# Patient Record
Sex: FEMALE | Race: WHITE | NOT HISPANIC OR LATINO | Employment: OTHER | URBAN - METROPOLITAN AREA
[De-identification: names, ages, dates, MRNs, and addresses within clinical notes are randomized per-mention and may not be internally consistent; named-entity substitution may affect disease eponyms.]

---

## 2017-01-23 RX ORDER — BUTALBITAL, ACETAMINOPHEN AND CAFFEINE 50; 325; 40 MG/1; MG/1; MG/1
TABLET ORAL
Qty: 100 TABLET | Refills: 3 | Status: SHIPPED | OUTPATIENT
Start: 2017-01-23 | End: 2017-06-22 | Stop reason: SDUPTHER

## 2017-02-01 ENCOUNTER — TELEPHONE (OUTPATIENT)
Dept: RHEUMATOLOGY | Facility: CLINIC | Age: 57
End: 2017-02-01

## 2017-02-02 NOTE — TELEPHONE ENCOUNTER
Patient states she has burning while urinating. Dr. Rodriguez will check urinalysis when patient comes February 6,2017

## 2017-02-05 RX ORDER — CIPROFLOXACIN 500 MG/1
500 TABLET ORAL 2 TIMES DAILY
Qty: 10 TABLET | Refills: 0 | Status: SHIPPED | OUTPATIENT
Start: 2017-02-05 | End: 2017-07-05 | Stop reason: ALTCHOICE

## 2017-02-06 ENCOUNTER — PATIENT MESSAGE (OUTPATIENT)
Dept: RHEUMATOLOGY | Facility: CLINIC | Age: 57
End: 2017-02-06

## 2017-02-06 ENCOUNTER — OFFICE VISIT (OUTPATIENT)
Dept: RHEUMATOLOGY | Facility: CLINIC | Age: 57
End: 2017-02-06
Payer: MEDICARE

## 2017-02-06 VITALS
WEIGHT: 96.31 LBS | HEART RATE: 83 BPM | DIASTOLIC BLOOD PRESSURE: 79 MMHG | SYSTOLIC BLOOD PRESSURE: 110 MMHG | HEIGHT: 59 IN | BODY MASS INDEX: 19.42 KG/M2

## 2017-02-06 DIAGNOSIS — F32.A DEPRESSION, UNSPECIFIED DEPRESSION TYPE: ICD-10-CM

## 2017-02-06 DIAGNOSIS — L40.50 PSORIATIC ARTHRITIS: ICD-10-CM

## 2017-02-06 DIAGNOSIS — R63.4 WEIGHT LOSS: ICD-10-CM

## 2017-02-06 DIAGNOSIS — L40.8 PSORIASIS WITH PUSTULES: Primary | ICD-10-CM

## 2017-02-06 DIAGNOSIS — R79.9 ABNORMAL FINDING OF BLOOD CHEMISTRY: ICD-10-CM

## 2017-02-06 DIAGNOSIS — E06.9 THYROIDITIS: ICD-10-CM

## 2017-02-06 PROCEDURE — 99215 OFFICE O/P EST HI 40 MIN: CPT | Mod: 25,S$PBB,, | Performed by: INTERNAL MEDICINE

## 2017-02-06 PROCEDURE — 99999 PR PBB SHADOW E&M-EST. PATIENT-LVL III: CPT | Mod: PBBFAC,,, | Performed by: INTERNAL MEDICINE

## 2017-02-06 PROCEDURE — 96372 THER/PROPH/DIAG INJ SC/IM: CPT | Mod: PBBFAC,PO

## 2017-02-06 PROCEDURE — 99213 OFFICE O/P EST LOW 20 MIN: CPT | Mod: PBBFAC,PO | Performed by: INTERNAL MEDICINE

## 2017-02-06 RX ORDER — MEGESTROL ACETATE 40 MG/1
40 TABLET ORAL 2 TIMES DAILY
Qty: 60 TABLET | Refills: 11 | Status: SHIPPED | OUTPATIENT
Start: 2017-02-06 | End: 2018-01-29 | Stop reason: SDUPTHER

## 2017-02-06 RX ORDER — PREDNISONE 5 MG/1
5 TABLET ORAL DAILY
Qty: 30 TABLET | Refills: 5 | Status: SHIPPED | OUTPATIENT
Start: 2017-02-06 | End: 2017-03-09 | Stop reason: SDUPTHER

## 2017-02-06 RX ORDER — QUETIAPINE FUMARATE 100 MG/1
100 TABLET, FILM COATED ORAL DAILY
COMMUNITY
End: 2017-07-05

## 2017-02-06 RX ORDER — ESCITALOPRAM OXALATE 20 MG/1
20 TABLET ORAL DAILY
Qty: 30 TABLET | Refills: 6 | Status: SHIPPED | OUTPATIENT
Start: 2017-02-06 | End: 2017-07-05

## 2017-02-06 RX ORDER — KETOROLAC TROMETHAMINE 30 MG/ML
60 INJECTION, SOLUTION INTRAMUSCULAR; INTRAVENOUS
Status: COMPLETED | OUTPATIENT
Start: 2017-02-06 | End: 2017-02-06

## 2017-02-06 RX ORDER — METHYLPREDNISOLONE ACETATE 80 MG/ML
160 INJECTION, SUSPENSION INTRA-ARTICULAR; INTRALESIONAL; INTRAMUSCULAR; SOFT TISSUE
Status: COMPLETED | OUTPATIENT
Start: 2017-02-06 | End: 2017-02-06

## 2017-02-06 RX ORDER — BUTALBITAL, ACETAMINOPHEN AND CAFFEINE 50; 325; 40 MG/1; MG/1; MG/1
TABLET ORAL
Qty: 100 TABLET | Refills: 3 | Status: SHIPPED | OUTPATIENT
Start: 2017-02-06 | End: 2017-10-02 | Stop reason: SDUPTHER

## 2017-02-06 RX ORDER — THYROID 30 MG/1
TABLET ORAL
Qty: 30 TABLET | Refills: 4 | Status: SHIPPED | OUTPATIENT
Start: 2017-02-06 | End: 2017-07-05

## 2017-02-06 RX ORDER — CYANOCOBALAMIN 1000 UG/ML
1000 INJECTION, SOLUTION INTRAMUSCULAR; SUBCUTANEOUS
Status: COMPLETED | OUTPATIENT
Start: 2017-02-06 | End: 2017-02-06

## 2017-02-06 RX ORDER — ESCITALOPRAM OXALATE 10 MG/1
10 TABLET ORAL DAILY
COMMUNITY
End: 2017-02-06 | Stop reason: SDUPTHER

## 2017-02-06 RX ORDER — BACLOFEN 20 MG/1
20 TABLET ORAL 3 TIMES DAILY
Qty: 90 TABLET | Refills: 11 | Status: SHIPPED | OUTPATIENT
Start: 2017-02-06 | End: 2018-01-29

## 2017-02-06 RX ORDER — CLONAZEPAM 1 MG/1
1 TABLET ORAL 2 TIMES DAILY PRN
COMMUNITY
End: 2017-07-05

## 2017-02-06 RX ADMIN — KETOROLAC TROMETHAMINE 60 MG: 60 INJECTION, SOLUTION INTRAMUSCULAR at 09:02

## 2017-02-06 RX ADMIN — CYANOCOBALAMIN 1000 MCG: 1000 INJECTION, SOLUTION INTRAMUSCULAR at 09:02

## 2017-02-06 RX ADMIN — METHYLPREDNISOLONE ACETATE 160 MG: 80 INJECTION, SUSPENSION INTRA-ARTICULAR; INTRALESIONAL; INTRAMUSCULAR; SOFT TISSUE at 09:02

## 2017-02-06 ASSESSMENT — ROUTINE ASSESSMENT OF PATIENT INDEX DATA (RAPID3)
TOTAL RAPID3 SCORE: 6.39
PATIENT GLOBAL ASSESSMENT SCORE: 9
PSYCHOLOGICAL DISTRESS SCORE: 9.9
PAIN SCORE: 7.5
MDHAQ FUNCTION SCORE: .8

## 2017-02-06 NOTE — PROGRESS NOTES
Administered 1 cc ( 1000 mcg/ml ) of b 12 to the right upper outer gluteal. Informed of s/s to report verbalized understanding. No adverse reactions noted.    Lot # [id number]  Expiration jul 18    Administered 2 cc ( 80 mg/ml ) of depomedrol to the right upper outer gluteal. Informed of s/s to report verbalized understanding. No adverse reactions noted.    Lot # [id number]  Expiration 10/2017    Administered 2 cc ( 30 mg/ml ) of toradol to the left upper outer gluteal. Informed of s/s to report verbalized understanding. No adverse reactions noted.    Lot # [id number]  Expiration 1 mar 2018

## 2017-02-06 NOTE — MR AVS SNAPSHOT
[facility]  [address] [facility] [address] [place] [address]  Phone: [phone]  Fax: [phone]                  Barbara Mims   2017 8:30 AM   Office Visit    Description:  Female : 1960   Provider:  Micheal Rodriguez MD   Department:  [facility]           Reason for Visit     Disease Management           Diagnoses this Visit        Comments    Psoriasis with pustules    -  Primary     Psoriatic arthritis         Thyroiditis         Weight loss         Depression, unspecified depression type         Abnormal finding of blood chemistry                To Do List           Future Appointments        Provider Department Dept Phone    2017 10:45 AM LAB, [place] [facility] [phone]    2017 10:00 AM Micheal Rodriguez MD [facility] [phone]      Goals (5 Years of Data)     None      Follow-Up and Disposition     Return in about 4 months (around 2017).       These Medications        Disp Refills Start End    predniSONE (DELTASONE) 5 MG tablet 30 tablet 5 2017 3/8/2017    Take 1 tablet (5 mg total) by mouth once daily. - Oral    Pharmacy: [facility] - [address] Ph #: [phone]       baclofen (LIORESAL) 20 MG tablet 90 tablet 11 2017    Take 1 tablet (20 mg total) by mouth 3 (three) times daily. - Oral    Pharmacy: [place] [id number] [facility] Ph #: [phone]       escitalopram oxalate (LEXAPRO) 20 MG tablet 30 tablet 6 2017     Take 1 tablet (20 mg total) by mouth once daily. - Oral    Pharmacy: [place] [id number] [facility] Ph #: [phone]       megestrol (MEGACE) 40 MG Tab 60 tablet 11 2017    Take 1 tablet (40 mg total) by mouth 2 (two) times daily. appetite - Oral    [facility] [place]  [facility] [id number] [facility] Ph #: [phone]       thyroid (ARMOUR THYROID) Tab 30 tablet 4 2/6/2017     Take 1 tablet (30 mg total) by mouth once daily. In am with water hold all other foods and meds for 30 min    [facility] [id number] [facility] Ph #: [phone]       butalbital-acetaminophen-caffeine -40 mg (FIORICET, ESGIC) -40 mg per tablet 100 tablet 3 2/6/2017     Take 1 tablet by mouth every 6 (six) hours as needed for Pain or Headaches.    Pharmacy: [facility] [id number] [facility] Ph #: [phone]       Notes to Pharmacy: This prescription was filled today. Any refills authorized will be placed on file.      [facility]     [facility] Care [facility] - 24/7 Assistance  Registered nurses in the [facility] provide clinical advisement, health education, appointment booking, and other advisory services.  Call for this free service at [phone].             Medications           Message regarding Medications     Verify the changes and/or additions to your medication regime listed below are the same as discussed with your clinician today.  If any of these changes or additions are incorrect, please notify your healthcare provider.        START taking these NEW medications        Refills    predniSONE (DELTASONE) 5 MG tablet 5    Sig: Take 1 tablet (5 mg total) by mouth once daily.    Class: Normal    Route: Oral    baclofen (LIORESAL) 20 MG tablet 11    Sig: Take 1 tablet (20 mg total) by mouth 3 (three) times daily.    Class: Normal    Route: Oral    escitalopram oxalate (LEXAPRO) 20 MG tablet 6    Sig: Take 1 tablet (20 mg total) by mouth once daily.    Class: Normal    Route: Oral    megestrol (MEGACE) 40 MG Tab 11    Sig: Take 1 tablet (40 mg total) by mouth 2 (two) times daily. appetite    Class: Normal    Route: Oral      These medications were administered today         Dose Freq    methylPREDNISolone acetate injection 160 mg 160 mg Clinic/HOD 1 time    Sig: Inject 2 mLs (160 mg total) into the muscle one time.    Class: Normal    Route: Intramuscular    ketorolac injection 60 mg 60 mg Clinic/HOD 1 time    Sig: Inject 2 mLs (60 mg total) into the muscle one time.    Class: Normal    Route: Intramuscular    cyanocobalamin injection 1,000 mcg 1,000 mcg Clinic/HOD 1 time    Sig: Inject 1 mL (1,000 mcg total) into the muscle one time.    Class: Normal    Route: Intramuscular      CHANGE how you are taking these medications     Start Taking Instead of    thyroid (ARMOUR THYROID) Tab ARMOUR THYROID 30 mg Tab    Dosage:  Take 1 tablet (30 mg total) by mouth once daily. In am with water hold all other foods and meds for 30 min Dosage:  Take 1 tablet (30 mg total) by mouth once daily. In am with water hold all other foods and meds for 30 min    Reason for Change:  Patient no longer taking       STOP taking these medications     paroxetine (PAXIL) 20 MG tablet Take 1 tablet (20 mg total) by mouth every morning.    amitriptyline (ELAVIL) 50 MG tablet Take 3 tablets (150 mg total) by mouth nightly.    oxycodone-acetaminophen (PERCOCET)  mg per tablet Take 1 tablet by mouth every 4 to 6 hours as needed.    gabapentin (NEURONTIN) 100 MG capsule     duloxetine (CYMBALTA) 60 MG capsule Take 1 capsule (60 mg total) by mouth once daily. At night    amitriptyline (ELAVIL) 50 MG tablet Take 3 tablets (150 mg total) by mouth nightly.    zolpidem (AMBIEN) 10 mg Tab TAKE 1 TABLET BY MOUTH NIGHTLY AS NEEDED    tizanidine 4 mg Cap Take 6 mg by mouth.    CRESTOR 20 mg tablet Take 40 mg by mouth once daily.     carvedilol (COREG) 12.5 MG tablet Take 12.5 mg by mouth 2 (two) times daily with meals.            Verify that the below list of medications is an accurate representation of the medications you are currently taking.  If none reported, the list may be blank. If incorrect, please contact your  "healthcare provider. Carry this list with you in case of emergency.           Current Medications     clonazePAM (KLONOPIN) 1 MG tablet Take 1 mg by mouth 2 (two) times daily as needed for Anxiety.    escitalopram oxalate (LEXAPRO) 20 MG tablet Take 1 tablet (20 mg total) by mouth once daily.    quetiapine (SEROQUEL) 100 MG Tab Take 100 mg by mouth once daily.    baclofen (LIORESAL) 20 MG tablet Take 1 tablet (20 mg total) by mouth 3 (three) times daily.    butalbital-acetaminophen-caffeine -40 mg (FIORICET, ESGIC) -40 mg per tablet TAKE 1 TABLET BY MOUTH EVERY 6 HOURS AS NEEDED FOR PAIN and HEADACHE    butalbital-acetaminophen-caffeine -40 mg (FIORICET, ESGIC) -40 mg per tablet Take 1 tablet by mouth every 6 (six) hours as needed for Pain or Headaches.    ciprofloxacin HCl (CIPRO) 500 MG tablet Take 1 tablet (500 mg total) by mouth 2 (two) times daily.    clobetasol 0.05% (TEMOVATE) 0.05 % Oint Apply topically 2 (two) times daily.    diazepam (VALIUM) 5 MG tablet     ferrous sulfate 325 (65 FE) MG EC tablet Take 325 mg by mouth once daily.    hydrocodone-acetaminophen 10-325mg (NORCO)  mg Tab Take 1 tablet by mouth 4 (four) times daily.     hydrOXYzine pamoate (VISTARIL) 25 MG Cap Take 1 capsule (25 mg total) by mouth every 8 (eight) hours as needed.    megestrol (MEGACE) 40 MG Tab Take 1 tablet (40 mg total) by mouth 2 (two) times daily. appetite    omeprazole (PRILOSEC) 40 MG capsule TAKE 1 CAPSULE BY MOUTH once daily    predniSONE (DELTASONE) 5 MG tablet Take 1 tablet (5 mg total) by mouth once daily.    thyroid (ARMOUR THYROID) Tab Take 1 tablet (30 mg total) by mouth once daily. In am with water hold all other foods and meds for 30 min           Clinical Reference Information           Your Vitals Were     BP Pulse Height Weight BMI    110/79 83 4' 11" (1.499 m) 43.7 kg (96 lb 5.5 oz) 19.46 kg/m2      Blood Pressure          Most Recent Value    BP  110/79      Allergies as of " 2/6/2017     Ancef [Cefazolin]    Demerol [Meperidine]      Immunizations Administered on Date of Encounter - 2/6/2017     None      Orders Placed During Today's Visit     Future Labs/Procedures Expected by Expires    C-reactive protein  2/6/2017 4/7/2018    CBC auto differential  2/6/2017 4/7/2018    Comprehensive metabolic panel  2/6/2017 4/7/2018    Hemoglobin A1c  2/6/2017 4/7/2018    Sedimentation rate, manual  2/6/2017 4/7/2018    T3, free  2/6/2017 4/7/2018    T4, free  2/6/2017 4/7/2018    TSH  2/6/2017 4/7/2018    Vitamin D  2/6/2017 4/7/2018      Administrations This Visit     cyanocobalamin injection 1,000 mcg     Admin Date Action Dose Route Administered By             02/06/2017 Given 1000 mcg Intramuscular Henna Sahu LPN                    ketorolac injection 60 mg     Admin Date Action Dose Route Administered By             02/06/2017 Given 60 mg Intramuscular Henna Sahu LPN                    methylPREDNISolone acetate injection 160 mg     Admin Date Action Dose Route Administered By             02/06/2017 Given 160 mg Intramuscular Henna Sahu LPN                      Instructions      Ask about the ambien   tell him i upped the lexapro added megace and add prednisone  Changed zanaflex to baclofen       Smoking Cessation     If you would like to quit smoking:   You may be eligible for free services if you are a [place] resident and started smoking cigarettes before September 1, 1988.  Call the Smoking Cessation [employer] ([facility]) toll free at [phone] or [phone].   Call 1-800-QUIT-NOW if you do not meet the above criteria.            Language Assistance Services     ATTENTION: Language assistance services are available, free of charge. Please call [phone].      ATENCIÓN: Si habla español, tiene a kaur disposición servicios gratuitos de asistencia lingüística. Llame al [phone].     CHÚ Ý: N?u b?n nói Ti?ng Vi?t, có các d?ch v? h? tr? ngôn ng? mi?n phí  dành cho b?n. G?i s? [phone].         [facility] complies with applicable Federal civil rights laws and does not discriminate on the basis of race, color, national origin, age, disability, or sex.

## 2017-02-06 NOTE — PATIENT INSTRUCTIONS
Ask about the ambien   tell him i upped the lexapro added megace and add prednisone  Changed zanaflex to baclofen

## 2017-02-06 NOTE — PROGRESS NOTES
Subjective:       Patient ID: Barbara Mims is a 56 y.o. female.    Chief Complaint: Disease Management   HPI Comments: :Psoriatic arthritis   Here for follow up the pt is presently nothing for treatment and is doing poorly with  increase rash or increase joint pain. She lost her son nov 2016The pt has not had any symptoms of fever, chills, shortness of breath, and change in activity. Noted is am gelling lasting longer than 30 min , there is still some gelling with inactivity. There is  some swelling in the knees but not warm to the touch and enthesis pain has been noted over the last 11 months. Overall disease state is worsening without improvement .   Very depressed and lost 20 lbs            She complains of joint swelling. Associated symptoms include myalgias.               She complains of joint swelling. Associated symptoms include myalgias.               She complains of joint swelling. Associated symptoms include myalgias.       Fatigue   Associated symptoms include joint swelling, myalgias, neck pain, a rash and weakness. Pertinent negatives include no abdominal pain, anorexia, arthralgias, change in bowel habit, chest pain, chills, congestion, coughing, diaphoresis, nausea, numbness, sore throat, swollen glands, urinary symptoms, vertigo, visual change or vomiting.     Review of Systems   Constitutional: Positive for activity change. Negative for appetite change, chills, diaphoresis and unexpected weight change.   HENT: Negative for congestion, dental problem, ear discharge, ear pain, facial swelling, mouth sores, nosebleeds, postnasal drip, rhinorrhea, sinus pressure, sneezing, sore throat, tinnitus and voice change.    Eyes: Negative for photophobia, pain, discharge, redness and itching.   Respiratory: Negative for apnea, cough, chest tightness, shortness of breath and wheezing.    Cardiovascular: Positive for leg swelling. Negative for chest pain and palpitations.   Gastrointestinal: Negative for  "abdominal distention, abdominal pain, anorexia, change in bowel habit, constipation, diarrhea, nausea and vomiting.   Endocrine: Negative for cold intolerance, heat intolerance, polydipsia and polyuria.   Genitourinary: Negative for decreased urine volume, difficulty urinating, flank pain, frequency, hematuria and urgency.   Musculoskeletal: Positive for back pain, gait problem, joint swelling, myalgias, neck pain and neck stiffness. Negative for arthralgias.   Skin: Positive for rash. Negative for pallor and wound.        Nailbed psoriatic   Allergic/Immunologic: Negative for immunocompromised state.   Neurological: Positive for weakness. Negative for dizziness, vertigo, tremors and numbness.   Hematological: Negative for adenopathy. Does not bruise/bleed easily.   Psychiatric/Behavioral: Negative for sleep disturbance. The patient is not nervous/anxious.          Objective:     Visit Vitals    /79    Pulse 83    Ht 4' 11" (1.499 m)    Wt 43.7 kg (96 lb 5.5 oz)    BMI 19.46 kg/m2        Physical Exam   Nursing note and vitals reviewed.  Constitutional: She is oriented to person, place, and time and well-developed, well-nourished, and in no distress.   HENT:   Head: Normocephalic and atraumatic.   Mouth/Throat: Oropharynx is clear and moist.   Eyes: EOM are normal. Pupils are equal, round, and reactive to light.   Neck: Neck supple. No thyromegaly present.   Cardiovascular: Normal rate, regular rhythm and normal heart sounds.  Exam reveals no gallop and no friction rub.    No murmur heard.  Pulmonary/Chest: Breath sounds normal. She has no wheezes. She has no rales. She exhibits no tenderness.   Abdominal: There is no tenderness. There is no rebound and no guarding.       Right Side Rheumatological Exam     Examination finds the elbow normal.    The patient is tender to palpation of the shoulder, wrist, knee, 1st PIP, 1st MCP, 2nd PIP, 2nd MCP, 3rd PIP, 3rd MCP, 4th PIP, 4th MCP, 5th PIP and 5th " MCP    She has swelling of the 1st PIP, 1st MCP, 2nd PIP, 2nd MCP, 3rd PIP, 3rd MCP, 4th PIP, 4th MCP, 5th PIP and 5th MCP    Shoulder Exam   Tenderness Location: no tenderness    Range of Motion   Active Abduction: abnormal   Adduction: abnormal  Sensation: normal    Knee Exam   Patellofemoral Crepitus: positive  Effusion: negative  Sensation: normal    Hip Exam   Tenderness Location: posterior  Sensation: normal    Elbow/Wrist Exam   Tenderness Location: no tenderness  Sensation: normal    Left Side Rheumatological Exam     Examination finds the elbow normal.    The patient is tender to palpation of the shoulder, wrist, knee, 1st PIP, 1st MCP, 2nd PIP, 2nd MCP, 3rd PIP, 3rd MCP, 4th PIP, 4th MCP, 5th PIP and 5th MCP.    She has swelling of the 1st PIP, 1st MCP, 2nd PIP, 2nd MCP, 3rd PIP, 3rd MCP, 4th PIP, 4th MCP, 5th PIP and 5th MCP    Shoulder Exam   Tenderness Location: no tenderness    Range of Motion   Active Abduction: abnormal   Sensation: normal    Knee Exam     Patellofemoral Crepitus: positive  Effusion: negative  Sensation: normal    Hip Exam   Tenderness Location: posterior  Sensation: normal    Elbow/Wrist Exam   Sensation: normal      Back/Neck Exam   Neck Range of Motion   Flexion: Severely limited  Extension: Severely limited    Comments:   Incision clear    Lymphadenopathy:     She has no cervical adenopathy.   Neurological: She is alert and oriented to person, place, and time. Gait normal.   Skin: No rash noted. No erythema. No pallor.     Psychiatric: Mood and affect normal.   Musculoskeletal: She exhibits tenderness and deformity. She exhibits no edema.           Results for orders placed or performed in visit on 02/29/16   TSH   Result Value Ref Range    TSH 4.529 (H) 0.400 - 4.000 uIU/mL   T4, free   Result Value Ref Range    Free T4 0.69 (L) 0.71 - 1.51 ng/dL   T3, free   Result Value Ref Range    T3, Free 2.4 2.3 - 4.2 pg/mL   Comprehensive metabolic panel   Result Value Ref Range    Sodium  141 136 - 145 mmol/L    Potassium 4.3 3.5 - 5.1 mmol/L    Chloride 104 95 - 110 mmol/L    CO2 27 23 - 29 mmol/L    Glucose 98 70 - 110 mg/dL    BUN, Bld 13 6 - 20 mg/dL    Creatinine 0.8 0.5 - 1.4 mg/dL    Calcium 9.8 8.7 - 10.5 mg/dL    Total Protein 7.6 6.0 - 8.4 g/dL    Albumin 4.2 3.5 - 5.2 g/dL    Total Bilirubin 0.2 0.1 - 1.0 mg/dL    Alkaline Phosphatase 108 55 - 135 U/L    AST 21 10 - 40 U/L    ALT 22 10 - 44 U/L    Anion Gap 10 8 - 16 mmol/L    eGFR if African American >60.0 >60 mL/min/1.73 m^2    eGFR if non African American >60.0 >60 mL/min/1.73 m^2   CBC auto differential   Result Value Ref Range    WBC 13.70 (H) 3.90 - 12.70 K/uL    RBC 4.29 4.00 - 5.40 M/uL    Hemoglobin 14.0 12.0 - 16.0 g/dL    Hematocrit 41.7 37.0 - 48.5 %    MCV 97 82 - 98 fL    MCH 32.6 (H) 27.0 - 31.0 pg    MCHC 33.6 32.0 - 36.0 %    RDW 12.8 11.5 - 14.5 %    Platelets 264 150 - 350 K/uL    MPV 10.5 9.2 - 12.9 fL    Gran # 8.4 (H) 1.8 - 7.7 K/uL    Lymph # 3.8 1.0 - 4.8 K/uL    Mono # 0.9 0.3 - 1.0 K/uL    Eos # 0.5 0.0 - 0.5 K/uL    Baso # 0.11 0.00 - 0.20 K/uL    Gran% 61.2 38.0 - 73.0 %    Lymph% 27.7 18.0 - 48.0 %    Mono% 6.7 4.0 - 15.0 %    Eosinophil% 3.4 0.0 - 8.0 %    Basophil% 0.8 0.0 - 1.9 %    Differential Method Automated    Anti-thyroglobulin antibody   Result Value Ref Range    Thyroglobulin Ab Screen <4.0 0.0 - 3.9 IU/mL   Thyroid peroxidase antibody   Result Value Ref Range    Thyroperoxidase Antibodies 37.0 (H) <6.0 IU/mL   Sjogrens syndrome-A extractable nuclear antibody   Result Value Ref Range    Anti-SSA Antibody 0.89 0.00 - 19.99 EU    Anti-SSA Interpretation Negative Negative   Sjogrens syndrome-B extractable nuclear antibody   Result Value Ref Range    Anti-SSB Antibody 0.26 0.00 - 19.99 EU    Anti-SSB Interpretation Negative Negative       Assessment:       Encounter Diagnoses   Name Primary?    Psoriasis with pustules Yes    Psoriatic arthritis     Thyroiditis     Weight loss     Depression,  unspecified depression type     Abnormal finding of blood chemistry           Plan:     Psoriasis with pustules  -     predniSONE (DELTASONE) 5 MG tablet; Take 1 tablet (5 mg total) by mouth once daily.  Dispense: 30 tablet; Refill: 5  -     baclofen (LIORESAL) 20 MG tablet; Take 1 tablet (20 mg total) by mouth 3 (three) times daily.  Dispense: 90 tablet; Refill: 11  -     escitalopram oxalate (LEXAPRO) 20 MG tablet; Take 1 tablet (20 mg total) by mouth once daily.  Dispense: 30 tablet; Refill: 6  -     megestrol (MEGACE) 40 MG Tab; Take 1 tablet (40 mg total) by mouth 2 (two) times daily. appetite  Dispense: 60 tablet; Refill: 11  -     methylPREDNISolone acetate injection 160 mg; Inject 2 mLs (160 mg total) into the muscle one time.  -     ketorolac injection 60 mg; Inject 2 mLs (60 mg total) into the muscle one time.  -     cyanocobalamin injection 1,000 mcg; Inject 1 mL (1,000 mcg total) into the muscle one time.  -     CBC auto differential; Future; Expected date: 2/6/17  -     Comprehensive metabolic panel; Future; Expected date: 2/6/17  -     C-reactive protein; Future; Expected date: 2/6/17  -     Sedimentation rate, manual; Future; Expected date: 2/6/17  -     TSH; Future; Expected date: 2/6/17  -     T4, free; Future; Expected date: 2/6/17  -     T3, free; Future; Expected date: 2/6/17  -     Vitamin D; Future; Expected date: 2/6/17  -     thyroid (ARMOUR THYROID) Tab; Take 1 tablet (30 mg total) by mouth once daily. In am with water hold all other foods and meds for 30 min  Dispense: 30 tablet; Refill: 4  -     Hemoglobin A1c; Future; Expected date: 2/6/17    Psoriatic arthritis  -     predniSONE (DELTASONE) 5 MG tablet; Take 1 tablet (5 mg total) by mouth once daily.  Dispense: 30 tablet; Refill: 5  -     baclofen (LIORESAL) 20 MG tablet; Take 1 tablet (20 mg total) by mouth 3 (three) times daily.  Dispense: 90 tablet; Refill: 11  -     escitalopram oxalate (LEXAPRO) 20 MG tablet; Take 1 tablet (20 mg  total) by mouth once daily.  Dispense: 30 tablet; Refill: 6  -     megestrol (MEGACE) 40 MG Tab; Take 1 tablet (40 mg total) by mouth 2 (two) times daily. appetite  Dispense: 60 tablet; Refill: 11  -     methylPREDNISolone acetate injection 160 mg; Inject 2 mLs (160 mg total) into the muscle one time.  -     ketorolac injection 60 mg; Inject 2 mLs (60 mg total) into the muscle one time.  -     cyanocobalamin injection 1,000 mcg; Inject 1 mL (1,000 mcg total) into the muscle one time.  -     CBC auto differential; Future; Expected date: 2/6/17  -     Comprehensive metabolic panel; Future; Expected date: 2/6/17  -     C-reactive protein; Future; Expected date: 2/6/17  -     Sedimentation rate, manual; Future; Expected date: 2/6/17  -     TSH; Future; Expected date: 2/6/17  -     T4, free; Future; Expected date: 2/6/17  -     T3, free; Future; Expected date: 2/6/17  -     Vitamin D; Future; Expected date: 2/6/17  -     thyroid (ARMOUR THYROID) Tab; Take 1 tablet (30 mg total) by mouth once daily. In am with water hold all other foods and meds for 30 min  Dispense: 30 tablet; Refill: 4  -     Hemoglobin A1c; Future; Expected date: 2/6/17    Thyroiditis  -     predniSONE (DELTASONE) 5 MG tablet; Take 1 tablet (5 mg total) by mouth once daily.  Dispense: 30 tablet; Refill: 5  -     baclofen (LIORESAL) 20 MG tablet; Take 1 tablet (20 mg total) by mouth 3 (three) times daily.  Dispense: 90 tablet; Refill: 11  -     escitalopram oxalate (LEXAPRO) 20 MG tablet; Take 1 tablet (20 mg total) by mouth once daily.  Dispense: 30 tablet; Refill: 6  -     megestrol (MEGACE) 40 MG Tab; Take 1 tablet (40 mg total) by mouth 2 (two) times daily. appetite  Dispense: 60 tablet; Refill: 11  -     methylPREDNISolone acetate injection 160 mg; Inject 2 mLs (160 mg total) into the muscle one time.  -     ketorolac injection 60 mg; Inject 2 mLs (60 mg total) into the muscle one time.  -     cyanocobalamin injection 1,000 mcg; Inject 1 mL (1,000  mcg total) into the muscle one time.  -     CBC auto differential; Future; Expected date: 2/6/17  -     Comprehensive metabolic panel; Future; Expected date: 2/6/17  -     C-reactive protein; Future; Expected date: 2/6/17  -     Sedimentation rate, manual; Future; Expected date: 2/6/17  -     TSH; Future; Expected date: 2/6/17  -     T4, free; Future; Expected date: 2/6/17  -     T3, free; Future; Expected date: 2/6/17  -     Vitamin D; Future; Expected date: 2/6/17  -     thyroid (ARMOUR THYROID) Tab; Take 1 tablet (30 mg total) by mouth once daily. In am with water hold all other foods and meds for 30 min  Dispense: 30 tablet; Refill: 4  -     Hemoglobin A1c; Future; Expected date: 2/6/17    Weight loss  -     predniSONE (DELTASONE) 5 MG tablet; Take 1 tablet (5 mg total) by mouth once daily.  Dispense: 30 tablet; Refill: 5  -     baclofen (LIORESAL) 20 MG tablet; Take 1 tablet (20 mg total) by mouth 3 (three) times daily.  Dispense: 90 tablet; Refill: 11  -     escitalopram oxalate (LEXAPRO) 20 MG tablet; Take 1 tablet (20 mg total) by mouth once daily.  Dispense: 30 tablet; Refill: 6  -     megestrol (MEGACE) 40 MG Tab; Take 1 tablet (40 mg total) by mouth 2 (two) times daily. appetite  Dispense: 60 tablet; Refill: 11  -     methylPREDNISolone acetate injection 160 mg; Inject 2 mLs (160 mg total) into the muscle one time.  -     ketorolac injection 60 mg; Inject 2 mLs (60 mg total) into the muscle one time.  -     cyanocobalamin injection 1,000 mcg; Inject 1 mL (1,000 mcg total) into the muscle one time.  -     CBC auto differential; Future; Expected date: 2/6/17  -     Comprehensive metabolic panel; Future; Expected date: 2/6/17  -     C-reactive protein; Future; Expected date: 2/6/17  -     Sedimentation rate, manual; Future; Expected date: 2/6/17  -     TSH; Future; Expected date: 2/6/17  -     T4, free; Future; Expected date: 2/6/17  -     T3, free; Future; Expected date: 2/6/17  -     Vitamin D; Future;  Expected date: 2/6/17  -     thyroid (ARMOUR THYROID) Tab; Take 1 tablet (30 mg total) by mouth once daily. In am with water hold all other foods and meds for 30 min  Dispense: 30 tablet; Refill: 4  -     Hemoglobin A1c; Future; Expected date: 2/6/17    Depression, unspecified depression type  -     predniSONE (DELTASONE) 5 MG tablet; Take 1 tablet (5 mg total) by mouth once daily.  Dispense: 30 tablet; Refill: 5  -     baclofen (LIORESAL) 20 MG tablet; Take 1 tablet (20 mg total) by mouth 3 (three) times daily.  Dispense: 90 tablet; Refill: 11  -     escitalopram oxalate (LEXAPRO) 20 MG tablet; Take 1 tablet (20 mg total) by mouth once daily.  Dispense: 30 tablet; Refill: 6  -     megestrol (MEGACE) 40 MG Tab; Take 1 tablet (40 mg total) by mouth 2 (two) times daily. appetite  Dispense: 60 tablet; Refill: 11  -     methylPREDNISolone acetate injection 160 mg; Inject 2 mLs (160 mg total) into the muscle one time.  -     ketorolac injection 60 mg; Inject 2 mLs (60 mg total) into the muscle one time.  -     cyanocobalamin injection 1,000 mcg; Inject 1 mL (1,000 mcg total) into the muscle one time.  -     CBC auto differential; Future; Expected date: 2/6/17  -     Comprehensive metabolic panel; Future; Expected date: 2/6/17  -     C-reactive protein; Future; Expected date: 2/6/17  -     Sedimentation rate, manual; Future; Expected date: 2/6/17  -     TSH; Future; Expected date: 2/6/17  -     T4, free; Future; Expected date: 2/6/17  -     T3, free; Future; Expected date: 2/6/17  -     Vitamin D; Future; Expected date: 2/6/17  -     thyroid (ARMOUR THYROID) Tab; Take 1 tablet (30 mg total) by mouth once daily. In am with water hold all other foods and meds for 30 min  Dispense: 30 tablet; Refill: 4  -     Hemoglobin A1c; Future; Expected date: 2/6/17    Abnormal finding of blood chemistry   -     Hemoglobin A1c; Future; Expected date: 2/6/17    Other orders  -     butalbital-acetaminophen-caffeine -40 mg (FIORICET,  ESGIC) -40 mg per tablet; Take 1 tablet by mouth every 6 (six) hours as needed for Pain or Headaches.  Dispense: 100 tablet; Refill: 3  Ask about the ambien   tell him i upped the lexapro added megace and add prednisone  Changed zanaflex to baclofen

## 2017-02-16 ENCOUNTER — LAB VISIT (OUTPATIENT)
Dept: LAB | Facility: HOSPITAL | Age: 57
End: 2017-02-16
Attending: INTERNAL MEDICINE
Payer: MEDICARE

## 2017-02-16 DIAGNOSIS — L40.8 PSORIASIS WITH PUSTULES: ICD-10-CM

## 2017-02-16 DIAGNOSIS — E06.9 THYROIDITIS: ICD-10-CM

## 2017-02-16 DIAGNOSIS — F32.A DEPRESSION, UNSPECIFIED DEPRESSION TYPE: ICD-10-CM

## 2017-02-16 DIAGNOSIS — L40.50 PSORIATIC ARTHRITIS: ICD-10-CM

## 2017-02-16 DIAGNOSIS — R63.4 WEIGHT LOSS: ICD-10-CM

## 2017-02-16 DIAGNOSIS — R79.9 ABNORMAL FINDING OF BLOOD CHEMISTRY: ICD-10-CM

## 2017-02-16 LAB
25(OH)D3+25(OH)D2 SERPL-MCNC: 11 NG/ML
ALBUMIN SERPL BCP-MCNC: 4.3 G/DL
ALP SERPL-CCNC: 87 U/L
ALT SERPL W/O P-5'-P-CCNC: 10 U/L
ANION GAP SERPL CALC-SCNC: 7 MMOL/L
AST SERPL-CCNC: 13 U/L
BASOPHILS # BLD AUTO: 0.07 K/UL
BASOPHILS NFR BLD: 0.9 %
BILIRUB SERPL-MCNC: 0.3 MG/DL
BUN SERPL-MCNC: 23 MG/DL
CALCIUM SERPL-MCNC: 9.7 MG/DL
CHLORIDE SERPL-SCNC: 110 MMOL/L
CO2 SERPL-SCNC: 21 MMOL/L
CREAT SERPL-MCNC: 0.8 MG/DL
CRP SERPL-MCNC: 0.5 MG/L
DIFFERENTIAL METHOD: ABNORMAL
EOSINOPHIL # BLD AUTO: 0.1 K/UL
EOSINOPHIL NFR BLD: 0.9 %
ERYTHROCYTE [DISTWIDTH] IN BLOOD BY AUTOMATED COUNT: 12.7 %
ERYTHROCYTE [SEDIMENTATION RATE] IN BLOOD BY WESTERGREN METHOD: 9 MM/HR
EST. GFR  (AFRICAN AMERICAN): >60 ML/MIN/1.73 M^2
EST. GFR  (NON AFRICAN AMERICAN): >60 ML/MIN/1.73 M^2
GLUCOSE SERPL-MCNC: 97 MG/DL
HCT VFR BLD AUTO: 41.8 %
HGB BLD-MCNC: 14.4 G/DL
LYMPHOCYTES # BLD AUTO: 2.1 K/UL
LYMPHOCYTES NFR BLD: 27.1 %
MCH RBC QN AUTO: 32.9 PG
MCHC RBC AUTO-ENTMCNC: 34.4 %
MCV RBC AUTO: 95 FL
MONOCYTES # BLD AUTO: 0.3 K/UL
MONOCYTES NFR BLD: 3.8 %
NEUTROPHILS # BLD AUTO: 5.3 K/UL
NEUTROPHILS NFR BLD: 67 %
PLATELET # BLD AUTO: 302 K/UL
PMV BLD AUTO: 9.9 FL
POTASSIUM SERPL-SCNC: 5 MMOL/L
PROT SERPL-MCNC: 7.6 G/DL
RBC # BLD AUTO: 4.38 M/UL
SODIUM SERPL-SCNC: 138 MMOL/L
T3FREE SERPL-MCNC: 2.4 PG/ML
T4 FREE SERPL-MCNC: 0.81 NG/DL
TSH SERPL DL<=0.005 MIU/L-ACNC: 1.67 UIU/ML
WBC # BLD AUTO: 7.83 K/UL

## 2017-02-16 PROCEDURE — 83036 HEMOGLOBIN GLYCOSYLATED A1C: CPT

## 2017-02-16 PROCEDURE — 84481 FREE ASSAY (FT-3): CPT

## 2017-02-16 PROCEDURE — 80053 COMPREHEN METABOLIC PANEL: CPT

## 2017-02-16 PROCEDURE — 85025 COMPLETE CBC W/AUTO DIFF WBC: CPT

## 2017-02-16 PROCEDURE — 84443 ASSAY THYROID STIM HORMONE: CPT

## 2017-02-16 PROCEDURE — 84439 ASSAY OF FREE THYROXINE: CPT

## 2017-02-16 PROCEDURE — 86140 C-REACTIVE PROTEIN: CPT

## 2017-02-16 PROCEDURE — 85651 RBC SED RATE NONAUTOMATED: CPT | Mod: PO

## 2017-02-16 PROCEDURE — 82306 VITAMIN D 25 HYDROXY: CPT

## 2017-02-16 PROCEDURE — 36415 COLL VENOUS BLD VENIPUNCTURE: CPT | Mod: PO

## 2017-02-17 LAB
ESTIMATED AVG GLUCOSE: 117 MG/DL
HBA1C MFR BLD HPLC: 5.7 %

## 2017-02-17 RX ORDER — ERGOCALCIFEROL 1.25 MG/1
50000 CAPSULE ORAL
Qty: 4 CAPSULE | Refills: 6 | Status: SHIPPED | OUTPATIENT
Start: 2017-02-17 | End: 2017-07-05

## 2017-02-20 NOTE — TELEPHONE ENCOUNTER
----- Message from Kaya Hess sent at 2/20/2017  9:55 AM CST -----  Patient is calling for lab test results.Please call patient back at [phone] to advise.  Thanks!

## 2017-02-21 RX ORDER — ERGOCALCIFEROL 1.25 MG/1
50000 CAPSULE ORAL
Qty: 4 CAPSULE | Refills: 6 | Status: SHIPPED | OUTPATIENT
Start: 2017-02-21 | End: 2017-07-05 | Stop reason: SDUPTHER

## 2017-03-09 ENCOUNTER — TELEPHONE (OUTPATIENT)
Dept: RHEUMATOLOGY | Facility: CLINIC | Age: 57
End: 2017-03-09

## 2017-03-09 DIAGNOSIS — L40.50 PSORIATIC ARTHRITIS: ICD-10-CM

## 2017-03-09 DIAGNOSIS — L40.8 PSORIASIS WITH PUSTULES: ICD-10-CM

## 2017-03-09 DIAGNOSIS — E06.9 THYROIDITIS: ICD-10-CM

## 2017-03-09 DIAGNOSIS — F32.A DEPRESSION, UNSPECIFIED DEPRESSION TYPE: ICD-10-CM

## 2017-03-09 DIAGNOSIS — R63.4 WEIGHT LOSS: ICD-10-CM

## 2017-03-09 RX ORDER — PREDNISONE 5 MG/1
5 TABLET ORAL DAILY
Qty: 30 TABLET | Refills: 5 | Status: SHIPPED | OUTPATIENT
Start: 2017-03-09 | End: 2017-03-15 | Stop reason: SDUPTHER

## 2017-03-09 NOTE — TELEPHONE ENCOUNTER
----- Message from Tamela Pacheco sent at 3/7/2017  2:49 PM CST -----  Contact: self  Patient called stating that the Prednisone 5 mg is not helping   She was previous high dosage and would like to take that again   Please call    Thanks

## 2017-03-09 NOTE — TELEPHONE ENCOUNTER
----- Message from Snow Mcfadden sent at 3/9/2017 10:59 AM CST -----  Contact: pt  Returning call, Mirna  Call placed to pod, no answer  Call back on # [phone]  thanks

## 2017-03-15 DIAGNOSIS — R63.4 WEIGHT LOSS: ICD-10-CM

## 2017-03-15 DIAGNOSIS — E06.9 THYROIDITIS: ICD-10-CM

## 2017-03-15 DIAGNOSIS — L40.50 PSORIATIC ARTHRITIS: ICD-10-CM

## 2017-03-15 DIAGNOSIS — F32.A DEPRESSION, UNSPECIFIED DEPRESSION TYPE: ICD-10-CM

## 2017-03-15 DIAGNOSIS — L40.8 PSORIASIS WITH PUSTULES: ICD-10-CM

## 2017-03-16 RX ORDER — PREDNISONE 5 MG/1
5 TABLET ORAL DAILY
Qty: 30 TABLET | Refills: 5 | Status: SHIPPED | OUTPATIENT
Start: 2017-03-16 | End: 2017-04-15

## 2017-04-19 DIAGNOSIS — E03.9 HYPOTHYROIDISM, UNSPECIFIED TYPE: ICD-10-CM

## 2017-04-19 DIAGNOSIS — G47.00 INSOMNIA, UNSPECIFIED TYPE: ICD-10-CM

## 2017-04-19 DIAGNOSIS — L40.50 PSORIATIC ARTHRITIS: ICD-10-CM

## 2017-04-19 DIAGNOSIS — L40.8 PSORIASIS WITH PUSTULES: ICD-10-CM

## 2017-04-20 RX ORDER — ZOLPIDEM TARTRATE 10 MG/1
TABLET ORAL
Qty: 30 TABLET | Refills: 3 | Status: SHIPPED | OUTPATIENT
Start: 2017-04-20 | End: 2017-08-23 | Stop reason: SDUPTHER

## 2017-05-02 ENCOUNTER — TELEPHONE (OUTPATIENT)
Dept: RHEUMATOLOGY | Facility: CLINIC | Age: 57
End: 2017-05-02

## 2017-06-22 NOTE — TELEPHONE ENCOUNTER
----- Message from Emily Rodriguez sent at 6/22/2017  1:13 PM CDT -----  Contact: Patient  Patient called advising that her pharmacy, [facility] in [place], sent a prescription request for refill of butalbital-acetaminophen-caffeine -40 mg (FIORICET, ESGIC) -40 mg per tablet.  However, they have not heard a response.  Can you please call patient back at [phone] to advise of the status?  Thank you!

## 2017-06-23 RX ORDER — BUTALBITAL, ACETAMINOPHEN AND CAFFEINE 50; 325; 40 MG/1; MG/1; MG/1
TABLET ORAL
Qty: 100 TABLET | Refills: 3 | Status: SHIPPED | OUTPATIENT
Start: 2017-06-23 | End: 2017-07-05 | Stop reason: SDUPTHER

## 2017-07-05 ENCOUNTER — OFFICE VISIT (OUTPATIENT)
Dept: RHEUMATOLOGY | Facility: CLINIC | Age: 57
End: 2017-07-05
Payer: MEDICARE

## 2017-07-05 VITALS
SYSTOLIC BLOOD PRESSURE: 129 MMHG | TEMPERATURE: 98 F | HEART RATE: 75 BPM | BODY MASS INDEX: 20.75 KG/M2 | DIASTOLIC BLOOD PRESSURE: 86 MMHG | WEIGHT: 102.75 LBS

## 2017-07-05 DIAGNOSIS — I73.00 RAYNAUD'S DISEASE WITHOUT GANGRENE: ICD-10-CM

## 2017-07-05 DIAGNOSIS — L40.50 PSORIATIC ARTHRITIS: Primary | ICD-10-CM

## 2017-07-05 DIAGNOSIS — F41.9 ANXIETY DISORDER, UNSPECIFIED TYPE: ICD-10-CM

## 2017-07-05 DIAGNOSIS — J32.9 SINUSITIS, UNSPECIFIED CHRONICITY, UNSPECIFIED LOCATION: ICD-10-CM

## 2017-07-05 DIAGNOSIS — M54.2 CHRONIC NECK PAIN: ICD-10-CM

## 2017-07-05 DIAGNOSIS — G89.29 CHRONIC NECK PAIN: ICD-10-CM

## 2017-07-05 PROCEDURE — 99999 PR PBB SHADOW E&M-EST. PATIENT-LVL III: CPT | Mod: PBBFAC,,, | Performed by: INTERNAL MEDICINE

## 2017-07-05 PROCEDURE — 99215 OFFICE O/P EST HI 40 MIN: CPT | Mod: 25,S$PBB,, | Performed by: INTERNAL MEDICINE

## 2017-07-05 PROCEDURE — 99213 OFFICE O/P EST LOW 20 MIN: CPT | Mod: PBBFAC,PO | Performed by: INTERNAL MEDICINE

## 2017-07-05 RX ORDER — BUDESONIDE AND FORMOTEROL FUMARATE DIHYDRATE 80; 4.5 UG/1; UG/1
2 AEROSOL RESPIRATORY (INHALATION) 2 TIMES DAILY
Refills: 3 | COMMUNITY
Start: 2017-07-01 | End: 2018-01-29

## 2017-07-05 RX ORDER — AMITRIPTYLINE HYDROCHLORIDE 10 MG/1
10 TABLET, FILM COATED ORAL NIGHTLY PRN
Qty: 30 TABLET | Refills: 6 | Status: SHIPPED | OUTPATIENT
Start: 2017-07-05 | End: 2017-07-05 | Stop reason: SDUPTHER

## 2017-07-05 RX ORDER — KETOROLAC TROMETHAMINE 30 MG/ML
60 INJECTION, SOLUTION INTRAMUSCULAR; INTRAVENOUS
Status: COMPLETED | OUTPATIENT
Start: 2017-07-05 | End: 2017-07-05

## 2017-07-05 RX ORDER — PAROXETINE HYDROCHLORIDE HEMIHYDRATE 12.5 MG/1
12.5 TABLET, FILM COATED, EXTENDED RELEASE ORAL EVERY MORNING
Qty: 30 TABLET | Refills: 11 | Status: SHIPPED | OUTPATIENT
Start: 2017-07-05 | End: 2018-01-29

## 2017-07-05 RX ORDER — AZITHROMYCIN 250 MG/1
TABLET, FILM COATED ORAL
Qty: 6 TABLET | Refills: 1 | Status: SHIPPED | OUTPATIENT
Start: 2017-07-05 | End: 2018-01-29 | Stop reason: ALTCHOICE

## 2017-07-05 RX ORDER — ERGOCALCIFEROL 1.25 MG/1
50000 CAPSULE ORAL
Qty: 4 CAPSULE | Refills: 6 | Status: SHIPPED | OUTPATIENT
Start: 2017-07-05 | End: 2018-01-29 | Stop reason: SDUPTHER

## 2017-07-05 RX ORDER — CLONAZEPAM 0.5 MG/1
0.5 TABLET ORAL 2 TIMES DAILY PRN
Qty: 60 TABLET | Refills: 0 | Status: SHIPPED | OUTPATIENT
Start: 2017-07-05 | End: 2018-01-29

## 2017-07-05 RX ORDER — ALBUTEROL SULFATE 90 UG/1
2 AEROSOL, METERED RESPIRATORY (INHALATION) EVERY 4 HOURS PRN
Refills: 1 | COMMUNITY
Start: 2017-07-01 | End: 2018-01-29

## 2017-07-05 RX ORDER — CHLORZOXAZONE 750 MG/1
750 TABLET ORAL 3 TIMES DAILY PRN
Qty: 90 TABLET | Refills: 3 | Status: SHIPPED | OUTPATIENT
Start: 2017-07-05 | End: 2017-08-04

## 2017-07-05 RX ORDER — METHYLPREDNISOLONE ACETATE 80 MG/ML
160 INJECTION, SUSPENSION INTRA-ARTICULAR; INTRALESIONAL; INTRAMUSCULAR; SOFT TISSUE
Status: COMPLETED | OUTPATIENT
Start: 2017-07-05 | End: 2017-07-05

## 2017-07-05 RX ORDER — ROSUVASTATIN CALCIUM 20 MG/1
20 TABLET, COATED ORAL
COMMUNITY
Start: 2017-03-20 | End: 2018-01-29

## 2017-07-05 RX ORDER — AMITRIPTYLINE HYDROCHLORIDE 10 MG/1
10 TABLET, FILM COATED ORAL NIGHTLY PRN
Qty: 30 TABLET | Refills: 6 | Status: SHIPPED | OUTPATIENT
Start: 2017-07-05 | End: 2018-01-29

## 2017-07-05 RX ADMIN — METHYLPREDNISOLONE ACETATE 160 MG: 80 INJECTION, SUSPENSION INTRA-ARTICULAR; INTRALESIONAL; INTRAMUSCULAR; SOFT TISSUE at 11:07

## 2017-07-05 RX ADMIN — KETOROLAC TROMETHAMINE 60 MG: 60 INJECTION, SOLUTION INTRAMUSCULAR at 11:07

## 2017-07-05 ASSESSMENT — ROUTINE ASSESSMENT OF PATIENT INDEX DATA (RAPID3)
PATIENT GLOBAL ASSESSMENT SCORE: 5
MDHAQ FUNCTION SCORE: 1.5
TOTAL RAPID3 SCORE: 6.66
AM STIFFNESS SCORE: 1, YES
PSYCHOLOGICAL DISTRESS SCORE: 4.4
WHEN YOU AWAKENED IN THE MORNING OVER THE LAST WEEK, PLEASE INDICATE THE AMOUNT OF TIME IT TAKES UNTIL YOU ARE AS LIMBER AS YOU WILL BE FOR THE DAY: ONE HOUR AFTER WAKING
PAIN SCORE: 10
FATIGUE SCORE: 5

## 2017-07-05 NOTE — PROGRESS NOTES
Subjective:       Patient ID: Barbara Mims is a 57 y.o. female.    Chief Complaint: Follow-up   :Psoriatic arthritis   Here for follow up the pt she has had joint pain and all over now has sinusitis and uti but also feels like she is having a fibromyalgia. The pt has not had any symptoms of fever, chills, shortness of breath, and change in activity. Noted is am gelling lasting longer than 30 min , there is still some gelling with inactivity. There is  some swelling in the knees but not warm to the touch and enthesis pain has been noted over the last 11 months. Overall disease state is worsening without improvement .   Very depressed and lost 20 lbs              She complains of joint swelling. Associated symptoms include fatigue and myalgias.               She complains of joint swelling. Associated symptoms include fatigue and myalgias.               She complains of joint swelling. Associated symptoms include fatigue and myalgias.       Fatigue   Associated symptoms include fatigue, joint swelling, myalgias, neck pain, a rash and weakness. Pertinent negatives include no abdominal pain, anorexia, arthralgias, change in bowel habit, chest pain, chills, congestion, coughing, diaphoresis, nausea, numbness, sore throat, swollen glands, urinary symptoms, vertigo, visual change or vomiting.     Review of Systems   Constitutional: Positive for activity change and fatigue. Negative for appetite change, chills, diaphoresis and unexpected weight change.   HENT: Negative for congestion, dental problem, ear discharge, ear pain, facial swelling, mouth sores, nosebleeds, postnasal drip, rhinorrhea, sinus pressure, sneezing, sore throat, tinnitus and voice change.    Eyes: Negative for photophobia, pain, discharge, redness and itching.   Respiratory: Negative for apnea, cough, chest tightness, shortness of breath and wheezing.    Cardiovascular: Positive for leg swelling. Negative for chest pain and palpitations.    Gastrointestinal: Negative for abdominal distention, abdominal pain, anorexia, change in bowel habit, constipation, diarrhea, nausea and vomiting.   Endocrine: Negative for cold intolerance, heat intolerance, polydipsia and polyuria.   Genitourinary: Negative for decreased urine volume, difficulty urinating, flank pain, frequency, hematuria and urgency.   Musculoskeletal: Positive for back pain, gait problem, joint swelling, myalgias, neck pain and neck stiffness. Negative for arthralgias.   Skin: Positive for rash. Negative for pallor and wound.        Nailbed psoriatic   Allergic/Immunologic: Negative for immunocompromised state.   Neurological: Positive for weakness. Negative for dizziness, vertigo, tremors and numbness.   Hematological: Negative for adenopathy. Does not bruise/bleed easily.   Psychiatric/Behavioral: Negative for sleep disturbance. The patient is not nervous/anxious.          Objective:     /86 (BP Location: Left arm, Patient Position: Sitting, BP Method: Automatic)   Pulse 75   Temp 98.3 °F (36.8 °C)   Wt 46.6 kg (102 lb 11.8 oz)   BMI 20.75 kg/m²      Physical Exam   Nursing note and vitals reviewed.  Constitutional: She is oriented to person, place, and time and well-developed, well-nourished, and in no distress.   HENT:   Head: Normocephalic and atraumatic.   Mouth/Throat: Oropharynx is clear and moist.   Eyes: EOM are normal. Pupils are equal, round, and reactive to light.   Neck: Neck supple. No thyromegaly present.   Cardiovascular: Normal rate, regular rhythm and normal heart sounds.  Exam reveals no gallop and no friction rub.    No murmur heard.  Pulmonary/Chest: Breath sounds normal. She has no wheezes. She has no rales. She exhibits no tenderness.   Abdominal: There is no tenderness. There is no rebound and no guarding.       Right Side Rheumatological Exam     Examination finds the elbow normal.    The patient is tender to palpation of the shoulder, wrist, knee, 1st PIP,  1st MCP, 2nd PIP, 2nd MCP, 3rd PIP, 3rd MCP, 4th PIP, 4th MCP, 5th PIP and 5th MCP    She has swelling of the 1st PIP, 1st MCP, 2nd PIP, 2nd MCP, 3rd PIP, 3rd MCP, 4th PIP, 4th MCP, 5th PIP and 5th MCP    Shoulder Exam   Tenderness Location: no tenderness    Range of Motion   Active Abduction: abnormal   Adduction: abnormal  Sensation: normal    Knee Exam   Patellofemoral Crepitus: positive  Effusion: negative  Sensation: normal    Hip Exam   Tenderness Location: posterior  Sensation: normal    Elbow/Wrist Exam   Tenderness Location: no tenderness  Sensation: normal    Left Side Rheumatological Exam     Examination finds the elbow normal.    The patient is tender to palpation of the shoulder, wrist, knee, 1st PIP, 1st MCP, 2nd PIP, 2nd MCP, 3rd PIP, 3rd MCP, 4th PIP, 4th MCP, 5th PIP and 5th MCP.    She has swelling of the 1st PIP, 1st MCP, 2nd PIP, 2nd MCP, 3rd PIP, 3rd MCP, 4th PIP, 4th MCP, 5th PIP and 5th MCP    Shoulder Exam   Tenderness Location: no tenderness    Range of Motion   Active Abduction: abnormal   Sensation: normal    Knee Exam     Patellofemoral Crepitus: positive  Effusion: negative  Sensation: normal    Hip Exam   Tenderness Location: posterior  Sensation: normal    Elbow/Wrist Exam   Sensation: normal      Back/Neck Exam   Neck Range of Motion   Flexion: Severely limited  Extension: Severely limited    Comments:   Incision clear    Lymphadenopathy:     She has no cervical adenopathy.   Neurological: She is alert and oriented to person, place, and time. Gait normal.   Skin: No rash noted. No erythema. No pallor.     Psychiatric: Mood and affect normal.   Musculoskeletal: She exhibits tenderness and deformity. She exhibits no edema.           Results for orders placed or performed in visit on 02/16/17   CBC auto differential   Result Value Ref Range    WBC 7.83 3.90 - 12.70 K/uL    RBC 4.38 4.00 - 5.40 M/uL    Hemoglobin 14.4 12.0 - 16.0 g/dL    Hematocrit 41.8 37.0 - 48.5 %    MCV 95 82 - 98 fL     MCH 32.9 (H) 27.0 - 31.0 pg    MCHC 34.4 32.0 - 36.0 %    RDW 12.7 11.5 - 14.5 %    Platelets 302 150 - 350 K/uL    MPV 9.9 9.2 - 12.9 fL    Gran # 5.3 1.8 - 7.7 K/uL    Lymph # 2.1 1.0 - 4.8 K/uL    Mono # 0.3 0.3 - 1.0 K/uL    Eos # 0.1 0.0 - 0.5 K/uL    Baso # 0.07 0.00 - 0.20 K/uL    Gran% 67.0 38.0 - 73.0 %    Lymph% 27.1 18.0 - 48.0 %    Mono% 3.8 (L) 4.0 - 15.0 %    Eosinophil% 0.9 0.0 - 8.0 %    Basophil% 0.9 0.0 - 1.9 %    Differential Method Automated    Comprehensive metabolic panel   Result Value Ref Range    Sodium 138 136 - 145 mmol/L    Potassium 5.0 3.5 - 5.1 mmol/L    Chloride 110 95 - 110 mmol/L    CO2 21 (L) 23 - 29 mmol/L    Glucose 97 70 - 110 mg/dL    BUN, Bld 23 (H) 6 - 20 mg/dL    Creatinine 0.8 0.5 - 1.4 mg/dL    Calcium 9.7 8.7 - 10.5 mg/dL    Total Protein 7.6 6.0 - 8.4 g/dL    Albumin 4.3 3.5 - 5.2 g/dL    Total Bilirubin 0.3 0.1 - 1.0 mg/dL    Alkaline Phosphatase 87 55 - 135 U/L    AST 13 10 - 40 U/L    ALT 10 10 - 44 U/L    Anion Gap 7 (L) 8 - 16 mmol/L    eGFR if African American >60.0 >60 mL/min/1.73 m^2    eGFR if non African American >60.0 >60 mL/min/1.73 m^2   C-reactive protein   Result Value Ref Range    CRP 0.5 0.0 - 8.2 mg/L   Sedimentation rate, manual   Result Value Ref Range    Sed Rate 9 0 - 20 mm/Hr   TSH   Result Value Ref Range    TSH 1.668 0.400 - 4.000 uIU/mL   T4, free   Result Value Ref Range    Free T4 0.81 0.71 - 1.51 ng/dL   T3, free   Result Value Ref Range    T3, Free 2.4 2.3 - 4.2 pg/mL   Vitamin D   Result Value Ref Range    Vit D, 25-Hydroxy 11 (L) 30 - 96 ng/mL   Hemoglobin A1c   Result Value Ref Range    Hemoglobin A1C 5.7 4.5 - 6.2 %    Estimated Avg Glucose 117 68 - 131 mg/dL       Assessment:       No diagnosis found.      Plan:     Psoriatic arthritis  -     Sedimentation rate, manual; Future; Expected date: 07/05/2017  -     C-reactive protein; Future; Expected date: 07/05/2017  -     chlorzoxazone 750 mg Tab; Take 750 mg by mouth 3 (three) times  daily as needed.  Dispense: 90 tablet; Refill: 3  -     TSH; Future; Expected date: 07/05/2017  -     T4, free; Future; Expected date: 07/05/2017  -     T3, free; Future; Expected date: 07/05/2017    Anxiety disorder, unspecified type  -     Sedimentation rate, manual; Future; Expected date: 07/05/2017  -     C-reactive protein; Future; Expected date: 07/05/2017  -     chlorzoxazone 750 mg Tab; Take 750 mg by mouth 3 (three) times daily as needed.  Dispense: 90 tablet; Refill: 3  -     TSH; Future; Expected date: 07/05/2017  -     T4, free; Future; Expected date: 07/05/2017  -     T3, free; Future; Expected date: 07/05/2017    Raynaud's disease without gangrene  -     Sedimentation rate, manual; Future; Expected date: 07/05/2017  -     C-reactive protein; Future; Expected date: 07/05/2017  -     chlorzoxazone 750 mg Tab; Take 750 mg by mouth 3 (three) times daily as needed.  Dispense: 90 tablet; Refill: 3  -     TSH; Future; Expected date: 07/05/2017  -     T4, free; Future; Expected date: 07/05/2017  -     T3, free; Future; Expected date: 07/05/2017    Sinusitis, unspecified chronicity, unspecified location  -     TSH; Future; Expected date: 07/05/2017  -     T4, free; Future; Expected date: 07/05/2017  -     T3, free; Future; Expected date: 07/05/2017    Chronic neck pain  -     X-Ray Cervical Spine AP And Lateral; Future; Expected date: 07/05/2017    Other orders  -     paroxetine (PAXIL-CR) 12.5 MG 24 hr tablet; Take 1 tablet (12.5 mg total) by mouth every morning. anxiety  Dispense: 30 tablet; Refill: 11  -     Discontinue: amitriptyline (ELAVIL) 10 MG tablet; Take 1 tablet (10 mg total) by mouth nightly as needed for Insomnia.  Dispense: 30 tablet; Refill: 6  -     ergocalciferol (ERGOCALCIFEROL) 50,000 unit Cap; Take 1 capsule (50,000 Units total) by mouth every 7 days.  Dispense: 4 capsule; Refill: 6  -     azithromycin (Z-MEHNAZ) 250 MG tablet; Take 2 tablets by mouth on day 1; Take 1 tablet by mouth on days  2-5  Dispense: 6 tablet; Refill: 1  -     methylPREDNISolone acetate injection 160 mg; Inject 2 mLs (160 mg total) into the muscle one time.  -     ketorolac injection 60 mg; Inject 2 mLs (60 mg total) into the muscle one time.  -     amitriptyline (ELAVIL) 10 MG tablet; Take 1 tablet (10 mg total) by mouth nightly as needed for Insomnia.  Dispense: 30 tablet; Refill: 6  -     clonazePAM (KLONOPIN) 0.5 MG tablet; Take 1 tablet (0.5 mg total) by mouth 2 (two) times daily as needed for Anxiety.  Dispense: 60 tablet; Refill: 0    Ask about the ambien   tell him i upped the lexapro added megace and add prednisone  Changed zanaflex to baclofen

## 2017-07-05 NOTE — PROGRESS NOTES
Administered 2 cc ( 30 mg/ml ) of toradol to the left upper outer gluteal. Informed of s/s to report verbalized understanding. No adverse reactions noted.    Lot # [id number]  Expiration 1 jan 2019    Administered 2 cc ( 80 mg/ml ) of depomedrol to the right upper outer gluteal. Informed of s/s to report verbalized understanding. No adverse reactions noted.    Lot # [id number]  Expiration 02/2018

## 2017-08-23 DIAGNOSIS — L40.8 PSORIASIS WITH PUSTULES: ICD-10-CM

## 2017-08-23 DIAGNOSIS — E03.9 HYPOTHYROIDISM, UNSPECIFIED TYPE: ICD-10-CM

## 2017-08-23 DIAGNOSIS — G47.00 INSOMNIA, UNSPECIFIED TYPE: ICD-10-CM

## 2017-08-23 DIAGNOSIS — L40.50 PSORIATIC ARTHRITIS: ICD-10-CM

## 2017-08-23 NOTE — TELEPHONE ENCOUNTER
----- Message from Jaxon STRINGER Robbie sent at 8/23/2017 10:05 AM CDT -----  Contact: same  Patient called in and wanted to check the status of her refill request for her Ambien 10 mg.      [facility] [place] [place] [facility] [address]  [address] [address]  Phone: [phone] Fax: [phone]    Patient call back number is [phone]

## 2017-08-23 NOTE — TELEPHONE ENCOUNTER
Spoke to pt, advised this is the first request received and her refill request is being sent to Dr. Rodriguez. Pt advised a request was sent last Thursday. Advised nurse did search and this is the first request received. Advise Dr. Rodriguez is in clinic and will send as soon as she is able.      checked, last filled 7/21/17.

## 2017-08-25 RX ORDER — ZOLPIDEM TARTRATE 10 MG/1
TABLET ORAL
Qty: 30 TABLET | Refills: 3 | Status: SHIPPED | OUTPATIENT
Start: 2017-08-25 | End: 2018-06-12

## 2017-10-02 NOTE — TELEPHONE ENCOUNTER
----- Message from Dipika Salcido sent at 10/2/2017  3:39 PM CDT -----  Contact: self  Pharmacy faxed refill request on Thursday and Friday for butalbital-acetaminophen-caffeine -40 mg (FIORICET, ESGIC) -40 mg per tablet Please call back at [phone] (home)     [facility] [place] [facility] [address]  [address] [address]  Phone: [phone] Fax: [phone]

## 2017-10-03 RX ORDER — BUTALBITAL, ACETAMINOPHEN AND CAFFEINE 50; 325; 40 MG/1; MG/1; MG/1
TABLET ORAL
Qty: 100 TABLET | Refills: 3 | Status: SHIPPED | OUTPATIENT
Start: 2017-10-03 | End: 2018-01-11 | Stop reason: SDUPTHER

## 2018-01-11 RX ORDER — ALPRAZOLAM 1 MG/1
1 TABLET ORAL 2 TIMES DAILY
Refills: 1 | COMMUNITY
Start: 2017-11-18 | End: 2018-01-29

## 2018-01-11 RX ORDER — TIZANIDINE 4 MG/1
TABLET ORAL
Refills: 1 | COMMUNITY
Start: 2017-11-28 | End: 2018-01-29

## 2018-01-11 NOTE — TELEPHONE ENCOUNTER
Spoke to pt, she reports having fever and flu. Advised we would need to reschedule appt. Pt didn't want to reschedule unless Dr. Rodriguez could send in her fioricet. Advised Dr. Rodriguez was in with patients and would send request to her. Advised pt did not need to come in with fever and flu and expose Dr. Rodriguez or anyone else. Rescheduled pt for 1/29/18.    checked, last filled 12/11/17.

## 2018-01-11 NOTE — TELEPHONE ENCOUNTER
----- Message from Whitney Majano sent at 1/11/2018  8:48 AM CST -----  Contact: pt  Pt calling states sick with temperature have an appointment for tomorrow 1/12 don't want to come in but needs to refill butalbital-acetaminophen-caffeine -40 mg (FIORICET, ESGIC) -40 mg per tablet and if she can please do it pt will reschedule her appointment for tomorrow so someone else can come in. Would like a notify as soon as possible if this can be done..[phone] (home)           .  [facility] [place]  [address]  [address] [facility] [address]  Phone: [phone] Fax: [phone]

## 2018-01-12 ENCOUNTER — TELEPHONE (OUTPATIENT)
Dept: RHEUMATOLOGY | Facility: CLINIC | Age: 58
End: 2018-01-12

## 2018-01-12 NOTE — TELEPHONE ENCOUNTER
Spoke to pt, advised request is pending with Dr. Rodriguez. She would like sent in today. Advised nurse would check with Dr. Rodriguez. No further questions.

## 2018-01-12 NOTE — TELEPHONE ENCOUNTER
----- Message from Jia Salinas sent at 1/12/2018  9:07 AM CST -----  Contact: Self  Patient states medication butalbital-acetaminophen-caffeine -40 mg (FIORICET, ESGIC) -40 mg per tablet is not at the pharmacy     Patient states nurse was gonna fax this yesterday, Patient needs a call back to let her know this is done       [facility] [place] - [place]  [address]  [address]  [place] [address]  Phone: [phone] Fax: [phone]

## 2018-01-14 RX ORDER — BUTALBITAL, ACETAMINOPHEN AND CAFFEINE 50; 325; 40 MG/1; MG/1; MG/1
TABLET ORAL
Qty: 100 TABLET | Refills: 3 | Status: SHIPPED | OUTPATIENT
Start: 2018-01-14 | End: 2018-04-17 | Stop reason: SDUPTHER

## 2018-01-29 ENCOUNTER — OFFICE VISIT (OUTPATIENT)
Dept: RHEUMATOLOGY | Facility: CLINIC | Age: 58
End: 2018-01-29
Payer: MEDICARE

## 2018-01-29 VITALS
WEIGHT: 94.56 LBS | BODY MASS INDEX: 19.1 KG/M2 | HEART RATE: 83 BPM | SYSTOLIC BLOOD PRESSURE: 115 MMHG | DIASTOLIC BLOOD PRESSURE: 78 MMHG

## 2018-01-29 DIAGNOSIS — R79.9 ABNORMAL FINDING OF BLOOD CHEMISTRY: ICD-10-CM

## 2018-01-29 DIAGNOSIS — F41.9 ANXIETY DISORDER, UNSPECIFIED TYPE: ICD-10-CM

## 2018-01-29 DIAGNOSIS — L40.50 PSORIATIC ARTHRITIS: Primary | ICD-10-CM

## 2018-01-29 DIAGNOSIS — E06.9 THYROIDITIS: ICD-10-CM

## 2018-01-29 DIAGNOSIS — M79.7 FIBROMYALGIA: ICD-10-CM

## 2018-01-29 DIAGNOSIS — L40.8 PSORIASIS WITH PUSTULES: ICD-10-CM

## 2018-01-29 DIAGNOSIS — R63.4 WEIGHT LOSS: ICD-10-CM

## 2018-01-29 DIAGNOSIS — F32.A DEPRESSION, UNSPECIFIED DEPRESSION TYPE: ICD-10-CM

## 2018-01-29 PROCEDURE — 99214 OFFICE O/P EST MOD 30 MIN: CPT | Mod: PBBFAC,PO | Performed by: INTERNAL MEDICINE

## 2018-01-29 PROCEDURE — 99214 OFFICE O/P EST MOD 30 MIN: CPT | Mod: S$PBB,,, | Performed by: INTERNAL MEDICINE

## 2018-01-29 PROCEDURE — 99999 PR PBB SHADOW E&M-EST. PATIENT-LVL IV: CPT | Mod: PBBFAC,,, | Performed by: INTERNAL MEDICINE

## 2018-01-29 RX ORDER — MEGESTROL ACETATE 40 MG/1
40 TABLET ORAL 2 TIMES DAILY
Qty: 60 TABLET | Refills: 11 | Status: SHIPPED | OUTPATIENT
Start: 2018-01-29 | End: 2018-12-21 | Stop reason: SDUPTHER

## 2018-01-29 RX ORDER — GABAPENTIN 300 MG/1
300 CAPSULE ORAL NIGHTLY
Refills: 1 | COMMUNITY
Start: 2018-01-13 | End: 2018-06-12 | Stop reason: SDUPTHER

## 2018-01-29 RX ORDER — HYDROCODONE BITARTRATE AND ACETAMINOPHEN 10; 325 MG/1; MG/1
1 TABLET ORAL EVERY 8 HOURS PRN
Qty: 90 TABLET | Refills: 0 | Status: SHIPPED | OUTPATIENT
Start: 2018-03-01 | End: 2018-04-17 | Stop reason: SDUPTHER

## 2018-01-29 RX ORDER — PREDNISONE 5 MG/1
10 TABLET ORAL DAILY
Qty: 60 TABLET | Refills: 4 | Status: SHIPPED | OUTPATIENT
Start: 2018-01-29 | End: 2018-02-28

## 2018-01-29 RX ORDER — QUETIAPINE FUMARATE 100 MG/1
100 TABLET, FILM COATED ORAL NIGHTLY
Qty: 30 TABLET | Refills: 11 | Status: SHIPPED | OUTPATIENT
Start: 2018-01-29 | End: 2018-06-12 | Stop reason: SDUPTHER

## 2018-01-29 RX ORDER — AZITHROMYCIN 250 MG/1
TABLET, FILM COATED ORAL
Qty: 6 TABLET | Refills: 0 | Status: SHIPPED | OUTPATIENT
Start: 2018-01-29 | End: 2018-06-12 | Stop reason: ALTCHOICE

## 2018-01-29 RX ORDER — OSELTAMIVIR PHOSPHATE 75 MG/1
75 CAPSULE ORAL 2 TIMES DAILY
Qty: 10 CAPSULE | Refills: 0 | Status: SHIPPED | OUTPATIENT
Start: 2018-01-29 | End: 2018-02-03

## 2018-01-29 RX ORDER — OMEPRAZOLE 40 MG/1
40 CAPSULE, DELAYED RELEASE ORAL DAILY
Qty: 30 CAPSULE | Refills: 5 | Status: SHIPPED | OUTPATIENT
Start: 2018-01-29 | End: 2019-03-11 | Stop reason: SDUPTHER

## 2018-01-29 RX ORDER — ERGOCALCIFEROL 1.25 MG/1
50000 CAPSULE ORAL
Qty: 4 CAPSULE | Refills: 6 | Status: SHIPPED | OUTPATIENT
Start: 2018-01-29 | End: 2018-12-21 | Stop reason: SDUPTHER

## 2018-01-29 RX ORDER — CYCLOBENZAPRINE HCL 5 MG
5 TABLET ORAL 2 TIMES DAILY PRN
Refills: 1 | COMMUNITY
Start: 2018-01-25 | End: 2018-06-12 | Stop reason: SDUPTHER

## 2018-01-29 ASSESSMENT — ROUTINE ASSESSMENT OF PATIENT INDEX DATA (RAPID3)
PATIENT GLOBAL ASSESSMENT SCORE: 3
PAIN SCORE: 9
AM STIFFNESS SCORE: 1, YES
FATIGUE SCORE: 5
PSYCHOLOGICAL DISTRESS SCORE: 3.3
MDHAQ FUNCTION SCORE: 1.5
TOTAL RAPID3 SCORE: 5.66
WHEN YOU AWAKENED IN THE MORNING OVER THE LAST WEEK, PLEASE INDICATE THE AMOUNT OF TIME IT TAKES UNTIL YOU ARE AS LIMBER AS YOU WILL BE FOR THE DAY: ONE HOUR AFTER WAKING

## 2018-01-29 NOTE — PATIENT INSTRUCTIONS
trinrellix 1 tab in am for anxiety/ depression  seroquel for sleep  zithromax pac for infection   tamiflu for flu  Prednisone 10 mg x 5 days then 5 mg in am only when flaring

## 2018-01-29 NOTE — PROGRESS NOTES
Subjective:       Patient ID: Barbara Mims is a 57 y.o. female.    Chief Complaint: Follow-up   :Psoriatic arthritis    Doing poorly in pain, she is having a fibromyalgia flare. The pt has not had any symptoms of fever, chills, shortness of breath, and change in activity. Noted is am gelling lasting longer than 60 min , there is still some gelling with inactivity. There is  some swelling in the knees but not warm to the touch and enthesis pain. Overall disease state is worsening without improvement .   Depressed staying in bed.  Lost 6 lbs and had rash after injections      Review of Systems   Constitutional: Positive for activity change. Negative for appetite change and unexpected weight change.   HENT: Negative for dental problem, ear discharge, ear pain, facial swelling, mouth sores, nosebleeds, postnasal drip, rhinorrhea, sinus pressure, sneezing, tinnitus and voice change.    Eyes: Negative for photophobia, pain, discharge, redness and itching.   Respiratory: Negative for apnea, chest tightness, shortness of breath and wheezing.    Cardiovascular: Positive for leg swelling. Negative for palpitations.   Gastrointestinal: Negative for abdominal distention, constipation and diarrhea.   Endocrine: Negative for cold intolerance, heat intolerance, polydipsia and polyuria.   Genitourinary: Negative for decreased urine volume, difficulty urinating, flank pain, frequency, hematuria and urgency.   Musculoskeletal: Positive for back pain, gait problem and neck stiffness.   Skin: Negative for pallor and wound.        Nailbed psoriatic   Allergic/Immunologic: Negative for immunocompromised state.   Neurological: Negative for dizziness and tremors.   Hematological: Negative for adenopathy. Does not bruise/bleed easily.   Psychiatric/Behavioral: Negative for sleep disturbance. The patient is not nervous/anxious.          Objective:     /78 (BP Location: Right arm, Patient Position: Sitting, BP Method: Medium  (Automatic))   Pulse 83   Wt 42.9 kg (94 lb 9.2 oz)   BMI 19.10 kg/m²      Physical Exam   Nursing note and vitals reviewed.  Constitutional: She is oriented to person, place, and time and well-developed, well-nourished, and in no distress.   HENT:   Head: Normocephalic and atraumatic.   Mouth/Throat: Oropharynx is clear and moist.   Eyes: EOM are normal. Pupils are equal, round, and reactive to light.   Neck: Neck supple. No thyromegaly present.   Cardiovascular: Normal rate, regular rhythm and normal heart sounds.  Exam reveals no gallop and no friction rub.    No murmur heard.  Pulmonary/Chest: Breath sounds normal. She has no wheezes. She has no rales. She exhibits no tenderness.   Abdominal: There is no tenderness. There is no rebound and no guarding.       Right Side Rheumatological Exam     Examination finds the elbow normal.    The patient is tender to palpation of the shoulder, wrist, knee, 1st PIP, 1st MCP, 2nd PIP, 2nd MCP, 3rd PIP, 3rd MCP, 4th PIP, 4th MCP, 5th PIP and 5th MCP    She has swelling of the 1st PIP, 1st MCP, 2nd PIP, 2nd MCP, 3rd PIP, 3rd MCP, 4th PIP, 4th MCP, 5th PIP and 5th MCP    Shoulder Exam   Tenderness Location: no tenderness    Range of Motion   Active Abduction: abnormal   Adduction: abnormal  Sensation: normal    Knee Exam   Patellofemoral Crepitus: positive  Effusion: negative  Sensation: normal    Hip Exam   Tenderness Location: posterior  Sensation: normal    Elbow/Wrist Exam   Tenderness Location: no tenderness  Sensation: normal    Left Side Rheumatological Exam     Examination finds the elbow normal.    The patient is tender to palpation of the shoulder, wrist, knee, 1st PIP, 1st MCP, 2nd PIP, 2nd MCP, 3rd PIP, 3rd MCP, 4th PIP, 4th MCP, 5th PIP and 5th MCP.    She has swelling of the 1st PIP, 1st MCP, 2nd PIP, 2nd MCP, 3rd PIP, 3rd MCP, 4th PIP, 4th MCP, 5th PIP and 5th MCP    Shoulder Exam   Tenderness Location: no tenderness    Range of Motion   Active Abduction:  abnormal   Sensation: normal    Knee Exam     Patellofemoral Crepitus: positive  Effusion: negative  Sensation: normal    Hip Exam   Tenderness Location: posterior  Sensation: normal    Elbow/Wrist Exam   Sensation: normal      Back/Neck Exam   Neck Range of Motion   Flexion: Severely limited  Extension: Severely limited    Comments:   Incision clear    Lymphadenopathy:     She has no cervical adenopathy.   Neurological: She is alert and oriented to person, place, and time. Gait normal.   Skin: No rash noted. No erythema. No pallor.     Psychiatric: Mood and affect normal.   Musculoskeletal: She exhibits tenderness and deformity. She exhibits no edema.           Results for orders placed or performed in visit on 02/16/17   CBC auto differential   Result Value Ref Range    WBC 7.83 3.90 - 12.70 K/uL    RBC 4.38 4.00 - 5.40 M/uL    Hemoglobin 14.4 12.0 - 16.0 g/dL    Hematocrit 41.8 37.0 - 48.5 %    MCV 95 82 - 98 fL    MCH 32.9 (H) 27.0 - 31.0 pg    MCHC 34.4 32.0 - 36.0 %    RDW 12.7 11.5 - 14.5 %    Platelets 302 150 - 350 K/uL    MPV 9.9 9.2 - 12.9 fL    Gran # (ANC) 5.3 1.8 - 7.7 K/uL    Lymph # 2.1 1.0 - 4.8 K/uL    Mono # 0.3 0.3 - 1.0 K/uL    Eos # 0.1 0.0 - 0.5 K/uL    Baso # 0.07 0.00 - 0.20 K/uL    Gran% 67.0 38.0 - 73.0 %    Lymph% 27.1 18.0 - 48.0 %    Mono% 3.8 (L) 4.0 - 15.0 %    Eosinophil% 0.9 0.0 - 8.0 %    Basophil% 0.9 0.0 - 1.9 %    Differential Method Automated    Comprehensive metabolic panel   Result Value Ref Range    Sodium 138 136 - 145 mmol/L    Potassium 5.0 3.5 - 5.1 mmol/L    Chloride 110 95 - 110 mmol/L    CO2 21 (L) 23 - 29 mmol/L    Glucose 97 70 - 110 mg/dL    BUN, Bld 23 (H) 6 - 20 mg/dL    Creatinine 0.8 0.5 - 1.4 mg/dL    Calcium 9.7 8.7 - 10.5 mg/dL    Total Protein 7.6 6.0 - 8.4 g/dL    Albumin 4.3 3.5 - 5.2 g/dL    Total Bilirubin 0.3 0.1 - 1.0 mg/dL    Alkaline Phosphatase 87 55 - 135 U/L    AST 13 10 - 40 U/L    ALT 10 10 - 44 U/L    Anion Gap 7 (L) 8 - 16 mmol/L    eGFR if  African American >60.0 >60 mL/min/1.73 m^2    eGFR if non African American >60.0 >60 mL/min/1.73 m^2   C-reactive protein   Result Value Ref Range    CRP 0.5 0.0 - 8.2 mg/L   Sedimentation rate, manual   Result Value Ref Range    Sed Rate 9 0 - 20 mm/Hr   TSH   Result Value Ref Range    TSH 1.668 0.400 - 4.000 uIU/mL   T4, free   Result Value Ref Range    Free T4 0.81 0.71 - 1.51 ng/dL   T3, free   Result Value Ref Range    T3, Free 2.4 2.3 - 4.2 pg/mL   Vitamin D   Result Value Ref Range    Vit D, 25-Hydroxy 11 (L) 30 - 96 ng/mL   Hemoglobin A1c   Result Value Ref Range    Hemoglobin A1C 5.7 4.5 - 6.2 %    Estimated Avg Glucose 117 68 - 131 mg/dL       Assessment:       Encounter Diagnoses   Name Primary?    Psoriatic arthritis Yes    Anxiety disorder, unspecified type     Depression, unspecified depression type     Fibromyalgia     Abnormal finding of blood chemistry           Plan:     Psoriatic arthritis  -     ergocalciferol (ERGOCALCIFEROL) 50,000 unit Cap; Take 1 capsule (50,000 Units total) by mouth every 7 days.  Dispense: 4 capsule; Refill: 6  -     omeprazole (PRILOSEC) 40 MG capsule; Take 1 capsule (40 mg total) by mouth once daily.  Dispense: 30 capsule; Refill: 5  -     QUEtiapine (SEROQUEL) 100 MG Tab; Take 1 tablet (100 mg total) by mouth every evening.  Dispense: 30 tablet; Refill: 11  -     FERNANDA; Future; Expected date: 01/29/2018  -     CBC auto differential; Future; Expected date: 01/29/2018  -     Comprehensive metabolic panel; Future; Expected date: 01/29/2018  -     C-reactive protein; Future; Expected date: 01/29/2018  -     Sedimentation rate, manual; Future; Expected date: 01/29/2018  -     TSH; Future; Expected date: 01/29/2018  -     Hemoglobin A1c; Future; Expected date: 01/29/2018  -     Iron and TIBC; Future; Expected date: 01/29/2018  -     megestrol (MEGACE) 40 MG Tab; Take 1 tablet (40 mg total) by mouth 2 (two) times daily. appetite  Dispense: 60 tablet; Refill: 11    Anxiety  disorder, unspecified type  -     ergocalciferol (ERGOCALCIFEROL) 50,000 unit Cap; Take 1 capsule (50,000 Units total) by mouth every 7 days.  Dispense: 4 capsule; Refill: 6  -     omeprazole (PRILOSEC) 40 MG capsule; Take 1 capsule (40 mg total) by mouth once daily.  Dispense: 30 capsule; Refill: 5  -     QUEtiapine (SEROQUEL) 100 MG Tab; Take 1 tablet (100 mg total) by mouth every evening.  Dispense: 30 tablet; Refill: 11  -     FERNANDA; Future; Expected date: 01/29/2018  -     CBC auto differential; Future; Expected date: 01/29/2018  -     Comprehensive metabolic panel; Future; Expected date: 01/29/2018  -     C-reactive protein; Future; Expected date: 01/29/2018  -     Sedimentation rate, manual; Future; Expected date: 01/29/2018  -     TSH; Future; Expected date: 01/29/2018  -     Hemoglobin A1c; Future; Expected date: 01/29/2018    Depression, unspecified depression type  -     ergocalciferol (ERGOCALCIFEROL) 50,000 unit Cap; Take 1 capsule (50,000 Units total) by mouth every 7 days.  Dispense: 4 capsule; Refill: 6  -     omeprazole (PRILOSEC) 40 MG capsule; Take 1 capsule (40 mg total) by mouth once daily.  Dispense: 30 capsule; Refill: 5  -     QUEtiapine (SEROQUEL) 100 MG Tab; Take 1 tablet (100 mg total) by mouth every evening.  Dispense: 30 tablet; Refill: 11  -     FERNANDA; Future; Expected date: 01/29/2018  -     CBC auto differential; Future; Expected date: 01/29/2018  -     Comprehensive metabolic panel; Future; Expected date: 01/29/2018  -     C-reactive protein; Future; Expected date: 01/29/2018  -     Sedimentation rate, manual; Future; Expected date: 01/29/2018  -     TSH; Future; Expected date: 01/29/2018  -     Hemoglobin A1c; Future; Expected date: 01/29/2018  -     megestrol (MEGACE) 40 MG Tab; Take 1 tablet (40 mg total) by mouth 2 (two) times daily. appetite  Dispense: 60 tablet; Refill: 11    Fibromyalgia  -     ergocalciferol (ERGOCALCIFEROL) 50,000 unit Cap; Take 1 capsule (50,000 Units total) by  mouth every 7 days.  Dispense: 4 capsule; Refill: 6  -     omeprazole (PRILOSEC) 40 MG capsule; Take 1 capsule (40 mg total) by mouth once daily.  Dispense: 30 capsule; Refill: 5  -     QUEtiapine (SEROQUEL) 100 MG Tab; Take 1 tablet (100 mg total) by mouth every evening.  Dispense: 30 tablet; Refill: 11  -     FERNANDA; Future; Expected date: 01/29/2018  -     CBC auto differential; Future; Expected date: 01/29/2018  -     Comprehensive metabolic panel; Future; Expected date: 01/29/2018  -     C-reactive protein; Future; Expected date: 01/29/2018  -     Sedimentation rate, manual; Future; Expected date: 01/29/2018  -     TSH; Future; Expected date: 01/29/2018  -     Hemoglobin A1c; Future; Expected date: 01/29/2018    Abnormal finding of blood chemistry   -     Hemoglobin A1c; Future; Expected date: 01/29/2018  -     Iron and TIBC; Future; Expected date: 01/29/2018  -     megestrol (MEGACE) 40 MG Tab; Take 1 tablet (40 mg total) by mouth 2 (two) times daily. appetite  Dispense: 60 tablet; Refill: 11    Psoriasis with pustules  -     megestrol (MEGACE) 40 MG Tab; Take 1 tablet (40 mg total) by mouth 2 (two) times daily. appetite  Dispense: 60 tablet; Refill: 11    Thyroiditis  -     megestrol (MEGACE) 40 MG Tab; Take 1 tablet (40 mg total) by mouth 2 (two) times daily. appetite  Dispense: 60 tablet; Refill: 11    Weight loss  -     megestrol (MEGACE) 40 MG Tab; Take 1 tablet (40 mg total) by mouth 2 (two) times daily. appetite  Dispense: 60 tablet; Refill: 11    Other orders  -     predniSONE (DELTASONE) 5 MG tablet; Take 2 tablets (10 mg total) by mouth once daily.  Dispense: 60 tablet; Refill: 4  -     vortioxetine 10 mg Tab; Take 1 tablet (10 mg total) by mouth once daily. Depression and anxiety  Dispense: 30 tablet; Refill: 6  -     azithromycin (Z-MEHNAZ) 250 MG tablet; Take 2 tablets by mouth on day 1; Take 1 tablet by mouth on days 2-5  anxiety  Dispense: 6 tablet; Refill: 0  -     oseltamivir (TAMIFLU) 75 MG  capsule; Take 1 capsule (75 mg total) by mouth 2 (two) times daily. flu  Dispense: 10 capsule; Refill: 0  -     hydrocodone-acetaminophen 10-325mg (NORCO)  mg Tab; Take 1 tablet by mouth every 8 (eight) hours as needed for Pain.  Dispense: 90 tablet; Refill: 0    trinrellix 1 tab in am for anxiety/ depression  seroquel for sleep  zithromax pac for infection   tamiflu for flu   we  will take over  Patient's pain management starting March

## 2018-04-13 ENCOUNTER — LAB VISIT (OUTPATIENT)
Dept: LAB | Facility: HOSPITAL | Age: 58
End: 2018-04-13
Attending: INTERNAL MEDICINE
Payer: MEDICARE

## 2018-04-13 DIAGNOSIS — L40.50 PSORIATIC ARTHRITIS: ICD-10-CM

## 2018-04-13 DIAGNOSIS — F32.A DEPRESSION, UNSPECIFIED DEPRESSION TYPE: ICD-10-CM

## 2018-04-13 DIAGNOSIS — F41.9 ANXIETY DISORDER, UNSPECIFIED TYPE: ICD-10-CM

## 2018-04-13 DIAGNOSIS — M79.7 FIBROMYALGIA: ICD-10-CM

## 2018-04-13 DIAGNOSIS — R79.9 ABNORMAL FINDING OF BLOOD CHEMISTRY: ICD-10-CM

## 2018-04-13 LAB
ALBUMIN SERPL BCP-MCNC: 4 G/DL
ALP SERPL-CCNC: 94 U/L
ALT SERPL W/O P-5'-P-CCNC: 11 U/L
ANION GAP SERPL CALC-SCNC: 8 MMOL/L
AST SERPL-CCNC: 14 U/L
BASOPHILS # BLD AUTO: 0.09 K/UL
BASOPHILS NFR BLD: 1.1 %
BILIRUB SERPL-MCNC: 0.1 MG/DL
BUN SERPL-MCNC: 19 MG/DL
CALCIUM SERPL-MCNC: 9.9 MG/DL
CHLORIDE SERPL-SCNC: 104 MMOL/L
CO2 SERPL-SCNC: 27 MMOL/L
CREAT SERPL-MCNC: 0.8 MG/DL
CRP SERPL-MCNC: 1.3 MG/L
DIFFERENTIAL METHOD: ABNORMAL
EOSINOPHIL # BLD AUTO: 0.3 K/UL
EOSINOPHIL NFR BLD: 3.4 %
ERYTHROCYTE [DISTWIDTH] IN BLOOD BY AUTOMATED COUNT: 11.9 %
ERYTHROCYTE [SEDIMENTATION RATE] IN BLOOD BY WESTERGREN METHOD: 5 MM/HR
EST. GFR  (AFRICAN AMERICAN): >60 ML/MIN/1.73 M^2
EST. GFR  (NON AFRICAN AMERICAN): >60 ML/MIN/1.73 M^2
ESTIMATED AVG GLUCOSE: 94 MG/DL
GLUCOSE SERPL-MCNC: 89 MG/DL
HBA1C MFR BLD HPLC: 4.9 %
HCT VFR BLD AUTO: 40.2 %
HGB BLD-MCNC: 13.2 G/DL
IMM GRANULOCYTES # BLD AUTO: 0.02 K/UL
IMM GRANULOCYTES NFR BLD AUTO: 0.2 %
IRON SERPL-MCNC: 70 UG/DL
LYMPHOCYTES # BLD AUTO: 2.5 K/UL
LYMPHOCYTES NFR BLD: 29.8 %
MCH RBC QN AUTO: 33.2 PG
MCHC RBC AUTO-ENTMCNC: 32.8 G/DL
MCV RBC AUTO: 101 FL
MONOCYTES # BLD AUTO: 0.7 K/UL
MONOCYTES NFR BLD: 8.5 %
NEUTROPHILS # BLD AUTO: 4.9 K/UL
NEUTROPHILS NFR BLD: 57 %
NRBC BLD-RTO: 0 /100 WBC
PLATELET # BLD AUTO: 294 K/UL
PMV BLD AUTO: 9.5 FL
POTASSIUM SERPL-SCNC: 4.6 MMOL/L
PROT SERPL-MCNC: 7.1 G/DL
RBC # BLD AUTO: 3.98 M/UL
SATURATED IRON: 20 %
SODIUM SERPL-SCNC: 139 MMOL/L
T4 FREE SERPL-MCNC: 0.82 NG/DL
TOTAL IRON BINDING CAPACITY: 348 UG/DL
TRANSFERRIN SERPL-MCNC: 235 MG/DL
TSH SERPL DL<=0.005 MIU/L-ACNC: 4.29 UIU/ML
WBC # BLD AUTO: 8.51 K/UL

## 2018-04-13 PROCEDURE — 85651 RBC SED RATE NONAUTOMATED: CPT | Mod: PO

## 2018-04-13 PROCEDURE — 84439 ASSAY OF FREE THYROXINE: CPT

## 2018-04-13 PROCEDURE — 85025 COMPLETE CBC W/AUTO DIFF WBC: CPT

## 2018-04-13 PROCEDURE — 84443 ASSAY THYROID STIM HORMONE: CPT

## 2018-04-13 PROCEDURE — 80053 COMPREHEN METABOLIC PANEL: CPT

## 2018-04-13 PROCEDURE — 83540 ASSAY OF IRON: CPT

## 2018-04-13 PROCEDURE — 86140 C-REACTIVE PROTEIN: CPT

## 2018-04-13 PROCEDURE — 86038 ANTINUCLEAR ANTIBODIES: CPT

## 2018-04-13 PROCEDURE — 83036 HEMOGLOBIN GLYCOSYLATED A1C: CPT

## 2018-04-13 PROCEDURE — 36415 COLL VENOUS BLD VENIPUNCTURE: CPT | Mod: PO

## 2018-04-16 LAB — ANA SER QL IF: NORMAL

## 2018-04-17 RX ORDER — BUTALBITAL, ACETAMINOPHEN AND CAFFEINE 50; 325; 40 MG/1; MG/1; MG/1
TABLET ORAL
Qty: 100 TABLET | Refills: 3 | Status: SHIPPED | OUTPATIENT
Start: 2018-04-17 | End: 2018-07-12 | Stop reason: SDUPTHER

## 2018-04-17 RX ORDER — HYDROCODONE BITARTRATE AND ACETAMINOPHEN 10; 325 MG/1; MG/1
1 TABLET ORAL EVERY 8 HOURS PRN
Qty: 90 TABLET | Refills: 0 | Status: SHIPPED | OUTPATIENT
Start: 2018-04-17 | End: 2018-05-18 | Stop reason: SDUPTHER

## 2018-04-17 RX ORDER — PREDNISONE 5 MG/1
10 TABLET ORAL DAILY
Refills: 4 | COMMUNITY
Start: 2018-03-10 | End: 2018-12-21 | Stop reason: SDUPTHER

## 2018-04-17 NOTE — TELEPHONE ENCOUNTER
----- Message from Hilary Wright sent at 4/17/2018 10:20 AM CDT -----  Contact: pt [phone]  Refill Patient called for a refill on her Hydrocodone and her Fioricet  To be called into [facility]'s pharmacy.

## 2018-05-18 NOTE — TELEPHONE ENCOUNTER
----- Message from Zeke Sim sent at 5/18/2018  8:14 AM CDT -----  Contact: self  Type:  RX Refill Request    Who Called: patient   Refill or New Rx: refill   RX Name and Strength: hydrocodone-acetaminophen 10-325mg (NORCO)  mg Tab  Preferred Pharmacy with phone number:      [facility] [address]  [address] [address]  Phone: [phone] Fax: [phone]    Best Call Back Number: [phone] (home)

## 2018-05-18 NOTE — TELEPHONE ENCOUNTER
Returned patient call and informed refill request has been received and sent to Dr Rodriguez. Advised to contact pharmacy this afternoon to see if refill has been sent. Patient will be out of medication on Saturday.  checked last filled 4-20-18 last office visit 1-12-18 next visit 6-12-18

## 2018-05-21 NOTE — TELEPHONE ENCOUNTER
----- Message from Pari Estrella sent at 5/21/2018  8:15 AM CDT -----  Contact: ryan  A request was put in on Friday morning after the pharmacy told her they had a prescription. She said the pharmacy mixed her up. She has gone the weekend with no meds for her neck has been barely able to move.   Rx hydrocodone-acetaminophen 10-325mg (NORCO)  mg Tab 90 tablet     [facility] [address]  [address] [address]  Phone: [phone] Fax: [phone]    Patient's call back [phone] (home)   Thanks!

## 2018-05-22 RX ORDER — HYDROCODONE BITARTRATE AND ACETAMINOPHEN 10; 325 MG/1; MG/1
1 TABLET ORAL EVERY 8 HOURS PRN
Qty: 90 TABLET | Refills: 0 | Status: SHIPPED | OUTPATIENT
Start: 2018-05-22 | End: 2018-06-12 | Stop reason: SDUPTHER

## 2018-05-22 RX ORDER — HYDROCODONE BITARTRATE AND ACETAMINOPHEN 10; 325 MG/1; MG/1
1 TABLET ORAL EVERY 8 HOURS PRN
Qty: 90 TABLET | Refills: 0 | OUTPATIENT
Start: 2018-05-22

## 2018-05-22 NOTE — TELEPHONE ENCOUNTER
----- Message from Sarah Beth Lockhart sent at 5/22/2018  8:15 AM CDT -----  Contact: Patient  Barbara, patient [phone] calling because she was told that the prescription for hydrocodone-acetaminophen 10-325mg (NORCO)  mg Tab was going to be sent to pharmacy yesterday but they have not received anything. Patient has been out of medication and needs it refilled. Please advise. Thanks.    [facility]  [address] [address]  Phone: [phone] Fax: [phone]

## 2018-05-22 NOTE — TELEPHONE ENCOUNTER
Spoke to pt, advised that Dr. Rodriguez has addressed her refill and sent to [facility]'s pharmacy. Reminded pt that refill requests in the future are a 72 business hour request. Pt verbalized understanding.

## 2018-06-12 ENCOUNTER — OFFICE VISIT (OUTPATIENT)
Dept: RHEUMATOLOGY | Facility: CLINIC | Age: 58
End: 2018-06-12
Payer: MEDICARE

## 2018-06-12 VITALS
HEART RATE: 84 BPM | DIASTOLIC BLOOD PRESSURE: 92 MMHG | HEIGHT: 59 IN | BODY MASS INDEX: 20.31 KG/M2 | SYSTOLIC BLOOD PRESSURE: 134 MMHG | WEIGHT: 100.75 LBS

## 2018-06-12 DIAGNOSIS — R52 PAIN MANAGEMENT: ICD-10-CM

## 2018-06-12 DIAGNOSIS — F32.A DEPRESSION, UNSPECIFIED DEPRESSION TYPE: ICD-10-CM

## 2018-06-12 DIAGNOSIS — M79.7 FIBROMYALGIA: ICD-10-CM

## 2018-06-12 DIAGNOSIS — E06.9 THYROIDITIS: ICD-10-CM

## 2018-06-12 DIAGNOSIS — L40.8 PSORIASIS WITH PUSTULES: ICD-10-CM

## 2018-06-12 DIAGNOSIS — I73.00 RAYNAUD'S DISEASE WITHOUT GANGRENE: ICD-10-CM

## 2018-06-12 DIAGNOSIS — L40.50 PSORIATIC ARTHRITIS: Primary | ICD-10-CM

## 2018-06-12 DIAGNOSIS — F41.9 ANXIETY DISORDER, UNSPECIFIED TYPE: ICD-10-CM

## 2018-06-12 PROCEDURE — 99213 OFFICE O/P EST LOW 20 MIN: CPT | Mod: PBBFAC,PO | Performed by: INTERNAL MEDICINE

## 2018-06-12 PROCEDURE — 99215 OFFICE O/P EST HI 40 MIN: CPT | Mod: S$PBB,,, | Performed by: INTERNAL MEDICINE

## 2018-06-12 PROCEDURE — 99999 PR PBB SHADOW E&M-EST. PATIENT-LVL III: CPT | Mod: PBBFAC,,, | Performed by: INTERNAL MEDICINE

## 2018-06-12 RX ORDER — PAROXETINE 10 MG/1
10 TABLET, FILM COATED ORAL EVERY MORNING
Qty: 30 TABLET | Refills: 11 | Status: SHIPPED | OUTPATIENT
Start: 2018-06-12 | End: 2018-12-21 | Stop reason: SDUPTHER

## 2018-06-12 RX ORDER — METHYLPREDNISOLONE 4 MG/1
8 TABLET ORAL DAILY
Qty: 60 TABLET | Refills: 3 | Status: SHIPPED | OUTPATIENT
Start: 2018-06-12 | End: 2018-07-12

## 2018-06-12 RX ORDER — QUETIAPINE FUMARATE 50 MG/1
150 TABLET, FILM COATED ORAL NIGHTLY
Qty: 90 TABLET | Refills: 6 | Status: SHIPPED | OUTPATIENT
Start: 2018-06-12 | End: 2018-12-21 | Stop reason: SDUPTHER

## 2018-06-12 RX ORDER — CYCLOBENZAPRINE HCL 10 MG
10 TABLET ORAL 3 TIMES DAILY
Qty: 90 TABLET | Refills: 3 | Status: SHIPPED | OUTPATIENT
Start: 2018-06-12 | End: 2018-07-12

## 2018-06-12 RX ORDER — HYDROCODONE BITARTRATE AND ACETAMINOPHEN 10; 325 MG/1; MG/1
1 TABLET ORAL EVERY 8 HOURS PRN
Qty: 90 TABLET | Refills: 0 | Status: SHIPPED | OUTPATIENT
Start: 2018-06-12 | End: 2018-06-12 | Stop reason: SDUPTHER

## 2018-06-12 RX ORDER — HYDROCODONE BITARTRATE AND ACETAMINOPHEN 10; 325 MG/1; MG/1
1 TABLET ORAL EVERY 8 HOURS PRN
Qty: 90 TABLET | Refills: 0 | Status: SHIPPED | OUTPATIENT
Start: 2018-07-12 | End: 2018-06-12 | Stop reason: SDUPTHER

## 2018-06-12 RX ORDER — LEVOTHYROXINE SODIUM 25 UG/1
25 TABLET ORAL
Qty: 30 TABLET | Refills: 6 | Status: SHIPPED | OUTPATIENT
Start: 2018-06-12 | End: 2018-12-21 | Stop reason: SDUPTHER

## 2018-06-12 RX ORDER — GABAPENTIN 300 MG/1
300 CAPSULE ORAL NIGHTLY
Qty: 30 CAPSULE | Refills: 6 | Status: SHIPPED | OUTPATIENT
Start: 2018-06-12 | End: 2018-12-21 | Stop reason: SDUPTHER

## 2018-06-12 RX ORDER — HYDROCODONE BITARTRATE AND ACETAMINOPHEN 10; 325 MG/1; MG/1
1 TABLET ORAL EVERY 8 HOURS PRN
Qty: 90 TABLET | Refills: 0 | Status: SHIPPED | OUTPATIENT
Start: 2018-08-12 | End: 2018-09-10 | Stop reason: SDUPTHER

## 2018-06-12 NOTE — PROGRESS NOTES
"Subjective:       Patient ID: Barbara Mims is a 58 y.o. female.    Chief Complaint: Follow-up   :Psoriatic arthritis    She noticed raynauds in her hands, nailbed have  Psoriatic lesions, she is having a fibromyalgia flare. The pt has not had any symptoms of fever, chills, shortness of breath, and change in activity. Noted is am gelling lasting longer than 60 min , there is still some gelling with inactivity. There is  some swelling in the knees but not warm to the touch and enthesis pain. Overall disease state is worsening without improvement .  Depressed but not losing weight. Lost her son's      Review of Systems   Constitutional: Positive for activity change and fatigue. Negative for appetite change and unexpected weight change.   HENT: Negative for dental problem, ear discharge, ear pain, facial swelling, mouth sores, nosebleeds, postnasal drip, rhinorrhea, sinus pressure, sneezing, tinnitus and voice change.    Eyes: Negative for photophobia, pain, discharge, redness and itching.   Respiratory: Negative for apnea, chest tightness, shortness of breath and wheezing.    Cardiovascular: Positive for leg swelling. Negative for palpitations.   Gastrointestinal: Negative for abdominal distention, constipation and diarrhea.   Endocrine: Negative for cold intolerance, heat intolerance, polydipsia and polyuria.   Genitourinary: Negative for decreased urine volume, difficulty urinating, flank pain, frequency, hematuria and urgency.   Musculoskeletal: Positive for back pain, gait problem and neck stiffness.   Skin: Negative for pallor and wound.        Nailbed psoriatic   Allergic/Immunologic: Negative for immunocompromised state.   Neurological: Negative for dizziness and tremors.   Hematological: Negative for adenopathy. Does not bruise/bleed easily.   Psychiatric/Behavioral: Negative for sleep disturbance. The patient is not nervous/anxious.          Objective:     BP (!) 134/92   Pulse 84   Ht 4' 11" (1.499 m)  "  Wt 45.7 kg (100 lb 12 oz)   BMI 20.35 kg/m²      Physical Exam   Nursing note and vitals reviewed.  Constitutional: She is oriented to person, place, and time. She appears distressed.   HENT:   Head: Normocephalic and atraumatic.   Mouth/Throat: Oropharynx is clear and moist.   Eyes: EOM are normal. Pupils are equal, round, and reactive to light.   Neck: Neck supple. No thyromegaly present.   Cardiovascular: Normal rate, regular rhythm and normal heart sounds.  Exam reveals no gallop and no friction rub.    No murmur heard.  Pulmonary/Chest: Breath sounds normal. She has no wheezes. She has no rales. She exhibits no tenderness.   Abdominal: There is no tenderness. There is no rebound and no guarding.       Right Side Rheumatological Exam     Examination finds the elbow normal.    The patient is tender to palpation of the shoulder, wrist, knee, 1st PIP, 1st MCP, 2nd PIP, 2nd MCP, 3rd PIP, 3rd MCP, 4th PIP, 4th MCP, 5th PIP and 5th MCP    She has swelling of the 1st PIP, 1st MCP, 2nd PIP, 2nd MCP, 3rd PIP, 3rd MCP, 4th PIP, 4th MCP, 5th PIP and 5th MCP    Shoulder Exam   Tenderness Location: no tenderness    Range of Motion   Active Abduction: abnormal   Adduction: abnormal  Sensation: normal    Knee Exam   Patellofemoral Crepitus: positive  Effusion: negative  Sensation: normal    Hip Exam   Tenderness Location: posterior  Sensation: normal    Elbow/Wrist Exam   Tenderness Location: no tenderness  Sensation: normal    Left Side Rheumatological Exam     Examination finds the elbow normal.    The patient is tender to palpation of the shoulder, wrist, knee, 1st PIP, 1st MCP, 2nd PIP, 2nd MCP, 3rd PIP, 3rd MCP, 4th PIP, 4th MCP, 5th PIP and 5th MCP.    She has swelling of the 1st PIP, 1st MCP, 2nd PIP, 2nd MCP, 3rd PIP, 3rd MCP, 4th PIP, 4th MCP, 5th PIP and 5th MCP    Shoulder Exam   Tenderness Location: no tenderness    Range of Motion   Active Abduction: abnormal   Sensation: normal    Knee Exam     Patellofemoral  Crepitus: positive  Effusion: negative  Sensation: normal    Hip Exam   Tenderness Location: posterior  Sensation: normal    Elbow/Wrist Exam   Sensation: normal      Back/Neck Exam   Neck Range of Motion   Flexion: Severely limited  Extension: Severely limited    Comments:   Incision clear    Lymphadenopathy:     She has no cervical adenopathy.   Neurological: She is alert and oriented to person, place, and time. Gait normal.   Skin: No rash noted. No erythema. No pallor.     Psychiatric: Mood and affect normal.   Musculoskeletal: She exhibits edema, tenderness and deformity.           Results for orders placed or performed in visit on 04/13/18   FERNANDA   Result Value Ref Range    FERNANDA Screen Negative <1:160 Negative <1:160   CBC auto differential   Result Value Ref Range    WBC 8.51 3.90 - 12.70 K/uL    RBC 3.98 (L) 4.00 - 5.40 M/uL    Hemoglobin 13.2 12.0 - 16.0 g/dL    Hematocrit 40.2 37.0 - 48.5 %     (H) 82 - 98 fL    MCH 33.2 (H) 27.0 - 31.0 pg    MCHC 32.8 32.0 - 36.0 g/dL    RDW 11.9 11.5 - 14.5 %    Platelets 294 150 - 350 K/uL    MPV 9.5 9.2 - 12.9 fL    Immature Granulocytes 0.2 0.0 - 0.5 %    Gran # (ANC) 4.9 1.8 - 7.7 K/uL    Immature Grans (Abs) 0.02 0.00 - 0.04 K/uL    Lymph # 2.5 1.0 - 4.8 K/uL    Mono # 0.7 0.3 - 1.0 K/uL    Eos # 0.3 0.0 - 0.5 K/uL    Baso # 0.09 0.00 - 0.20 K/uL    nRBC 0 0 /100 WBC    Gran% 57.0 38.0 - 73.0 %    Lymph% 29.8 18.0 - 48.0 %    Mono% 8.5 4.0 - 15.0 %    Eosinophil% 3.4 0.0 - 8.0 %    Basophil% 1.1 0.0 - 1.9 %    Differential Method Automated    Comprehensive metabolic panel   Result Value Ref Range    Sodium 139 136 - 145 mmol/L    Potassium 4.6 3.5 - 5.1 mmol/L    Chloride 104 95 - 110 mmol/L    CO2 27 23 - 29 mmol/L    Glucose 89 70 - 110 mg/dL    BUN, Bld 19 6 - 20 mg/dL    Creatinine 0.8 0.5 - 1.4 mg/dL    Calcium 9.9 8.7 - 10.5 mg/dL    Total Protein 7.1 6.0 - 8.4 g/dL    Albumin 4.0 3.5 - 5.2 g/dL    Total Bilirubin 0.1 0.1 - 1.0 mg/dL    Alkaline  Phosphatase 94 55 - 135 U/L    AST 14 10 - 40 U/L    ALT 11 10 - 44 U/L    Anion Gap 8 8 - 16 mmol/L    eGFR if African American >60.0 >60 mL/min/1.73 m^2    eGFR if non African American >60.0 >60 mL/min/1.73 m^2   C-reactive protein   Result Value Ref Range    CRP 1.3 0.0 - 8.2 mg/L   Sedimentation rate, manual   Result Value Ref Range    Sed Rate 5 0 - 20 mm/Hr   TSH   Result Value Ref Range    TSH 4.290 (H) 0.400 - 4.000 uIU/mL   Hemoglobin A1c   Result Value Ref Range    Hemoglobin A1C 4.9 4.0 - 5.6 %    Estimated Avg Glucose 94 68 - 131 mg/dL   Iron and TIBC   Result Value Ref Range    Iron 70 30 - 160 ug/dL    Transferrin 235 200 - 375 mg/dL    TIBC 348 250 - 450 ug/dL    Saturated Iron 20 20 - 50 %   T4, free   Result Value Ref Range    Free T4 0.82 0.71 - 1.51 ng/dL       Assessment:       Encounter Diagnoses   Name Primary?    Psoriatic arthritis Yes    Anxiety disorder, unspecified type     Depression, unspecified depression type     Fibromyalgia     Raynaud's disease without gangrene     Thyroiditis     Psoriasis with pustules     Pain management     Comment: monitoring toxicity         Plan:     Psoriatic arthritis  -     paroxetine (PAXIL) 10 MG tablet; Take 1 tablet (10 mg total) by mouth every morning.  Dispense: 30 tablet; Refill: 11  -     QUEtiapine (SEROQUEL) 50 MG tablet; Take 3 tablets (150 mg total) by mouth every evening.  Dispense: 90 tablet; Refill: 6  -     cyclobenzaprine (FLEXERIL) 10 MG tablet; Take 1 tablet (10 mg total) by mouth 3 (three) times daily.  Dispense: 90 tablet; Refill: 3  -     gabapentin (NEURONTIN) 300 MG capsule; Take 1 capsule (300 mg total) by mouth nightly.  Dispense: 30 capsule; Refill: 6  -     methylPREDNISolone (MEDROL) 4 MG Tab; Take 2 tablets (8 mg total) by mouth once daily. In am  Dispense: 60 tablet; Refill: 3  -     Comprehensive metabolic panel; Future; Expected date: 06/12/2018  -     CBC auto differential; Future; Expected date: 06/12/2018  -      C-reactive protein; Future; Expected date: 06/12/2018  -     Sedimentation rate; Future; Expected date: 06/12/2018  -     TSH; Future; Expected date: 06/12/2018  -     T4, free; Future; Expected date: 06/12/2018  -     levothyroxine (SYNTHROID) 25 MCG tablet; Take 1 tablet (25 mcg total) by mouth before breakfast.  Dispense: 30 tablet; Refill: 6    Anxiety disorder, unspecified type  -     paroxetine (PAXIL) 10 MG tablet; Take 1 tablet (10 mg total) by mouth every morning.  Dispense: 30 tablet; Refill: 11  -     QUEtiapine (SEROQUEL) 50 MG tablet; Take 3 tablets (150 mg total) by mouth every evening.  Dispense: 90 tablet; Refill: 6  -     cyclobenzaprine (FLEXERIL) 10 MG tablet; Take 1 tablet (10 mg total) by mouth 3 (three) times daily.  Dispense: 90 tablet; Refill: 3  -     gabapentin (NEURONTIN) 300 MG capsule; Take 1 capsule (300 mg total) by mouth nightly.  Dispense: 30 capsule; Refill: 6  -     methylPREDNISolone (MEDROL) 4 MG Tab; Take 2 tablets (8 mg total) by mouth once daily. In am  Dispense: 60 tablet; Refill: 3  -     Comprehensive metabolic panel; Future; Expected date: 06/12/2018  -     CBC auto differential; Future; Expected date: 06/12/2018  -     C-reactive protein; Future; Expected date: 06/12/2018  -     Sedimentation rate; Future; Expected date: 06/12/2018  -     TSH; Future; Expected date: 06/12/2018  -     T4, free; Future; Expected date: 06/12/2018  -     levothyroxine (SYNTHROID) 25 MCG tablet; Take 1 tablet (25 mcg total) by mouth before breakfast.  Dispense: 30 tablet; Refill: 6    Depression, unspecified depression type  -     paroxetine (PAXIL) 10 MG tablet; Take 1 tablet (10 mg total) by mouth every morning.  Dispense: 30 tablet; Refill: 11  -     QUEtiapine (SEROQUEL) 50 MG tablet; Take 3 tablets (150 mg total) by mouth every evening.  Dispense: 90 tablet; Refill: 6  -     cyclobenzaprine (FLEXERIL) 10 MG tablet; Take 1 tablet (10 mg total) by mouth 3 (three) times daily.  Dispense: 90  tablet; Refill: 3  -     gabapentin (NEURONTIN) 300 MG capsule; Take 1 capsule (300 mg total) by mouth nightly.  Dispense: 30 capsule; Refill: 6  -     methylPREDNISolone (MEDROL) 4 MG Tab; Take 2 tablets (8 mg total) by mouth once daily. In am  Dispense: 60 tablet; Refill: 3  -     Comprehensive metabolic panel; Future; Expected date: 06/12/2018  -     CBC auto differential; Future; Expected date: 06/12/2018  -     C-reactive protein; Future; Expected date: 06/12/2018  -     Sedimentation rate; Future; Expected date: 06/12/2018  -     TSH; Future; Expected date: 06/12/2018  -     T4, free; Future; Expected date: 06/12/2018  -     levothyroxine (SYNTHROID) 25 MCG tablet; Take 1 tablet (25 mcg total) by mouth before breakfast.  Dispense: 30 tablet; Refill: 6    Fibromyalgia  -     paroxetine (PAXIL) 10 MG tablet; Take 1 tablet (10 mg total) by mouth every morning.  Dispense: 30 tablet; Refill: 11  -     QUEtiapine (SEROQUEL) 50 MG tablet; Take 3 tablets (150 mg total) by mouth every evening.  Dispense: 90 tablet; Refill: 6  -     cyclobenzaprine (FLEXERIL) 10 MG tablet; Take 1 tablet (10 mg total) by mouth 3 (three) times daily.  Dispense: 90 tablet; Refill: 3  -     gabapentin (NEURONTIN) 300 MG capsule; Take 1 capsule (300 mg total) by mouth nightly.  Dispense: 30 capsule; Refill: 6  -     methylPREDNISolone (MEDROL) 4 MG Tab; Take 2 tablets (8 mg total) by mouth once daily. In am  Dispense: 60 tablet; Refill: 3  -     Comprehensive metabolic panel; Future; Expected date: 06/12/2018  -     CBC auto differential; Future; Expected date: 06/12/2018  -     C-reactive protein; Future; Expected date: 06/12/2018  -     Sedimentation rate; Future; Expected date: 06/12/2018  -     TSH; Future; Expected date: 06/12/2018  -     T4, free; Future; Expected date: 06/12/2018  -     levothyroxine (SYNTHROID) 25 MCG tablet; Take 1 tablet (25 mcg total) by mouth before breakfast.  Dispense: 30 tablet; Refill: 6    Raynaud's disease  without gangrene  -     paroxetine (PAXIL) 10 MG tablet; Take 1 tablet (10 mg total) by mouth every morning.  Dispense: 30 tablet; Refill: 11  -     QUEtiapine (SEROQUEL) 50 MG tablet; Take 3 tablets (150 mg total) by mouth every evening.  Dispense: 90 tablet; Refill: 6  -     cyclobenzaprine (FLEXERIL) 10 MG tablet; Take 1 tablet (10 mg total) by mouth 3 (three) times daily.  Dispense: 90 tablet; Refill: 3  -     gabapentin (NEURONTIN) 300 MG capsule; Take 1 capsule (300 mg total) by mouth nightly.  Dispense: 30 capsule; Refill: 6  -     methylPREDNISolone (MEDROL) 4 MG Tab; Take 2 tablets (8 mg total) by mouth once daily. In am  Dispense: 60 tablet; Refill: 3  -     Comprehensive metabolic panel; Future; Expected date: 06/12/2018  -     CBC auto differential; Future; Expected date: 06/12/2018  -     C-reactive protein; Future; Expected date: 06/12/2018  -     Sedimentation rate; Future; Expected date: 06/12/2018  -     TSH; Future; Expected date: 06/12/2018  -     T4, free; Future; Expected date: 06/12/2018  -     levothyroxine (SYNTHROID) 25 MCG tablet; Take 1 tablet (25 mcg total) by mouth before breakfast.  Dispense: 30 tablet; Refill: 6    Thyroiditis  -     paroxetine (PAXIL) 10 MG tablet; Take 1 tablet (10 mg total) by mouth every morning.  Dispense: 30 tablet; Refill: 11  -     QUEtiapine (SEROQUEL) 50 MG tablet; Take 3 tablets (150 mg total) by mouth every evening.  Dispense: 90 tablet; Refill: 6  -     cyclobenzaprine (FLEXERIL) 10 MG tablet; Take 1 tablet (10 mg total) by mouth 3 (three) times daily.  Dispense: 90 tablet; Refill: 3  -     gabapentin (NEURONTIN) 300 MG capsule; Take 1 capsule (300 mg total) by mouth nightly.  Dispense: 30 capsule; Refill: 6  -     methylPREDNISolone (MEDROL) 4 MG Tab; Take 2 tablets (8 mg total) by mouth once daily. In am  Dispense: 60 tablet; Refill: 3  -     Comprehensive metabolic panel; Future; Expected date: 06/12/2018  -     CBC auto differential; Future; Expected  date: 06/12/2018  -     C-reactive protein; Future; Expected date: 06/12/2018  -     Sedimentation rate; Future; Expected date: 06/12/2018  -     TSH; Future; Expected date: 06/12/2018  -     T4, free; Future; Expected date: 06/12/2018  -     levothyroxine (SYNTHROID) 25 MCG tablet; Take 1 tablet (25 mcg total) by mouth before breakfast.  Dispense: 30 tablet; Refill: 6    Psoriasis with pustules  -     paroxetine (PAXIL) 10 MG tablet; Take 1 tablet (10 mg total) by mouth every morning.  Dispense: 30 tablet; Refill: 11  -     QUEtiapine (SEROQUEL) 50 MG tablet; Take 3 tablets (150 mg total) by mouth every evening.  Dispense: 90 tablet; Refill: 6  -     cyclobenzaprine (FLEXERIL) 10 MG tablet; Take 1 tablet (10 mg total) by mouth 3 (three) times daily.  Dispense: 90 tablet; Refill: 3  -     gabapentin (NEURONTIN) 300 MG capsule; Take 1 capsule (300 mg total) by mouth nightly.  Dispense: 30 capsule; Refill: 6  -     methylPREDNISolone (MEDROL) 4 MG Tab; Take 2 tablets (8 mg total) by mouth once daily. In am  Dispense: 60 tablet; Refill: 3  -     Comprehensive metabolic panel; Future; Expected date: 06/12/2018  -     CBC auto differential; Future; Expected date: 06/12/2018  -     C-reactive protein; Future; Expected date: 06/12/2018  -     Sedimentation rate; Future; Expected date: 06/12/2018  -     TSH; Future; Expected date: 06/12/2018  -     T4, free; Future; Expected date: 06/12/2018  -     levothyroxine (SYNTHROID) 25 MCG tablet; Take 1 tablet (25 mcg total) by mouth before breakfast.  Dispense: 30 tablet; Refill: 6    Pain management  Comments:  monitoring toxicity    Other orders  -     Discontinue: HYDROcodone-acetaminophen (NORCO)  mg per tablet; Take 1 tablet by mouth every 8 (eight) hours as needed for Pain.  Dispense: 90 tablet; Refill: 0  -     Discontinue: HYDROcodone-acetaminophen (NORCO)  mg per tablet; Take 1 tablet by mouth every 8 (eight) hours as needed for Pain.  Dispense: 90 tablet;  Refill: 0  -     HYDROcodone-acetaminophen (NORCO)  mg per tablet; Take 1 tablet by mouth every 8 (eight) hours as needed for Pain.  Dispense: 90 tablet; Refill: 0    Add paxil 10 mg in am  Increase seroquel 150 mg at night  Medrol 2 tab in am   Add synthroid 25 mg in am  Refilled pain meds

## 2018-06-12 NOTE — PATIENT INSTRUCTIONS
Add paxil 10 mg in am  Increase seroquel 150 mg at night  Medrol 2 tab in am   Add synthroid 25 mg in am  Refilled pain meds

## 2018-07-12 NOTE — TELEPHONE ENCOUNTER
----- Message from Ira Malik sent at 7/12/2018 12:53 PM CDT -----  Type:  RX Refill Request    Who Called: patient  Refill or New Rx: refill  RX Name and Strength: butalbital-acetaminophen-caffeine -40 mg (FIORICET, ESGIC) -40 mg per tablet   How is the patient currently taking it? (ex. 1XDay):  Take 1 tablet by mouth every 6 (six) hours as needed for Pain or Headaches.  Is this a 30 day or 90 day RX:     Preferred Pharmacy with phone number:  [facility] [place], [place] [facility] [address]  [address] [address]  Phone: [phone] Fax: [phone]      Local or Mail Order:  local  Ordering Provider:  Jennifer Bang Call Back Number:  [phone] (home)     Additional Information:  Patient calling for refill so that it can be at pharmacy when it is time to get per patient

## 2018-07-13 RX ORDER — BUTALBITAL, ACETAMINOPHEN AND CAFFEINE 50; 325; 40 MG/1; MG/1; MG/1
TABLET ORAL
Qty: 100 TABLET | Refills: 3 | Status: SHIPPED | OUTPATIENT
Start: 2018-07-13 | End: 2018-10-11 | Stop reason: SDUPTHER

## 2018-09-10 NOTE — TELEPHONE ENCOUNTER
----- Message from Francine Montanez sent at 9/10/2018  8:24 AM CDT -----  Contact: Patient  Type:  RX Refill Request    Who Called: patient  Refill or New Rx:  refill  RX Name and Strength:  HYDROcodone-acetaminophen (NORCO)  mg per tablet  How is the patient currently taking it? (ex. 1XDay):  3x day  Is this a 30 day or 90 day RX:  30 day  Pref[facility] Pharmacy with phone number:   [facility] [address]  [address] [address]  Phone: [phone] Fax: [phone]    Local or Mail Order:  local  Ordering Provider: Micheal Rodriguez  Best Call Back Number:  [phone] (home)     Additional Information:  Patient states that the pharmacy has sent a request over via fax three times, she says that she will be out of her medication within a few days. Please contact to update. Thank you!

## 2018-09-11 NOTE — TELEPHONE ENCOUNTER
----- Message from Pari Estrella sent at 9/11/2018 11:05 AM CDT -----  Contact: ryan Jimenez states her refill request was sent 4 time last week from her pharmacy  Rx HYDROcodone-acetaminophen (NORCO)  mg per tablet 90 tablet     [facility] - [facility] [place] [facility] [address]  [address] [address]  Phone: [phone] Fax: [phone]    She states they are due it the morning, call her back [phone] (home)   Thanks

## 2018-09-13 RX ORDER — HYDROCODONE BITARTRATE AND ACETAMINOPHEN 10; 325 MG/1; MG/1
1 TABLET ORAL EVERY 8 HOURS PRN
Qty: 90 TABLET | Refills: 0 | Status: SHIPPED | OUTPATIENT
Start: 2018-09-13 | End: 2018-10-11 | Stop reason: SDUPTHER

## 2018-10-11 NOTE — TELEPHONE ENCOUNTER
----- Message from Jia Salinas sent at 10/11/2018  1:21 PM CDT -----  Contact: Self  Patient states her pharmacy has been sending a request to fill the below medications with no response     HYDROcodone-acetaminophen (NORCO)  mg per tablet    Take 1 tablet by mouth every 8 (eight) hours   90    butalbital-acetaminophen-caffeine -40 mg (FIORICET, ESGIC) -40 mg per tablet  Take 1 tablet by mouth every 6 (six) hours as needed for Pain or Headaches.  100    Please call to advise [phone]   Patient states the Pharmacy closes on Saturday and needs these tomorrow       [facility] [place] - [address]  [address]  [place] [address]  Phone: [phone] Fax: [phone]

## 2018-10-12 NOTE — TELEPHONE ENCOUNTER
----- Message from Chano Werner sent at 10/12/2018 10:16 AM CDT -----  Type: Needs Medical Advice    Who Called:      Pharmacy name and phone #:   [facility] - [facility] - [place], [place] - [address]  [address] [facility] [address]  Phone: [phone] Fax: [phone]      Best Call Back Number: [phone]  Additional Information:  Patient calling. States that her pharmacy has been faxing the office for 4 days for a refill of HYDROcodone-acetaminophen (NORCO)  mg per tablet and herbutalbital-acetaminophen-caffeine -40 mg (FIORICET, ESGIC) -40 mg per tablet   with no results.  Patient states that she needs these refilled asap or she will be out for the weekend   Please call to advise

## 2018-10-12 NOTE — TELEPHONE ENCOUNTER
----- Message from Francine Montanez sent at 10/12/2018  3:29 PM CDT -----  Contact: Patient  Type:  RX Refill Request    Who Called: Patient  Refill or New Rx:  refill  RX Name and Strength:  HYDROcodone-acetaminophen (NORCO)  mg per tablet  How is the patient currently taking it? (ex. 1XDay):  3x day  Is this a 30 day or 90 day RX:  30 day  Preferred Pharm[facility] with phone number:    [facility] [address] [address] [facility] [address]  Phone: [phone] Fax: [phone]     Local or Mail Order:  local  Ordering Provider:  Dr. Jennifer Bang Call Back Number:  [phone] (home)

## 2018-10-12 NOTE — TELEPHONE ENCOUNTER
----- Message from Francine Montanez sent at 10/12/2018  3:28 PM CDT -----  Contact: Patient  Type:  RX Refill Request    Who Called: Patient  Refill or New Rx:  refill  RX Name and Strength:  butalbital-acetaminophen-caffeine -40 mg (FIORICET, ESGIC) -40 mg per tablet  How is the patient currently taking it? (ex. 1XDay): 4x day  Is this a 30 day or 90 day RX:  30 day  [facility]erred Pharmacy with phone number:    [facility] [place] [place] [facility] [address]  [address] [address]  Phone: [phone] Fax: [phone]    Local or Mail Order: local  Ordering Provider:  Dr. Jennifer Bang Call Back Number:  [phone] (home)

## 2018-10-13 RX ORDER — HYDROCODONE BITARTRATE AND ACETAMINOPHEN 10; 325 MG/1; MG/1
1 TABLET ORAL EVERY 8 HOURS PRN
Qty: 90 TABLET | Refills: 0 | Status: SHIPPED | OUTPATIENT
Start: 2018-10-13 | End: 2018-11-15 | Stop reason: SDUPTHER

## 2018-10-13 RX ORDER — BUTALBITAL, ACETAMINOPHEN AND CAFFEINE 50; 325; 40 MG/1; MG/1; MG/1
TABLET ORAL
Qty: 100 TABLET | Refills: 3 | Status: SHIPPED | OUTPATIENT
Start: 2018-10-13 | End: 2018-12-21 | Stop reason: SDUPTHER

## 2018-10-15 ENCOUNTER — TELEPHONE (OUTPATIENT)
Dept: RHEUMATOLOGY | Facility: CLINIC | Age: 58
End: 2018-10-15

## 2018-10-15 NOTE — TELEPHONE ENCOUNTER
----- Message from Chano Werner sent at 10/12/2018  4:35 PM CDT -----  Type: Needs Medical Advice    Who Called:  Patient    Pharmacy name and phone #:    [facility] - [facility] [place] - [address]  [address]  [place] [address]  Phone: [phone] Fax: [phone]      Best Call Back Number: [phone]  Additional Information: Patient calling.  States that she needs refills on her butalbital-acetaminophen-caffeine -40 mg (FIORICET, ESGIC) -40 mg per tablet   And HYDROcodone-acetaminophen (NORCO)  mg per tablet and she will be completely out by tomorrow.  Patient states that the pharmacy closes at 6:00pm today and 2:00 on Saturday and closed on Sunday.  She also states that the pharmacy faxed requests 4 times since last Tuesday

## 2018-11-15 NOTE — TELEPHONE ENCOUNTER
----- Message from Kenyatta Richard sent at 11/15/2018 10:33 AM CST -----  Contact: Patient Herself                                                   MEDICATION  REFILL  REQUEST      Please refill the medication(s) listed below. Please call the patient when the prescription(s) is ready for  at this phone number   Patient  /  Ph# [phone]        Medication #1 HYDROcodone-acetaminophen (NORCO)  mg per tablet        Preferred Pharmacy: [address]     [phone] (Phone)  [phone] (Fax)

## 2018-11-16 NOTE — TELEPHONE ENCOUNTER
----- Message from RT Hemalatha sent at 11/16/2018 10:40 AM CST -----  Contact: pt    pt , checking status of medication refill: Norco and the [facility] closes at 02:00 pm, Saturday and been waiting since Monday,thanks.

## 2018-11-17 NOTE — TELEPHONE ENCOUNTER
----- Message from Oliver Saul sent at 11/16/2018  3:07 PM CST -----  Contact: pt  Pt is requesting a refill on her HYDROcodone-acetaminophen (NORCO)  mg per tablet(pt is currently out and says that her pharmacy has been trying to reach the office all week) pls call pt back and advise   Call Back#[phone]  Thanks    [facility] - [place]  [address]  [address]  [place] [address]  Phone: [phone] Fax: [phone]

## 2018-11-18 RX ORDER — HYDROCODONE BITARTRATE AND ACETAMINOPHEN 10; 325 MG/1; MG/1
1 TABLET ORAL EVERY 8 HOURS PRN
Qty: 90 TABLET | Refills: 0 | Status: SHIPPED | OUTPATIENT
Start: 2018-11-18 | End: 2018-11-19 | Stop reason: SDUPTHER

## 2018-11-19 ENCOUNTER — TELEPHONE (OUTPATIENT)
Dept: RHEUMATOLOGY | Facility: CLINIC | Age: 58
End: 2018-11-19

## 2018-11-19 RX ORDER — HYDROCODONE BITARTRATE AND ACETAMINOPHEN 10; 325 MG/1; MG/1
1 TABLET ORAL EVERY 8 HOURS PRN
Qty: 90 TABLET | Refills: 0 | Status: SHIPPED | OUTPATIENT
Start: 2018-11-19 | End: 2018-12-21 | Stop reason: SDUPTHER

## 2018-11-19 NOTE — TELEPHONE ENCOUNTER
----- Message from Brien Morillo sent at 11/19/2018  8:12 AM CST -----  Contact: pt  She's calling in regards to her RX medication for Norco's been faxed to the pharmacy, [phone] (home)

## 2018-11-19 NOTE — TELEPHONE ENCOUNTER
----- Message from Hanna Figueroa sent at 11/19/2018 11:29 AM CST -----  Contact: Patient  Patient is calling for a refill on her HYDROcodone-acetaminophen (NORCO)  mg per tablet, and it states in the system that it was sent to the pharmacy yesterday, but the transmission failed.  Patient has been out of her medication and is in a lot of pain.  Please call patient to discuss.  Call Back#[phone]  Thanks     [facility] - [facility] - [place], [place] - [address]  [address] [facility] [address]  Phone: [phone] Fax: [phone]

## 2018-12-13 ENCOUNTER — LAB VISIT (OUTPATIENT)
Dept: LAB | Facility: HOSPITAL | Age: 58
End: 2018-12-13
Attending: INTERNAL MEDICINE
Payer: MEDICARE

## 2018-12-13 DIAGNOSIS — E06.9 THYROIDITIS: ICD-10-CM

## 2018-12-13 DIAGNOSIS — L40.50 PSORIATIC ARTHRITIS: ICD-10-CM

## 2018-12-13 DIAGNOSIS — M79.7 FIBROMYALGIA: ICD-10-CM

## 2018-12-13 DIAGNOSIS — I73.00 RAYNAUD'S DISEASE WITHOUT GANGRENE: ICD-10-CM

## 2018-12-13 DIAGNOSIS — F41.9 ANXIETY DISORDER, UNSPECIFIED TYPE: ICD-10-CM

## 2018-12-13 DIAGNOSIS — L40.8 PSORIASIS WITH PUSTULES: ICD-10-CM

## 2018-12-13 DIAGNOSIS — F32.A DEPRESSION, UNSPECIFIED DEPRESSION TYPE: ICD-10-CM

## 2018-12-13 LAB
ALBUMIN SERPL BCP-MCNC: 4.3 G/DL
ALP SERPL-CCNC: 104 U/L
ALT SERPL W/O P-5'-P-CCNC: 15 U/L
ANION GAP SERPL CALC-SCNC: 11 MMOL/L
AST SERPL-CCNC: 19 U/L
BASOPHILS # BLD AUTO: 0.11 K/UL
BASOPHILS NFR BLD: 1.2 %
BILIRUB SERPL-MCNC: 0.2 MG/DL
BUN SERPL-MCNC: 12 MG/DL
CALCIUM SERPL-MCNC: 9.4 MG/DL
CHLORIDE SERPL-SCNC: 105 MMOL/L
CO2 SERPL-SCNC: 26 MMOL/L
CREAT SERPL-MCNC: 0.8 MG/DL
CRP SERPL-MCNC: 3.4 MG/L
DIFFERENTIAL METHOD: ABNORMAL
EOSINOPHIL # BLD AUTO: 0.3 K/UL
EOSINOPHIL NFR BLD: 3.6 %
ERYTHROCYTE [DISTWIDTH] IN BLOOD BY AUTOMATED COUNT: 12 %
ERYTHROCYTE [SEDIMENTATION RATE] IN BLOOD BY WESTERGREN METHOD: 1 MM/HR
EST. GFR  (AFRICAN AMERICAN): >60 ML/MIN/1.73 M^2
EST. GFR  (NON AFRICAN AMERICAN): >60 ML/MIN/1.73 M^2
GLUCOSE SERPL-MCNC: 97 MG/DL
HCT VFR BLD AUTO: 39.5 %
HGB BLD-MCNC: 12.8 G/DL
IMM GRANULOCYTES # BLD AUTO: 0.03 K/UL
IMM GRANULOCYTES NFR BLD AUTO: 0.3 %
LYMPHOCYTES # BLD AUTO: 3.2 K/UL
LYMPHOCYTES NFR BLD: 36.4 %
MCH RBC QN AUTO: 32.3 PG
MCHC RBC AUTO-ENTMCNC: 32.4 G/DL
MCV RBC AUTO: 100 FL
MONOCYTES # BLD AUTO: 0.6 K/UL
MONOCYTES NFR BLD: 7.1 %
NEUTROPHILS # BLD AUTO: 4.6 K/UL
NEUTROPHILS NFR BLD: 51.4 %
NRBC BLD-RTO: 0 /100 WBC
PLATELET # BLD AUTO: 310 K/UL
PMV BLD AUTO: 9.9 FL
POTASSIUM SERPL-SCNC: 4.7 MMOL/L
PROT SERPL-MCNC: 7.7 G/DL
RBC # BLD AUTO: 3.96 M/UL
SODIUM SERPL-SCNC: 142 MMOL/L
T4 FREE SERPL-MCNC: 0.8 NG/DL
TSH SERPL DL<=0.005 MIU/L-ACNC: 5.99 UIU/ML
WBC # BLD AUTO: 8.89 K/UL

## 2018-12-13 PROCEDURE — 86140 C-REACTIVE PROTEIN: CPT

## 2018-12-13 PROCEDURE — 84439 ASSAY OF FREE THYROXINE: CPT

## 2018-12-13 PROCEDURE — 80053 COMPREHEN METABOLIC PANEL: CPT

## 2018-12-13 PROCEDURE — 85651 RBC SED RATE NONAUTOMATED: CPT | Mod: PO

## 2018-12-13 PROCEDURE — 36415 COLL VENOUS BLD VENIPUNCTURE: CPT | Mod: PO

## 2018-12-13 PROCEDURE — 84443 ASSAY THYROID STIM HORMONE: CPT

## 2018-12-13 PROCEDURE — 85025 COMPLETE CBC W/AUTO DIFF WBC: CPT

## 2018-12-18 RX ORDER — HYDROCODONE BITARTRATE AND ACETAMINOPHEN 10; 325 MG/1; MG/1
1 TABLET ORAL EVERY 8 HOURS PRN
Qty: 90 TABLET | Refills: 0 | OUTPATIENT
Start: 2018-12-18

## 2018-12-18 NOTE — TELEPHONE ENCOUNTER
----- Message from Winifred Mcfadden sent at 12/18/2018  2:33 PM CST -----  Contact: [phone]  Patient requesting a refill on NORCO.    Patient will be using   [facility] - [facility] - [place], [place] - [address]  [address]  [facility] [address]  Phone: [phone] Fax: [phone]    Please call patient at [phone]. Thanks!

## 2018-12-18 NOTE — TELEPHONE ENCOUNTER
Spoke with patient she states message sent to wrong provider suppose to be sent to . Please give patient a call

## 2018-12-19 RX ORDER — HYDROCODONE BITARTRATE AND ACETAMINOPHEN 10; 325 MG/1; MG/1
1 TABLET ORAL EVERY 8 HOURS PRN
Qty: 90 TABLET | Refills: 0 | Status: CANCELLED | OUTPATIENT
Start: 2018-12-19

## 2018-12-19 NOTE — TELEPHONE ENCOUNTER
Pt requesting refill, but has appt 12/21/2018. Pt states she is out of meds and needs refill prior to her scheduled appt.   Pt last refilled 11/19/2018, last seen 6/12/2018, next appt 12/21/2018.

## 2018-12-21 ENCOUNTER — TELEPHONE (OUTPATIENT)
Dept: RHEUMATOLOGY | Facility: CLINIC | Age: 58
End: 2018-12-21

## 2018-12-21 ENCOUNTER — OFFICE VISIT (OUTPATIENT)
Dept: RHEUMATOLOGY | Facility: CLINIC | Age: 58
End: 2018-12-21
Payer: MEDICARE

## 2018-12-21 ENCOUNTER — TELEPHONE (OUTPATIENT)
Dept: PHARMACY | Facility: CLINIC | Age: 58
End: 2018-12-21

## 2018-12-21 VITALS
HEIGHT: 59 IN | HEART RATE: 87 BPM | BODY MASS INDEX: 23.16 KG/M2 | DIASTOLIC BLOOD PRESSURE: 98 MMHG | WEIGHT: 114.88 LBS | SYSTOLIC BLOOD PRESSURE: 144 MMHG

## 2018-12-21 DIAGNOSIS — F32.A DEPRESSION, UNSPECIFIED DEPRESSION TYPE: ICD-10-CM

## 2018-12-21 DIAGNOSIS — M79.7 FIBROMYALGIA: ICD-10-CM

## 2018-12-21 DIAGNOSIS — I73.00 RAYNAUD'S DISEASE WITHOUT GANGRENE: ICD-10-CM

## 2018-12-21 DIAGNOSIS — R63.4 WEIGHT LOSS: ICD-10-CM

## 2018-12-21 DIAGNOSIS — R52 PAIN MANAGEMENT: Primary | ICD-10-CM

## 2018-12-21 DIAGNOSIS — R79.9 ABNORMAL FINDING OF BLOOD CHEMISTRY: ICD-10-CM

## 2018-12-21 DIAGNOSIS — F41.9 ANXIETY DISORDER, UNSPECIFIED TYPE: ICD-10-CM

## 2018-12-21 DIAGNOSIS — E06.9 THYROIDITIS: ICD-10-CM

## 2018-12-21 DIAGNOSIS — L40.50 PSORIATIC ARTHRITIS: ICD-10-CM

## 2018-12-21 DIAGNOSIS — L40.8 PSORIASIS WITH PUSTULES: ICD-10-CM

## 2018-12-21 PROCEDURE — 99999 PR PBB SHADOW E&M-EST. PATIENT-LVL III: CPT | Mod: PBBFAC,,, | Performed by: INTERNAL MEDICINE

## 2018-12-21 PROCEDURE — 99213 OFFICE O/P EST LOW 20 MIN: CPT | Mod: PBBFAC,PO | Performed by: INTERNAL MEDICINE

## 2018-12-21 PROCEDURE — 99215 OFFICE O/P EST HI 40 MIN: CPT | Mod: S$PBB,,, | Performed by: INTERNAL MEDICINE

## 2018-12-21 RX ORDER — PAROXETINE 10 MG/1
10 TABLET, FILM COATED ORAL EVERY MORNING
Qty: 30 TABLET | Refills: 11 | Status: SHIPPED | OUTPATIENT
Start: 2018-12-21 | End: 2019-05-09 | Stop reason: SDUPTHER

## 2018-12-21 RX ORDER — QUETIAPINE FUMARATE 50 MG/1
150 TABLET, FILM COATED ORAL NIGHTLY
Qty: 90 TABLET | Refills: 6 | Status: SHIPPED | OUTPATIENT
Start: 2018-12-21 | End: 2019-05-22 | Stop reason: SDUPTHER

## 2018-12-21 RX ORDER — GABAPENTIN 300 MG/1
300 CAPSULE ORAL NIGHTLY
Qty: 30 CAPSULE | Refills: 6 | Status: SHIPPED | OUTPATIENT
Start: 2018-12-21 | End: 2019-05-22 | Stop reason: SDUPTHER

## 2018-12-21 RX ORDER — HYDROCODONE BITARTRATE AND ACETAMINOPHEN 10; 325 MG/1; MG/1
1 TABLET ORAL EVERY 8 HOURS PRN
Qty: 90 TABLET | Refills: 0 | Status: SHIPPED | OUTPATIENT
Start: 2019-01-20 | End: 2018-12-21 | Stop reason: SDUPTHER

## 2018-12-21 RX ORDER — BUTALBITAL, ACETAMINOPHEN AND CAFFEINE 50; 325; 40 MG/1; MG/1; MG/1
TABLET ORAL
Qty: 100 TABLET | Refills: 3 | Status: SHIPPED | OUTPATIENT
Start: 2018-12-21 | End: 2019-04-04 | Stop reason: SDUPTHER

## 2018-12-21 RX ORDER — ERGOCALCIFEROL 1.25 MG/1
50000 CAPSULE ORAL
Qty: 4 CAPSULE | Refills: 6 | Status: SHIPPED | OUTPATIENT
Start: 2018-12-21 | End: 2019-05-22 | Stop reason: SDUPTHER

## 2018-12-21 RX ORDER — AZITHROMYCIN 250 MG/1
TABLET, FILM COATED ORAL
Qty: 6 TABLET | Refills: 0 | Status: SHIPPED | OUTPATIENT
Start: 2018-12-21 | End: 2019-03-11

## 2018-12-21 RX ORDER — AZITHROMYCIN 250 MG/1
TABLET, FILM COATED ORAL
Qty: 6 TABLET | Refills: 0 | Status: SHIPPED | OUTPATIENT
Start: 2018-12-21 | End: 2018-12-21 | Stop reason: SDUPTHER

## 2018-12-21 RX ORDER — PREDNISONE 5 MG/1
10 TABLET ORAL DAILY PRN
Qty: 60 TABLET | Refills: 6 | Status: SHIPPED | OUTPATIENT
Start: 2018-12-21 | End: 2019-05-22 | Stop reason: SDUPTHER

## 2018-12-21 RX ORDER — HYDROCODONE BITARTRATE AND ACETAMINOPHEN 10; 325 MG/1; MG/1
1 TABLET ORAL EVERY 8 HOURS PRN
Qty: 90 TABLET | Refills: 0 | Status: SHIPPED | OUTPATIENT
Start: 2019-02-19 | End: 2019-03-18 | Stop reason: SDUPTHER

## 2018-12-21 RX ORDER — LEVOTHYROXINE SODIUM 50 UG/1
50 TABLET ORAL
Qty: 30 TABLET | Refills: 6 | Status: SHIPPED | OUTPATIENT
Start: 2018-12-21 | End: 2019-05-22 | Stop reason: SDUPTHER

## 2018-12-21 RX ORDER — MEGESTROL ACETATE 40 MG/1
40 TABLET ORAL 2 TIMES DAILY
Qty: 60 TABLET | Refills: 11 | Status: SHIPPED | OUTPATIENT
Start: 2018-12-21 | End: 2019-12-20

## 2018-12-21 RX ORDER — HYDROCODONE BITARTRATE AND ACETAMINOPHEN 10; 325 MG/1; MG/1
1 TABLET ORAL EVERY 8 HOURS PRN
Qty: 90 TABLET | Refills: 0 | Status: SHIPPED | OUTPATIENT
Start: 2018-12-21 | End: 2018-12-21 | Stop reason: SDUPTHER

## 2018-12-21 RX ORDER — AMLODIPINE BESYLATE 2.5 MG/1
2.5 TABLET ORAL DAILY
Qty: 30 TABLET | Refills: 11 | Status: SHIPPED | OUTPATIENT
Start: 2018-12-21 | End: 2019-03-11

## 2018-12-21 NOTE — PROGRESS NOTES
Subjective:       Patient ID: Barbara Mims is a 58 y.o. female.    Chief Complaint: Follow-up (Psoriatic arthritis)   :Psoriatic arthritis    She noticed raynauds in her hands, nailbed have  Psoriatic lesions, she is having a fibromyalgia flare. The pt has not had any symptoms of fever, chills, shortness of breath, and change in activity. Noted is am gelling lasting longer than 60 min , there is still some gelling with inactivity. There is  some swelling in the knees but not warm to the touch and enthesis pain. Overall disease state is worsening without improvement .        Review of Systems   Constitutional: Positive for activity change and fatigue. Negative for appetite change and unexpected weight change.   HENT: Negative for dental problem, ear discharge, ear pain, facial swelling, mouth sores, nosebleeds, postnasal drip, rhinorrhea, sinus pressure, sneezing, tinnitus and voice change.    Eyes: Negative for photophobia, pain, discharge, redness and itching.   Respiratory: Negative for apnea, chest tightness, shortness of breath and wheezing.    Cardiovascular: Positive for leg swelling. Negative for palpitations.   Gastrointestinal: Negative for abdominal distention, constipation and diarrhea.   Endocrine: Negative for cold intolerance, heat intolerance, polydipsia and polyuria.   Genitourinary: Negative for decreased urine volume, difficulty urinating, flank pain, frequency, hematuria and urgency.   Musculoskeletal: Positive for arthralgias, back pain, gait problem, joint swelling, myalgias, neck pain and neck stiffness.   Skin: Positive for rash. Negative for pallor and wound.        Nailbed psoriatic   Allergic/Immunologic: Negative for immunocompromised state.   Neurological: Negative for dizziness and tremors.   Hematological: Negative for adenopathy. Does not bruise/bleed easily.   Psychiatric/Behavioral: Negative for sleep disturbance. The patient is not nervous/anxious.          Objective:     BP  "(!) 144/98 (BP Location: Right arm, Patient Position: Sitting, BP Method: Medium (Automatic))   Pulse 87   Ht 4' 11" (1.499 m)   Wt 52.1 kg (114 lb 13.8 oz)   BMI 23.20 kg/m²      Physical Exam   Nursing note and vitals reviewed.  Constitutional: She is oriented to person, place, and time. No distress.   HENT:   Head: Normocephalic and atraumatic.   Mouth/Throat: Oropharynx is clear and moist.   Eyes: EOM are normal. Pupils are equal, round, and reactive to light.   Neck: Neck supple. No thyromegaly present.   Cardiovascular: Normal rate, regular rhythm and normal heart sounds.  Exam reveals no gallop and no friction rub.    No murmur heard.  Pulmonary/Chest: Breath sounds normal. She has no wheezes. She has no rales. She exhibits no tenderness.   Abdominal: There is no tenderness. There is no rebound and no guarding.       Right Side Rheumatological Exam     Examination finds the elbow normal.    The patient is tender to palpation of the shoulder, wrist, knee, 1st PIP, 1st MCP, 2nd PIP, 2nd MCP, 3rd PIP, 3rd MCP, 4th PIP, 4th MCP, 5th PIP and 5th MCP    She has swelling of the 1st PIP, 1st MCP, 2nd PIP, 2nd MCP, 3rd PIP, 3rd MCP, 4th PIP, 4th MCP, 5th PIP and 5th MCP    Shoulder Exam   Tenderness Location: no tenderness    Range of Motion   Active abduction: abnormal   Adduction: abnormal  Sensation: normal    Knee Exam   Patellofemoral Crepitus: positive  Effusion: negative  Sensation: normal    Hip Exam   Tenderness Location: posterior  Sensation: normal    Elbow/Wrist Exam   Tenderness Location: no tenderness  Sensation: normal    Left Side Rheumatological Exam     Examination finds the elbow normal.    The patient is tender to palpation of the shoulder, wrist, knee, 1st PIP, 1st MCP, 2nd PIP, 2nd MCP, 3rd PIP, 3rd MCP, 4th PIP, 4th MCP, 5th PIP and 5th MCP.    She has swelling of the 1st PIP, 1st MCP, 2nd PIP, 2nd MCP, 3rd PIP, 3rd MCP, 4th PIP, 4th MCP, 5th PIP and 5th MCP    Shoulder Exam   Tenderness " Location: no tenderness    Range of Motion   Active abduction: abnormal   Sensation: normal    Knee Exam     Patellofemoral Crepitus: positive  Effusion: negative  Sensation: normal    Hip Exam   Tenderness Location: posterior  Sensation: normal    Elbow/Wrist Exam   Sensation: normal      Back/Neck Exam   Neck Range of Motion   Flexion: Severely limited  Extension: Severely limited    Comments:   Incision clear    Lymphadenopathy:     She has no cervical adenopathy.   Neurological: She is alert and oriented to person, place, and time. Gait normal.   Skin: No rash noted. No erythema. No pallor.     Psychiatric: Mood and affect normal.   Musculoskeletal: She exhibits edema, tenderness and deformity.           Results for orders placed or performed in visit on 12/13/18   Comprehensive metabolic panel   Result Value Ref Range    Sodium 142 136 - 145 mmol/L    Potassium 4.7 3.5 - 5.1 mmol/L    Chloride 105 95 - 110 mmol/L    CO2 26 23 - 29 mmol/L    Glucose 97 70 - 110 mg/dL    BUN, Bld 12 6 - 20 mg/dL    Creatinine 0.8 0.5 - 1.4 mg/dL    Calcium 9.4 8.7 - 10.5 mg/dL    Total Protein 7.7 6.0 - 8.4 g/dL    Albumin 4.3 3.5 - 5.2 g/dL    Total Bilirubin 0.2 0.1 - 1.0 mg/dL    Alkaline Phosphatase 104 55 - 135 U/L    AST 19 10 - 40 U/L    ALT 15 10 - 44 U/L    Anion Gap 11 8 - 16 mmol/L    eGFR if African American >60.0 >60 mL/min/1.73 m^2    eGFR if non African American >60.0 >60 mL/min/1.73 m^2   CBC auto differential   Result Value Ref Range    WBC 8.89 3.90 - 12.70 K/uL    RBC 3.96 (L) 4.00 - 5.40 M/uL    Hemoglobin 12.8 12.0 - 16.0 g/dL    Hematocrit 39.5 37.0 - 48.5 %     (H) 82 - 98 fL    MCH 32.3 (H) 27.0 - 31.0 pg    MCHC 32.4 32.0 - 36.0 g/dL    RDW 12.0 11.5 - 14.5 %    Platelets 310 150 - 350 K/uL    MPV 9.9 9.2 - 12.9 fL    Immature Granulocytes 0.3 0.0 - 0.5 %    Gran # (ANC) 4.6 1.8 - 7.7 K/uL    Immature Grans (Abs) 0.03 0.00 - 0.04 K/uL    Lymph # 3.2 1.0 - 4.8 K/uL    Mono # 0.6 0.3 - 1.0 K/uL     Eos # 0.3 0.0 - 0.5 K/uL    Baso # 0.11 0.00 - 0.20 K/uL    nRBC 0 0 /100 WBC    Gran% 51.4 38.0 - 73.0 %    Lymph% 36.4 18.0 - 48.0 %    Mono% 7.1 4.0 - 15.0 %    Eosinophil% 3.6 0.0 - 8.0 %    Basophil% 1.2 0.0 - 1.9 %    Differential Method Automated    C-reactive protein   Result Value Ref Range    CRP 3.4 0.0 - 8.2 mg/L   Sedimentation rate   Result Value Ref Range    Sed Rate 1 0 - 20 mm/Hr   TSH   Result Value Ref Range    TSH 5.993 (H) 0.400 - 4.000 uIU/mL   T4, free   Result Value Ref Range    Free T4 0.80 0.71 - 1.51 ng/dL           Assessment:       Encounter Diagnoses   Name Primary?    Pain management Yes    Psoriasis with pustules     Psoriatic arthritis     Thyroiditis     Weight loss     Depression, unspecified depression type     Anxiety disorder, unspecified type     Fibromyalgia     Raynaud's disease without gangrene     Abnormal finding of blood chemistry           Plan:     Pain management  -     Comprehensive metabolic panel; Future; Expected date: 12/21/2018  -     CBC auto differential; Future; Expected date: 12/21/2018  -     C-reactive protein; Future; Expected date: 12/21/2018  -     Sedimentation rate; Future; Expected date: 12/21/2018  -     TSH; Future; Expected date: 12/21/2018  -     T4, free; Future; Expected date: 12/21/2018  -     T3, free; Future; Expected date: 12/21/2018  -     Lipid panel; Future; Expected date: 03/21/2019  -     Hemoglobin A1c; Future; Expected date: 03/21/2019    Psoriasis with pustules  -     predniSONE (DELTASONE) 5 MG tablet; Take 2 tablets (10 mg total) by mouth daily as needed.  Dispense: 60 tablet; Refill: 6  -     megestrol (MEGACE) 40 MG Tab; Take 1 tablet (40 mg total) by mouth 2 (two) times daily appetite.  Dispense: 60 tablet; Refill: 11  -     ergocalciferol (ERGOCALCIFEROL) 50,000 unit Cap; Take 1 capsule (50,000 Units total) by mouth every 7 days.  Dispense: 4 capsule; Refill: 6  -     levothyroxine (SYNTHROID) 50 MCG tablet; Take 1  tablet (50 mcg total) by mouth before breakfast.  Dispense: 30 tablet; Refill: 6  -     paroxetine (PAXIL) 10 MG tablet; Take 1 tablet (10 mg total) by mouth every morning.  Dispense: 30 tablet; Refill: 11  -     butalbital-acetaminophen-caffeine -40 mg (FIORICET, ESGIC) -40 mg per tablet; Take 1 tablet by mouth every 6 (six) hours as needed for Pain or Headaches.  Dispense: 100 tablet; Refill: 3  -     gabapentin (NEURONTIN) 300 MG capsule; Take 1 capsule (300 mg total) by mouth nightly.  Dispense: 30 capsule; Refill: 6  -     QUEtiapine (SEROQUEL) 50 MG tablet; Take 3 tablets (150 mg total) by mouth every evening.  Dispense: 90 tablet; Refill: 6  -     apremilast (OTEZLA) 30 mg Tab; Take 1 tablet (30 mg total) by mouth 2 (two) times daily.  Dispense: 60 tablet; Refill: 12  -     Comprehensive metabolic panel; Future; Expected date: 12/21/2018  -     CBC auto differential; Future; Expected date: 12/21/2018  -     C-reactive protein; Future; Expected date: 12/21/2018  -     Sedimentation rate; Future; Expected date: 12/21/2018  -     TSH; Future; Expected date: 12/21/2018  -     T4, free; Future; Expected date: 12/21/2018  -     T3, free; Future; Expected date: 12/21/2018  -     Lipid panel; Future; Expected date: 03/21/2019  -     Hemoglobin A1c; Future; Expected date: 03/21/2019    Psoriatic arthritis  -     predniSONE (DELTASONE) 5 MG tablet; Take 2 tablets (10 mg total) by mouth daily as needed.  Dispense: 60 tablet; Refill: 6  -     megestrol (MEGACE) 40 MG Tab; Take 1 tablet (40 mg total) by mouth 2 (two) times daily appetite.  Dispense: 60 tablet; Refill: 11  -     ergocalciferol (ERGOCALCIFEROL) 50,000 unit Cap; Take 1 capsule (50,000 Units total) by mouth every 7 days.  Dispense: 4 capsule; Refill: 6  -     levothyroxine (SYNTHROID) 50 MCG tablet; Take 1 tablet (50 mcg total) by mouth before breakfast.  Dispense: 30 tablet; Refill: 6  -     paroxetine (PAXIL) 10 MG tablet; Take 1 tablet (10 mg  total) by mouth every morning.  Dispense: 30 tablet; Refill: 11  -     butalbital-acetaminophen-caffeine -40 mg (FIORICET, ESGIC) -40 mg per tablet; Take 1 tablet by mouth every 6 (six) hours as needed for Pain or Headaches.  Dispense: 100 tablet; Refill: 3  -     gabapentin (NEURONTIN) 300 MG capsule; Take 1 capsule (300 mg total) by mouth nightly.  Dispense: 30 capsule; Refill: 6  -     QUEtiapine (SEROQUEL) 50 MG tablet; Take 3 tablets (150 mg total) by mouth every evening.  Dispense: 90 tablet; Refill: 6  -     apremilast (OTEZLA) 30 mg Tab; Take 1 tablet (30 mg total) by mouth 2 (two) times daily.  Dispense: 60 tablet; Refill: 12  -     Comprehensive metabolic panel; Future; Expected date: 12/21/2018  -     CBC auto differential; Future; Expected date: 12/21/2018  -     C-reactive protein; Future; Expected date: 12/21/2018  -     Sedimentation rate; Future; Expected date: 12/21/2018  -     TSH; Future; Expected date: 12/21/2018  -     T4, free; Future; Expected date: 12/21/2018  -     T3, free; Future; Expected date: 12/21/2018  -     Lipid panel; Future; Expected date: 03/21/2019  -     Hemoglobin A1c; Future; Expected date: 03/21/2019    Thyroiditis  -     predniSONE (DELTASONE) 5 MG tablet; Take 2 tablets (10 mg total) by mouth daily as needed.  Dispense: 60 tablet; Refill: 6  -     megestrol (MEGACE) 40 MG Tab; Take 1 tablet (40 mg total) by mouth 2 (two) times daily appetite.  Dispense: 60 tablet; Refill: 11  -     ergocalciferol (ERGOCALCIFEROL) 50,000 unit Cap; Take 1 capsule (50,000 Units total) by mouth every 7 days.  Dispense: 4 capsule; Refill: 6  -     levothyroxine (SYNTHROID) 50 MCG tablet; Take 1 tablet (50 mcg total) by mouth before breakfast.  Dispense: 30 tablet; Refill: 6  -     paroxetine (PAXIL) 10 MG tablet; Take 1 tablet (10 mg total) by mouth every morning.  Dispense: 30 tablet; Refill: 11  -     butalbital-acetaminophen-caffeine -40 mg (FIORICET, ESGIC) -40 mg  per tablet; Take 1 tablet by mouth every 6 (six) hours as needed for Pain or Headaches.  Dispense: 100 tablet; Refill: 3  -     gabapentin (NEURONTIN) 300 MG capsule; Take 1 capsule (300 mg total) by mouth nightly.  Dispense: 30 capsule; Refill: 6  -     QUEtiapine (SEROQUEL) 50 MG tablet; Take 3 tablets (150 mg total) by mouth every evening.  Dispense: 90 tablet; Refill: 6  -     Comprehensive metabolic panel; Future; Expected date: 12/21/2018  -     CBC auto differential; Future; Expected date: 12/21/2018  -     C-reactive protein; Future; Expected date: 12/21/2018  -     Sedimentation rate; Future; Expected date: 12/21/2018  -     TSH; Future; Expected date: 12/21/2018  -     T4, free; Future; Expected date: 12/21/2018  -     T3, free; Future; Expected date: 12/21/2018  -     Lipid panel; Future; Expected date: 03/21/2019  -     Hemoglobin A1c; Future; Expected date: 03/21/2019    Weight loss  -     predniSONE (DELTASONE) 5 MG tablet; Take 2 tablets (10 mg total) by mouth daily as needed.  Dispense: 60 tablet; Refill: 6  -     megestrol (MEGACE) 40 MG Tab; Take 1 tablet (40 mg total) by mouth 2 (two) times daily appetite.  Dispense: 60 tablet; Refill: 11  -     ergocalciferol (ERGOCALCIFEROL) 50,000 unit Cap; Take 1 capsule (50,000 Units total) by mouth every 7 days.  Dispense: 4 capsule; Refill: 6  -     levothyroxine (SYNTHROID) 50 MCG tablet; Take 1 tablet (50 mcg total) by mouth before breakfast.  Dispense: 30 tablet; Refill: 6  -     paroxetine (PAXIL) 10 MG tablet; Take 1 tablet (10 mg total) by mouth every morning.  Dispense: 30 tablet; Refill: 11  -     butalbital-acetaminophen-caffeine -40 mg (FIORICET, ESGIC) -40 mg per tablet; Take 1 tablet by mouth every 6 (six) hours as needed for Pain or Headaches.  Dispense: 100 tablet; Refill: 3  -     gabapentin (NEURONTIN) 300 MG capsule; Take 1 capsule (300 mg total) by mouth nightly.  Dispense: 30 capsule; Refill: 6  -     Comprehensive metabolic  panel; Future; Expected date: 12/21/2018  -     CBC auto differential; Future; Expected date: 12/21/2018  -     C-reactive protein; Future; Expected date: 12/21/2018  -     Sedimentation rate; Future; Expected date: 12/21/2018  -     TSH; Future; Expected date: 12/21/2018  -     T4, free; Future; Expected date: 12/21/2018  -     T3, free; Future; Expected date: 12/21/2018  -     Lipid panel; Future; Expected date: 03/21/2019  -     Hemoglobin A1c; Future; Expected date: 03/21/2019    Depression, unspecified depression type  -     predniSONE (DELTASONE) 5 MG tablet; Take 2 tablets (10 mg total) by mouth daily as needed.  Dispense: 60 tablet; Refill: 6  -     megestrol (MEGACE) 40 MG Tab; Take 1 tablet (40 mg total) by mouth 2 (two) times daily appetite.  Dispense: 60 tablet; Refill: 11  -     ergocalciferol (ERGOCALCIFEROL) 50,000 unit Cap; Take 1 capsule (50,000 Units total) by mouth every 7 days.  Dispense: 4 capsule; Refill: 6  -     levothyroxine (SYNTHROID) 50 MCG tablet; Take 1 tablet (50 mcg total) by mouth before breakfast.  Dispense: 30 tablet; Refill: 6  -     paroxetine (PAXIL) 10 MG tablet; Take 1 tablet (10 mg total) by mouth every morning.  Dispense: 30 tablet; Refill: 11  -     butalbital-acetaminophen-caffeine -40 mg (FIORICET, ESGIC) -40 mg per tablet; Take 1 tablet by mouth every 6 (six) hours as needed for Pain or Headaches.  Dispense: 100 tablet; Refill: 3  -     gabapentin (NEURONTIN) 300 MG capsule; Take 1 capsule (300 mg total) by mouth nightly.  Dispense: 30 capsule; Refill: 6  -     QUEtiapine (SEROQUEL) 50 MG tablet; Take 3 tablets (150 mg total) by mouth every evening.  Dispense: 90 tablet; Refill: 6  -     Comprehensive metabolic panel; Future; Expected date: 12/21/2018  -     CBC auto differential; Future; Expected date: 12/21/2018  -     C-reactive protein; Future; Expected date: 12/21/2018  -     Sedimentation rate; Future; Expected date: 12/21/2018  -     TSH; Future;  Expected date: 12/21/2018  -     T4, free; Future; Expected date: 12/21/2018  -     T3, free; Future; Expected date: 12/21/2018  -     Lipid panel; Future; Expected date: 03/21/2019  -     Hemoglobin A1c; Future; Expected date: 03/21/2019    Anxiety disorder, unspecified type  -     predniSONE (DELTASONE) 5 MG tablet; Take 2 tablets (10 mg total) by mouth daily as needed.  Dispense: 60 tablet; Refill: 6  -     megestrol (MEGACE) 40 MG Tab; Take 1 tablet (40 mg total) by mouth 2 (two) times daily appetite.  Dispense: 60 tablet; Refill: 11  -     ergocalciferol (ERGOCALCIFEROL) 50,000 unit Cap; Take 1 capsule (50,000 Units total) by mouth every 7 days.  Dispense: 4 capsule; Refill: 6  -     levothyroxine (SYNTHROID) 50 MCG tablet; Take 1 tablet (50 mcg total) by mouth before breakfast.  Dispense: 30 tablet; Refill: 6  -     paroxetine (PAXIL) 10 MG tablet; Take 1 tablet (10 mg total) by mouth every morning.  Dispense: 30 tablet; Refill: 11  -     butalbital-acetaminophen-caffeine -40 mg (FIORICET, ESGIC) -40 mg per tablet; Take 1 tablet by mouth every 6 (six) hours as needed for Pain or Headaches.  Dispense: 100 tablet; Refill: 3  -     gabapentin (NEURONTIN) 300 MG capsule; Take 1 capsule (300 mg total) by mouth nightly.  Dispense: 30 capsule; Refill: 6  -     QUEtiapine (SEROQUEL) 50 MG tablet; Take 3 tablets (150 mg total) by mouth every evening.  Dispense: 90 tablet; Refill: 6  -     Comprehensive metabolic panel; Future; Expected date: 12/21/2018  -     CBC auto differential; Future; Expected date: 12/21/2018  -     C-reactive protein; Future; Expected date: 12/21/2018  -     Sedimentation rate; Future; Expected date: 12/21/2018  -     TSH; Future; Expected date: 12/21/2018  -     T4, free; Future; Expected date: 12/21/2018  -     T3, free; Future; Expected date: 12/21/2018  -     Lipid panel; Future; Expected date: 03/21/2019  -     Hemoglobin A1c; Future; Expected date: 03/21/2019    Fibromyalgia  -      predniSONE (DELTASONE) 5 MG tablet; Take 2 tablets (10 mg total) by mouth daily as needed.  Dispense: 60 tablet; Refill: 6  -     megestrol (MEGACE) 40 MG Tab; Take 1 tablet (40 mg total) by mouth 2 (two) times daily appetite.  Dispense: 60 tablet; Refill: 11  -     ergocalciferol (ERGOCALCIFEROL) 50,000 unit Cap; Take 1 capsule (50,000 Units total) by mouth every 7 days.  Dispense: 4 capsule; Refill: 6  -     levothyroxine (SYNTHROID) 50 MCG tablet; Take 1 tablet (50 mcg total) by mouth before breakfast.  Dispense: 30 tablet; Refill: 6  -     paroxetine (PAXIL) 10 MG tablet; Take 1 tablet (10 mg total) by mouth every morning.  Dispense: 30 tablet; Refill: 11  -     butalbital-acetaminophen-caffeine -40 mg (FIORICET, ESGIC) -40 mg per tablet; Take 1 tablet by mouth every 6 (six) hours as needed for Pain or Headaches.  Dispense: 100 tablet; Refill: 3  -     gabapentin (NEURONTIN) 300 MG capsule; Take 1 capsule (300 mg total) by mouth nightly.  Dispense: 30 capsule; Refill: 6  -     QUEtiapine (SEROQUEL) 50 MG tablet; Take 3 tablets (150 mg total) by mouth every evening.  Dispense: 90 tablet; Refill: 6  -     Comprehensive metabolic panel; Future; Expected date: 12/21/2018  -     CBC auto differential; Future; Expected date: 12/21/2018  -     C-reactive protein; Future; Expected date: 12/21/2018  -     Sedimentation rate; Future; Expected date: 12/21/2018  -     TSH; Future; Expected date: 12/21/2018  -     T4, free; Future; Expected date: 12/21/2018  -     T3, free; Future; Expected date: 12/21/2018  -     Lipid panel; Future; Expected date: 03/21/2019  -     Hemoglobin A1c; Future; Expected date: 03/21/2019    Raynaud's disease without gangrene  -     predniSONE (DELTASONE) 5 MG tablet; Take 2 tablets (10 mg total) by mouth daily as needed.  Dispense: 60 tablet; Refill: 6  -     megestrol (MEGACE) 40 MG Tab; Take 1 tablet (40 mg total) by mouth 2 (two) times daily appetite.  Dispense: 60 tablet;  Refill: 11  -     ergocalciferol (ERGOCALCIFEROL) 50,000 unit Cap; Take 1 capsule (50,000 Units total) by mouth every 7 days.  Dispense: 4 capsule; Refill: 6  -     levothyroxine (SYNTHROID) 50 MCG tablet; Take 1 tablet (50 mcg total) by mouth before breakfast.  Dispense: 30 tablet; Refill: 6  -     paroxetine (PAXIL) 10 MG tablet; Take 1 tablet (10 mg total) by mouth every morning.  Dispense: 30 tablet; Refill: 11  -     butalbital-acetaminophen-caffeine -40 mg (FIORICET, ESGIC) -40 mg per tablet; Take 1 tablet by mouth every 6 (six) hours as needed for Pain or Headaches.  Dispense: 100 tablet; Refill: 3  -     gabapentin (NEURONTIN) 300 MG capsule; Take 1 capsule (300 mg total) by mouth nightly.  Dispense: 30 capsule; Refill: 6  -     QUEtiapine (SEROQUEL) 50 MG tablet; Take 3 tablets (150 mg total) by mouth every evening.  Dispense: 90 tablet; Refill: 6  -     amLODIPine (NORVASC) 2.5 MG tablet; Take 1 tablet (2.5 mg total) by mouth once daily.  Dispense: 30 tablet; Refill: 11  -     Comprehensive metabolic panel; Future; Expected date: 12/21/2018  -     CBC auto differential; Future; Expected date: 12/21/2018  -     C-reactive protein; Future; Expected date: 12/21/2018  -     Sedimentation rate; Future; Expected date: 12/21/2018  -     TSH; Future; Expected date: 12/21/2018  -     T4, free; Future; Expected date: 12/21/2018  -     T3, free; Future; Expected date: 12/21/2018  -     Lipid panel; Future; Expected date: 03/21/2019  -     Hemoglobin A1c; Future; Expected date: 03/21/2019    Abnormal finding of blood chemistry   -     Hemoglobin A1c; Future; Expected date: 03/21/2019    Other orders  -     Discontinue: HYDROcodone-acetaminophen (NORCO)  mg per tablet; Take 1 tablet by mouth every 8 (eight) hours as needed for Pain.  Dispense: 90 tablet; Refill: 0  -     Discontinue: HYDROcodone-acetaminophen (NORCO)  mg per tablet; Take 1 tablet by mouth every 8 (eight) hours as needed for  Pain.  Dispense: 90 tablet; Refill: 0  -     HYDROcodone-acetaminophen (NORCO)  mg per tablet; Take 1 tablet by mouth every 8 (eight) hours as needed for Pain.  Dispense: 90 tablet; Refill: 0  -     Discontinue: azithromycin (Z-MEHNAZ) 250 MG tablet; Take 2 tablets by mouth on day 1; Take 1 tablet by mouth on days 2-5  Dispense: 6 tablet; Refill: 0  -     azithromycin (Z-MEHNAZ) 250 MG tablet; Take 2 tablets by mouth on day 1; Take 1 tablet by mouth on days 2-5  Dispense: 6 tablet; Refill: 0    discussed

## 2018-12-31 NOTE — TELEPHONE ENCOUNTER
DOCUMENTATION ONLY:  [employer] prior authorization approved   Dates: 12/31/18 to 12/31/19  Co pay: $8.35

## 2019-01-02 ENCOUNTER — PATIENT MESSAGE (OUTPATIENT)
Dept: PHARMACY | Facility: CLINIC | Age: 59
End: 2019-01-02

## 2019-01-07 ENCOUNTER — PATIENT MESSAGE (OUTPATIENT)
Dept: PHARMACY | Facility: CLINIC | Age: 59
End: 2019-01-07

## 2019-01-08 ENCOUNTER — TELEPHONE (OUTPATIENT)
Dept: RHEUMATOLOGY | Facility: CLINIC | Age: 59
End: 2019-01-08

## 2019-01-09 NOTE — TELEPHONE ENCOUNTER
4 th call attempt initial consult Otyenni. Unable to LVM. $8.50 copay ([id number]).  Sending postcard to home address and notified provider of lack of contact.

## 2019-03-11 ENCOUNTER — OFFICE VISIT (OUTPATIENT)
Dept: RHEUMATOLOGY | Facility: CLINIC | Age: 59
End: 2019-03-11
Payer: MEDICARE

## 2019-03-11 VITALS
SYSTOLIC BLOOD PRESSURE: 160 MMHG | WEIGHT: 118.38 LBS | DIASTOLIC BLOOD PRESSURE: 105 MMHG | BODY MASS INDEX: 23.87 KG/M2 | HEIGHT: 59 IN | HEART RATE: 81 BPM

## 2019-03-11 DIAGNOSIS — I10 UNCONTROLLED HYPERTENSION: ICD-10-CM

## 2019-03-11 DIAGNOSIS — Z72.0 TOBACCO USE: ICD-10-CM

## 2019-03-11 DIAGNOSIS — M79.7 FIBROMYALGIA: ICD-10-CM

## 2019-03-11 DIAGNOSIS — K21.9 GASTROESOPHAGEAL REFLUX DISEASE, ESOPHAGITIS PRESENCE NOT SPECIFIED: ICD-10-CM

## 2019-03-11 DIAGNOSIS — F41.9 ANXIETY DISORDER, UNSPECIFIED TYPE: ICD-10-CM

## 2019-03-11 DIAGNOSIS — Z79.52 CURRENT CHRONIC USE OF SYSTEMIC STEROIDS: ICD-10-CM

## 2019-03-11 DIAGNOSIS — L40.50 PSORIATIC ARTHRITIS: Primary | ICD-10-CM

## 2019-03-11 DIAGNOSIS — F32.A DEPRESSION, UNSPECIFIED DEPRESSION TYPE: ICD-10-CM

## 2019-03-11 PROCEDURE — 99999 PR PBB SHADOW E&M-EST. PATIENT-LVL III: ICD-10-PCS | Mod: PBBFAC,,, | Performed by: PHYSICIAN ASSISTANT

## 2019-03-11 PROCEDURE — 99214 PR OFFICE/OUTPT VISIT, EST, LEVL IV, 30-39 MIN: ICD-10-PCS | Mod: S$PBB,,, | Performed by: PHYSICIAN ASSISTANT

## 2019-03-11 PROCEDURE — 99213 OFFICE O/P EST LOW 20 MIN: CPT | Mod: PBBFAC,PO | Performed by: PHYSICIAN ASSISTANT

## 2019-03-11 PROCEDURE — 99214 OFFICE O/P EST MOD 30 MIN: CPT | Mod: S$PBB,,, | Performed by: PHYSICIAN ASSISTANT

## 2019-03-11 PROCEDURE — 99999 PR PBB SHADOW E&M-EST. PATIENT-LVL III: CPT | Mod: PBBFAC,,, | Performed by: PHYSICIAN ASSISTANT

## 2019-03-11 RX ORDER — OMEPRAZOLE 40 MG/1
40 CAPSULE, DELAYED RELEASE ORAL DAILY
Qty: 30 CAPSULE | Refills: 5 | Status: SHIPPED | OUTPATIENT
Start: 2019-03-11 | End: 2019-08-08 | Stop reason: SDUPTHER

## 2019-03-11 RX ORDER — AMLODIPINE BESYLATE 10 MG/1
10 TABLET ORAL DAILY
Qty: 30 TABLET | Refills: 3 | Status: SHIPPED | OUTPATIENT
Start: 2019-03-11 | End: 2019-05-22

## 2019-03-11 NOTE — PROGRESS NOTES
Subjective:       Patient ID: Barbara Mims is a 58 y.o. female.    Chief Complaint: Disease Management    Mrs. Mims is a 58 year old female who presents to clinic for follow up on psoriatic arthritis and pain management. She is a new patient to me. Last visit in December, Otezla was started, but she never got the prescription after the starter pack provided in clinic. Multiple pharmacy message --unable to reach her. She has not been doing well. She complains of pain involving her hands, neck, and shoulders with morning stiffness lasting up to 2 hours. She has psoriatic nail changes, but no skin manifestations. She has chronic neck pain s/p cervical disc surgery with Dr. Kimble 2-3 years ago and was followed by Dr. Ramsey with pain management; however, nerve blocks and ablations were not helpful and she is not interested in any other interventional pain treatment. She was told she may need another neck surgery. BP has been uncontrolled running >150/100s at home for the last month. She increased norvasc 5 mg daily on her own with no improvement. No chest pain or SOB today. She admits to mild sinus allergies. No infectious symptoms. She is smoking 1/2 pack per day and is cutting back. Not interested in smoking cessation program.     Current treatment:  1. Norco 10/325 TID PRN for pain  2. OTC ibuprofen 400 mg daily  3. Prednisone 10 mg daily  4. Gabapentin 300 mg nightly      Review of Systems   Constitutional: Positive for activity change. Negative for appetite change, chills, fatigue and fever.   HENT: Positive for postnasal drip and rhinorrhea.    Eyes: Negative for visual disturbance.   Respiratory: Negative for cough and shortness of breath.    Cardiovascular: Negative for chest pain, palpitations and leg swelling.   Gastrointestinal: Negative for abdominal pain, constipation, diarrhea, nausea and vomiting.   Musculoskeletal: Positive for arthralgias, back pain, joint swelling, myalgias, neck pain and  neck stiffness.   Allergic/Immunologic: Positive for environmental allergies.   Neurological: Negative for dizziness, weakness, light-headedness and headaches.         Objective:     Vitals:    03/11/19 0804   BP: (!) 160/105   Pulse: 81       Past Medical History:   Diagnosis Date    Alopecia     Anemia     Anxiety     Arthritis     Cardiac angina syndrome     Chronic kidney disease     kidney stones    Degenerative disc disease     Depression     Dry eyes     Dry mouth     Hyperlipidemia     Hypertension     Kidney stones     Mitral valve prolapse     Thyroid disease      Past Surgical History:   Procedure Laterality Date    CARDIAC CATHETERIZATION      CERVICAL DISC SURGERY      HYSTERECTOMY      LITHOTRIPSY            Physical Exam   Constitutional: She is well-developed, well-nourished, and in no distress.   Eyes: Right conjunctiva is not injected. Left conjunctiva is not injected. Right eye exhibits normal extraocular motion. Left eye exhibits normal extraocular motion.   Neck: No JVD present. No thyromegaly present.   Cardiovascular: Normal rate and regular rhythm.  Exam reveals no decreased pulses.    Pulmonary/Chest: She has no wheezes. She has no rhonchi. She has no rales.       Right Side Rheumatological Exam     Examination finds the shoulder, elbow, wrist, knee, 1st MCP, 2nd MCP, 3rd MCP, 4th MCP and 5th MCP normal.    The patient is tender to palpation of the 1st PIP, 2nd PIP, 3rd PIP, 4th PIP and 5th PIP    She has swelling of the 1st PIP, 2nd PIP, 3rd PIP, 4th PIP and 5th PIP    Left Side Rheumatological Exam     Examination finds the shoulder, elbow, wrist, knee, 1st MCP, 2nd MCP, 3rd MCP, 4th MCP and 5th MCP normal.    The patient is tender to palpation of the 1st PIP, 2nd PIP, 3rd PIP, 4th PIP and 5th PIP.    She has swelling of the 1st PIP, 2nd PIP, 3rd PIP, 4th PIP and 5th PIP      Back/Neck Exam   Tenderness Right paramedian tenderness of the Lower C-Spine and Upper  T-Spine.Left paramedian tenderness of the Lower C-Spine and Upper T-Spine.    Neck Range of Motion   Flexion: Limited  Extension: Limited  Right Lateral Bend: abnormal  Left Lateral Bend: abnormal  Right Rotation: abnormal  Left Rotation: abnormal  Lymphadenopathy:     She has no cervical adenopathy.   Neurological: Gait normal.   Skin: No rash noted.     +psoriatic nail changes   Psychiatric: Mood and affect normal.       Recent labs:  Component      Latest Ref Rng & Units 12/13/2018   WBC      3.90 - 12.70 K/uL 8.89   RBC      4.00 - 5.40 M/uL 3.96 (L)   Hemoglobin      12.0 - 16.0 g/dL 12.8   Hematocrit      37.0 - 48.5 % 39.5   MCV      82 - 98 fL 100 (H)   MCH      27.0 - 31.0 pg 32.3 (H)   MCHC      32.0 - 36.0 g/dL 32.4   RDW      11.5 - 14.5 % 12.0   Platelets      150 - 350 K/uL 310   MPV      9.2 - 12.9 fL 9.9   Immature Granulocytes      0.0 - 0.5 % 0.3   Gran # (ANC)      1.8 - 7.7 K/uL 4.6   Immature Grans (Abs)      0.00 - 0.04 K/uL 0.03   Lymph #      1.0 - 4.8 K/uL 3.2   Mono #      0.3 - 1.0 K/uL 0.6   Eos #      0.0 - 0.5 K/uL 0.3   Baso #      0.00 - 0.20 K/uL 0.11   nRBC      0 /100 WBC 0   Gran%      38.0 - 73.0 % 51.4   Lymph%      18.0 - 48.0 % 36.4   Mono%      4.0 - 15.0 % 7.1   Eosinophil%      0.0 - 8.0 % 3.6   Basophil%      0.0 - 1.9 % 1.2   Differential Method       Automated   Sodium      136 - 145 mmol/L 142   Potassium      3.5 - 5.1 mmol/L 4.7   Chloride      95 - 110 mmol/L 105   CO2      23 - 29 mmol/L 26   Glucose      70 - 110 mg/dL 97   BUN, Bld      6 - 20 mg/dL 12   Creatinine      0.5 - 1.4 mg/dL 0.8   Calcium      8.7 - 10.5 mg/dL 9.4   Total Protein      6.0 - 8.4 g/dL 7.7   Albumin      3.5 - 5.2 g/dL 4.3   Total Bilirubin      0.1 - 1.0 mg/dL 0.2   Alkaline Phosphatase      55 - 135 U/L 104   AST      10 - 40 U/L 19   ALT      10 - 44 U/L 15   Anion Gap      8 - 16 mmol/L 11   eGFR if African American      >60 mL/min/1.73 m:2 >60.0   eGFR if non       >60  mL/min/1.73 m:2 >60.0   CRP      0.0 - 8.2 mg/L 3.4   Sed Rate      0 - 20 mm/Hr 1   TSH      0.400 - 4.000 uIU/mL 5.993 (H)   Free T4      0.71 - 1.51 ng/dL 0.80     Assessment:       1. Psoriatic arthritis    2. Uncontrolled hypertension    3. Anxiety disorder, unspecified type    4. Depression, unspecified depression type    5. Fibromyalgia    6. Gastroesophageal reflux disease, esophagitis presence not specified    7. Current chronic use of systemic steroids    8. Tobacco use            Plan:       Psoriatic arthritis  -     apremilast (OTEZLA STARTER) 10 mg (4)-20 mg (4)-30 mg (47) DsPk; Follow instructions on package  Dispense: 55 tablet; Refill: 0  -     apremilast (OTEZLA) 30 mg Tab; Take 1 tablet (30 mg total) by mouth 2 (two) times daily.  Dispense: 60 tablet; Refill: 5  -     Hepatitis B core antibody, IgM; Future; Expected date: 03/11/2019  -     Hepatitis C antibody; Future; Expected date: 03/11/2019  -     Quantiferon Gold TB; Future; Expected date: 03/11/2019    Uncontrolled hypertension  -     amLODIPine (NORVASC) 10 MG tablet; Take 1 tablet (10 mg total) by mouth once daily.  Dispense: 30 tablet; Refill: 3    Anxiety disorder, unspecified type    Depression, unspecified depression type    Fibromyalgia    Gastroesophageal reflux disease, esophagitis presence not specified  -     omeprazole (PRILOSEC) 40 MG capsule; Take 1 capsule (40 mg total) by mouth once daily.  Dispense: 30 capsule; Refill: 5    Current chronic use of systemic steroids    Tobacco use        Assessment:  58 year old female with   Psoriatic arthritis, psoriasis  --chronic neck pain s/p cervical surgery  --uncontrolled hypertension    Plan:  1. Restart Otezla--starter pack and maintenance dosing sent to [facility]. Pharmacy note states she is approved through 12/19.  2. Continue prednisone 10 mg daily for now, taper to 5 mg daily when able. Discussed s/e of long term prednisone use.  3. Increase norvasc 10 mg daily. Monitor BP at home and  keep a log. Notify clinic if BP is persistently >140/90.  2 week f/u on the phone if persistently elevated then will add losartan and refer to PCP.  4. Continue paxil/seroquel-mood is stable  5. Continue gabapentin 300 mg nightly/ norco 10/325 TID PRN. Pain contract signed 6/2018. Refill due 3/19.  6. Start OTC anti-histamine for allergies  7. Recommend smoking cessation program, pt declined due to lack of transportation.    Follow up: 3 months with Dr. Rodriguez with fasting labs prior

## 2019-03-12 ENCOUNTER — TELEPHONE (OUTPATIENT)
Dept: PHARMACY | Facility: CLINIC | Age: 59
End: 2019-03-12

## 2019-03-14 NOTE — TELEPHONE ENCOUNTER
DOCUMENTATION ONLY:  Prior authorization for Otezla already on file until 12/31/19    Co-pay: $8.50    Patient Assistance is not required.

## 2019-03-18 ENCOUNTER — TELEPHONE (OUTPATIENT)
Dept: RHEUMATOLOGY | Facility: CLINIC | Age: 59
End: 2019-03-18

## 2019-03-18 DIAGNOSIS — L40.50 PSORIATIC ARTHRITIS: Primary | ICD-10-CM

## 2019-03-18 NOTE — TELEPHONE ENCOUNTER
----- Message from Patrizia Gay PA-C sent at 3/11/2019  8:31 AM CDT -----  Please call and check in on her blood pressure readings from home. Goal is less than 140/90. If still high then we need to add another medication and refer to primary care for close follow up.

## 2019-03-18 NOTE — TELEPHONE ENCOUNTER
Contacted patient regarding elevated BP form last visit. Patient stated BP staying elevated 160/112 or 190/99. Nurse informed patient to contact PCP for appointment regarding elevated BP for possible start of medication. Patient informed office that does not have PCP that Dr Rodriguez always takes care of everything. Nurse advised to establish care with a PCP for evaluation of elevated BP. Patient informed office will try to find a PCP near her home. Nurse informed patient that a message will be sent to Mrs Acharya regarding elevated BP. Patient also inquired about refill on pain medication. Nurse informed patient that a refill request will be sent to Dr Rodriguez. Patient verbalized understanding.

## 2019-03-19 ENCOUNTER — TELEPHONE (OUTPATIENT)
Dept: PHARMACY | Facility: CLINIC | Age: 59
End: 2019-03-19

## 2019-03-19 RX ORDER — HYDROCODONE BITARTRATE AND ACETAMINOPHEN 10; 325 MG/1; MG/1
1 TABLET ORAL EVERY 8 HOURS PRN
Qty: 90 TABLET | Refills: 0 | Status: SHIPPED | OUTPATIENT
Start: 2019-03-19 | End: 2019-04-22 | Stop reason: SDUPTHER

## 2019-03-19 NOTE — TELEPHONE ENCOUNTER
Initial Otezla Starter Pack and Maintenance  Dose consult was completed on 3/19. Otezla Starter Pack will be shipped on 3/25 to arrive at patient's home on 3/26 via [employer]. $8.50 copay. Patient will start Otezla Starter Pack on 3/26. Address confirmed. Confirmed 2 patient identifiers - name and . Therapy Appropriate.        Patient was given Otezla counseling as follows:  Take by mouth:   Day 1: 10 mg in AM  Day 2: 10 mg in AM and 10 mg in PM  Day 3: 10 mg in AM and 20 mg in PM  Day 4: 20 mg in AM and 20 mg in PM  Day 5: 20 mg in AM and 30 mg in PM  Day 6 and thereafter: 30 mg twice daily    - Take with or without food.  - Swallow the tablet whole. Do not crush, split, or chew the tablet.  - Try not to miss any scheduled doses. If you forget, take your dose as soon as you remember, then take the next dose at the regularly scheduled time. Call if you have any questions. Do not take 2 tablets at the same time.     Patient was counseled on the following possible side effects, which include, but are not limited to:  headache, diarrhea, upset stomach, weight loss. Contact provider if allergic reaction, major weight loss, or depression should occur.     DDIs:  Medication list reviewed and no potential DDIs found during initial review.       Patient confirmed understanding. Patient did not have additional questions.   Patient will start Otezla on 3/26. Consultation included: indication; goals of treatment; administration; storage and handling; side effects; how to handle side effects; the importance of compliance; how to handle missed doses; the importance of laboratory monitoring; the importance of keeping all follow up appointments. Patient understands to report any medication changes to [facility] and provider.  All questions answered and addressed to patients satisfaction. I will f/u with patient in 1 week from start, [facility] to contact patient in 3 weeks for refills.     Piper Carrasco, PharmD  [facility]  [phone]

## 2019-03-19 NOTE — TELEPHONE ENCOUNTER
Returned patient call regarding refill request. Nurse informed patient that request has been sent to Dr Rodriguez. Nurse informed that will contact patient once script is sent. Patient verbalized understanding.

## 2019-03-19 NOTE — TELEPHONE ENCOUNTER
----- Message from Hanna Figueroa sent at 3/19/2019  9:58 AM CDT -----  Contact: Patient  Patient is calling to speak with the office again regarding her refill on HYDROcodone-acetaminophen (NORCO)  mg per tablet that still has not been called into her pharmacy.  Please call to discuss, she is currently out of this medication.  Call Back#[phone]  Thanks     [facility] - [place], [place] - [address]  [address]  [place] [address]  Phone: [phone] Fax: [phone]

## 2019-03-20 ENCOUNTER — PATIENT MESSAGE (OUTPATIENT)
Dept: PHARMACY | Facility: CLINIC | Age: 59
End: 2019-03-20

## 2019-03-22 ENCOUNTER — TELEPHONE (OUTPATIENT)
Dept: RHEUMATOLOGY | Facility: CLINIC | Age: 59
End: 2019-03-22

## 2019-03-22 DIAGNOSIS — I10 HYPERTENSION, UNSPECIFIED TYPE: Primary | ICD-10-CM

## 2019-03-22 RX ORDER — LOSARTAN POTASSIUM 25 MG/1
25 TABLET ORAL DAILY
Qty: 30 TABLET | Refills: 1 | Status: SHIPPED | OUTPATIENT
Start: 2019-03-22 | End: 2019-05-22 | Stop reason: SDUPTHER

## 2019-03-22 NOTE — TELEPHONE ENCOUNTER
Attempted to contact patient regarding medication that was sent to pharmacy and inform of a referral placed for PCP. Unable to reach or leave a message

## 2019-03-22 NOTE — TELEPHONE ENCOUNTER
Add losartan 25 mg daily for uncontrolled BP. Monitor BP at home and keep a log. Notify clinic if BP is persistently >140/90.  Please also schedule with Family Practice for follow up--referral placed.

## 2019-04-04 DIAGNOSIS — F41.9 ANXIETY DISORDER, UNSPECIFIED TYPE: ICD-10-CM

## 2019-04-04 DIAGNOSIS — L40.8 PSORIASIS WITH PUSTULES: ICD-10-CM

## 2019-04-04 DIAGNOSIS — R63.4 WEIGHT LOSS: ICD-10-CM

## 2019-04-04 DIAGNOSIS — F32.A DEPRESSION, UNSPECIFIED DEPRESSION TYPE: ICD-10-CM

## 2019-04-04 DIAGNOSIS — I73.00 RAYNAUD'S DISEASE WITHOUT GANGRENE: ICD-10-CM

## 2019-04-04 DIAGNOSIS — E06.9 THYROIDITIS: ICD-10-CM

## 2019-04-04 DIAGNOSIS — M79.7 FIBROMYALGIA: ICD-10-CM

## 2019-04-04 DIAGNOSIS — L40.50 PSORIATIC ARTHRITIS: ICD-10-CM

## 2019-04-04 NOTE — TELEPHONE ENCOUNTER
----- Message from Freddy Cabrera sent at 4/4/2019 12:29 PM CDT -----  Contact: self   Patient need a refill on FIORICET -40 mg 30 day supply please send to [facility], any questions please call back at [phone] (home)      [facility] [place] - [place] - [address]  [address] [address]  Phone: [phone] Fax: [phone]

## 2019-04-08 RX ORDER — BUTALBITAL, ACETAMINOPHEN AND CAFFEINE 50; 325; 40 MG/1; MG/1; MG/1
TABLET ORAL
Qty: 100 TABLET | Refills: 3 | Status: SHIPPED | OUTPATIENT
Start: 2019-04-08 | End: 2019-07-01 | Stop reason: SDUPTHER

## 2019-04-09 ENCOUNTER — PATIENT MESSAGE (OUTPATIENT)
Dept: PHARMACY | Facility: CLINIC | Age: 59
End: 2019-04-09

## 2019-04-15 ENCOUNTER — TELEPHONE (OUTPATIENT)
Dept: RHEUMATOLOGY | Facility: CLINIC | Age: 59
End: 2019-04-15

## 2019-04-15 NOTE — TELEPHONE ENCOUNTER
----- Message from Piper Carrasco PharmD sent at 4/15/2019  2:40 PM CDT -----  Regarding: Otezla side effects  Good afternoon,     I spoke with Ms. Mims this afternoon regarding her Otezla start. She says she started on 3/26 and was taking about 2 weeks but noticed significant muscle cramping/spasms, like charley horse in her legs and under her feet during the day and especially every night, so she couldn't sleep well. She says she stopped taking end of last week and the spasms have stopped. No other changes in medications or diet, she has been staying hydrated. She is wondering if there is something else she could take instead of Otezla or maybe she could be seen in clinic sooner? Please advise.     Thank you,  Piper Carrasco, PharmD  [facility]  [phone]

## 2019-04-22 ENCOUNTER — TELEPHONE (OUTPATIENT)
Dept: RHEUMATOLOGY | Facility: CLINIC | Age: 59
End: 2019-04-22

## 2019-04-22 DIAGNOSIS — L40.50 PSORIATIC ARTHRITIS: ICD-10-CM

## 2019-04-22 RX ORDER — HYDROCODONE BITARTRATE AND ACETAMINOPHEN 10; 325 MG/1; MG/1
1 TABLET ORAL EVERY 8 HOURS PRN
Qty: 90 TABLET | Refills: 0 | Status: SHIPPED | OUTPATIENT
Start: 2019-04-22 | End: 2019-05-20 | Stop reason: SDUPTHER

## 2019-04-22 NOTE — TELEPHONE ENCOUNTER
----- Message from Meg Patino sent at 4/22/2019  8:51 AM CDT -----  Contact: pt  Type:  RX Refill Request    Who Called: pt  Refill or New Rx:refill  RX Name and Strength:norco 10 mg  How is the patient currently taking it? (ex. 1XDay):3XDay  Is this a 30 day or 90 day RX:30  Preferred Pharmacy with phone number:.  [facility] - [place], [place] - [address]  [address] [address]  Phone: [phone] Fax: [phone]  Local or Mail Order:local  Ordering Provider:Dr Rodriguez  Would the patient rather a call back or a response via [facility]? Call back  Best Call Back Number:[phone]  Additional Information: States she had to go without her pain medicine the weekend.

## 2019-04-22 NOTE — TELEPHONE ENCOUNTER
----- Message from Tamiko Tabares sent at 4/22/2019  2:39 PM CDT -----  Contact: Barbara pt  Type:  RX Refill Request    Who Called:  Barbara   Refill or New Rx:  refill  RX Name and Strength:  Norco 10mg  How is the patient currently taking it? (ex. 1XDay):  3x day  Is this a 30 day or 90 day RX:  30  [facility] Pharm[facility] with phone number:    [facility] [place] [address]  [address] [address]  Phone: [phone] Fax: [phone]    Local or Mail Order:  local  Ordering Provider:  Jennifer Bang Call Back Number:  [phone]  Additional Information:  Pls call pt regarding. This rx is not on her chart.

## 2019-04-22 NOTE — TELEPHONE ENCOUNTER
----- Message from Ian Reardon sent at 4/22/2019 11:56 AM CDT -----  Contact: Patient  Type:  RX Refill Request    Who Called:  Patient  Refill or New Rx:  Refill  RX Name and Strength:  HYDROcodone-acetaminophen (NORCO)  mg per tablet  How is the patient currently taking it? (ex. 1XDay):  As ordered  Is this a 30 day or 90 day RX:  30  Preferred Pharmacy with phone number:    [facility] [place] [address]  [address] [address]  Phone: [phone] Fax: [phone]  Local or Mail Order:  Local  Ordering Provider:  Dr. Jennifer Bang Call Back Number:  [phone]  Additional Information: Patient has been trying for the past week to get the above refilled with several faxes from the pharmacy.

## 2019-04-22 NOTE — TELEPHONE ENCOUNTER
----- Message from Teresita Garcia sent at 4/22/2019  4:52 PM CDT -----  Type:  RX Refill Request    Who Called:  Patient  Refill or New Rx:  Refill  RX Name and Strength:  Norco  How is the patient currently taking it? (ex. 1XDay):  3xday  Is this a 30 day or 90 day RX:  90 pills  Preferred Pharmacy with phone number:    [facility] - [place], [place] - [address]  [facility] [address]  Phone: [phone] Fax: [phone]    Local or Mail Order:  local  Ordering Provider:  same  Best Call Back Number:    [facility] - [place], [place] - [address]  [address]  [facility] [address]  Phone: [phone] Fax: [phone]    Additional Information:  Pharmacy states that they faxed for a refill on this 4 times last week. Please call patient when completed.        0=no anxiety, at ease

## 2019-04-22 NOTE — TELEPHONE ENCOUNTER
Incoming call from patient, she states [facility] has been faxing us for a week for her refill. Apologized and advised that we have been through our faxes and had not received anything from them for her. Also advised that today was the first phone request we have received. Pt advises she has had to go without medication all weekend and is in need of her pain medication. Advised the request has been sent to Dr. Rodriguez and she will address when she is available in between clinic patients. She verbalized understanding.

## 2019-04-22 NOTE — TELEPHONE ENCOUNTER
Refill request forwarded to Dr. Rodriguez for [place]. Patient last filled 03/18/19, last seen in office 03/11/2019

## 2019-04-23 NOTE — TELEPHONE ENCOUNTER
Attempted to contact patient regarding medication and blood pressure. Unable to reach or leave a message mail box is full

## 2019-04-23 NOTE — TELEPHONE ENCOUNTER
----- Message from Piper Carrasco PharmD sent at 4/15/2019  2:40 PM CDT -----  Regarding: Otezla side effects  Good afternoon,     I spoke with Ms. Mims this afternoon regarding her Otezla start. She says she started on 3/26 and was taking about 2 weeks but noticed significant muscle cramping/spasms, like charley horse in her legs and under her feet during the day and especially every night, so she couldn't sleep well. She says she stopped taking end of last week and the spasms have stopped. No other changes in medications or diet, she has been staying hydrated. She is wondering if there is something else she could take instead of Otezla or maybe she could be seen in clinic sooner? Please advise.     Thank you,  Piper Carrasco, PharmD  [facility]  [phone]

## 2019-04-23 NOTE — TELEPHONE ENCOUNTER
I would recommend that she stop otzela and keep her follow up visit with Dr. Rodriguez in May to discuss alternative treatments. Please also find out how her blood pressure is running.

## 2019-05-09 DIAGNOSIS — L40.50 PSORIATIC ARTHRITIS: ICD-10-CM

## 2019-05-09 DIAGNOSIS — I73.00 RAYNAUD'S DISEASE WITHOUT GANGRENE: ICD-10-CM

## 2019-05-09 DIAGNOSIS — R63.4 WEIGHT LOSS: ICD-10-CM

## 2019-05-09 DIAGNOSIS — E06.9 THYROIDITIS: ICD-10-CM

## 2019-05-09 DIAGNOSIS — L40.8 PSORIASIS WITH PUSTULES: ICD-10-CM

## 2019-05-09 DIAGNOSIS — F41.9 ANXIETY DISORDER, UNSPECIFIED TYPE: ICD-10-CM

## 2019-05-09 DIAGNOSIS — F32.A DEPRESSION, UNSPECIFIED DEPRESSION TYPE: ICD-10-CM

## 2019-05-09 DIAGNOSIS — M79.7 FIBROMYALGIA: ICD-10-CM

## 2019-05-12 RX ORDER — PAROXETINE 10 MG/1
10 TABLET, FILM COATED ORAL EVERY MORNING
Qty: 30 TABLET | Refills: 11 | Status: SHIPPED | OUTPATIENT
Start: 2019-05-12 | End: 2019-12-20 | Stop reason: SDUPTHER

## 2019-05-20 DIAGNOSIS — L40.50 PSORIATIC ARTHRITIS: ICD-10-CM

## 2019-05-20 NOTE — TELEPHONE ENCOUNTER
----- Message from Francine Montanez sent at 5/20/2019  9:20 AM CDT -----  Contact: Patient  Type:  RX Refill Request    Who Called:  Patient  Refill or New Rx:  refill  RX Name and Strength:  HYDROcodone-acetaminophen (NORCO)  mg per tablet  How is the patient currently taking it? (ex. 1XDay):  3x day  Is this a 30 day or 90 day RX:  30 day  [facility] with phone number:    [facility] [place] [address]  [address] [address]  Phone: [phone] Fax: [phone]    Local or Mail Order:  local  Ordering Provider:  Dr. Jennifer Bang Call Back Number:  [phone] (home)    Additional Information: Patient states that her medication is not due until 5/23/19.

## 2019-05-21 RX ORDER — HYDROCODONE BITARTRATE AND ACETAMINOPHEN 10; 325 MG/1; MG/1
1 TABLET ORAL EVERY 8 HOURS PRN
Qty: 90 TABLET | Refills: 0 | Status: SHIPPED | OUTPATIENT
Start: 2019-05-21 | End: 2019-05-22 | Stop reason: SDUPTHER

## 2019-05-22 ENCOUNTER — OFFICE VISIT (OUTPATIENT)
Dept: RHEUMATOLOGY | Facility: CLINIC | Age: 59
End: 2019-05-22
Payer: MEDICARE

## 2019-05-22 ENCOUNTER — LAB VISIT (OUTPATIENT)
Dept: LAB | Facility: HOSPITAL | Age: 59
End: 2019-05-22
Attending: INTERNAL MEDICINE
Payer: MEDICARE

## 2019-05-22 VITALS
DIASTOLIC BLOOD PRESSURE: 97 MMHG | HEIGHT: 59 IN | WEIGHT: 113.13 LBS | BODY MASS INDEX: 22.8 KG/M2 | SYSTOLIC BLOOD PRESSURE: 143 MMHG | HEART RATE: 82 BPM

## 2019-05-22 DIAGNOSIS — F32.A DEPRESSION, UNSPECIFIED DEPRESSION TYPE: ICD-10-CM

## 2019-05-22 DIAGNOSIS — L40.50 PSORIATIC ARTHRITIS: ICD-10-CM

## 2019-05-22 DIAGNOSIS — R63.4 WEIGHT LOSS: ICD-10-CM

## 2019-05-22 DIAGNOSIS — L40.8 PSORIASIS WITH PUSTULES: ICD-10-CM

## 2019-05-22 DIAGNOSIS — M79.7 FIBROMYALGIA: ICD-10-CM

## 2019-05-22 DIAGNOSIS — F41.9 ANXIETY DISORDER, UNSPECIFIED TYPE: ICD-10-CM

## 2019-05-22 DIAGNOSIS — I73.00 RAYNAUD'S DISEASE WITHOUT GANGRENE: ICD-10-CM

## 2019-05-22 DIAGNOSIS — R52 PAIN MANAGEMENT: ICD-10-CM

## 2019-05-22 DIAGNOSIS — J40 BRONCHITIS: Primary | ICD-10-CM

## 2019-05-22 DIAGNOSIS — I10 HYPERTENSION, UNSPECIFIED TYPE: ICD-10-CM

## 2019-05-22 DIAGNOSIS — E06.9 THYROIDITIS: ICD-10-CM

## 2019-05-22 DIAGNOSIS — R79.9 ABNORMAL FINDING OF BLOOD CHEMISTRY: ICD-10-CM

## 2019-05-22 LAB
ALBUMIN SERPL BCP-MCNC: 4.1 G/DL (ref 3.5–5.2)
ALP SERPL-CCNC: 125 U/L (ref 55–135)
ALT SERPL W/O P-5'-P-CCNC: 19 U/L (ref 10–44)
ANION GAP SERPL CALC-SCNC: 10 MMOL/L (ref 8–16)
AST SERPL-CCNC: 17 U/L (ref 10–40)
BASOPHILS # BLD AUTO: 0.15 K/UL (ref 0–0.2)
BASOPHILS NFR BLD: 1.6 % (ref 0–1.9)
BILIRUB SERPL-MCNC: 0.2 MG/DL (ref 0.1–1)
BUN SERPL-MCNC: 19 MG/DL (ref 6–20)
CALCIUM SERPL-MCNC: 10 MG/DL (ref 8.7–10.5)
CHLORIDE SERPL-SCNC: 109 MMOL/L (ref 95–110)
CHOLEST SERPL-MCNC: 349 MG/DL (ref 120–199)
CHOLEST/HDLC SERPL: 8.5 {RATIO} (ref 2–5)
CO2 SERPL-SCNC: 19 MMOL/L (ref 23–29)
CREAT SERPL-MCNC: 0.8 MG/DL (ref 0.5–1.4)
CRP SERPL-MCNC: 3.5 MG/L (ref 0–8.2)
DIFFERENTIAL METHOD: ABNORMAL
EOSINOPHIL # BLD AUTO: 0.3 K/UL (ref 0–0.5)
EOSINOPHIL NFR BLD: 3 % (ref 0–8)
ERYTHROCYTE [DISTWIDTH] IN BLOOD BY AUTOMATED COUNT: 12.1 % (ref 11.5–14.5)
ERYTHROCYTE [SEDIMENTATION RATE] IN BLOOD BY WESTERGREN METHOD: 14 MM/HR (ref 0–20)
EST. GFR  (AFRICAN AMERICAN): >60 ML/MIN/1.73 M^2
EST. GFR  (NON AFRICAN AMERICAN): >60 ML/MIN/1.73 M^2
ESTIMATED AVG GLUCOSE: 111 MG/DL (ref 68–131)
GLUCOSE SERPL-MCNC: 90 MG/DL (ref 70–110)
HBA1C MFR BLD HPLC: 5.5 % (ref 4–5.6)
HCT VFR BLD AUTO: 41 % (ref 37–48.5)
HDLC SERPL-MCNC: 41 MG/DL (ref 40–75)
HDLC SERPL: 11.7 % (ref 20–50)
HGB BLD-MCNC: 13.6 G/DL (ref 12–16)
IMM GRANULOCYTES # BLD AUTO: 0.07 K/UL (ref 0–0.04)
IMM GRANULOCYTES NFR BLD AUTO: 0.8 % (ref 0–0.5)
LDLC SERPL CALC-MCNC: ABNORMAL MG/DL (ref 63–159)
LYMPHOCYTES # BLD AUTO: 3.4 K/UL (ref 1–4.8)
LYMPHOCYTES NFR BLD: 36.4 % (ref 18–48)
MCH RBC QN AUTO: 33.3 PG (ref 27–31)
MCHC RBC AUTO-ENTMCNC: 33.2 G/DL (ref 32–36)
MCV RBC AUTO: 101 FL (ref 82–98)
MONOCYTES # BLD AUTO: 0.7 K/UL (ref 0.3–1)
MONOCYTES NFR BLD: 7.8 % (ref 4–15)
NEUTROPHILS # BLD AUTO: 4.7 K/UL (ref 1.8–7.7)
NEUTROPHILS NFR BLD: 50.4 % (ref 38–73)
NONHDLC SERPL-MCNC: 308 MG/DL
NRBC BLD-RTO: 0 /100 WBC
PLATELET # BLD AUTO: 555 K/UL (ref 150–350)
PMV BLD AUTO: 8.9 FL (ref 9.2–12.9)
POTASSIUM SERPL-SCNC: 4.3 MMOL/L (ref 3.5–5.1)
PROT SERPL-MCNC: 7.9 G/DL (ref 6–8.4)
RBC # BLD AUTO: 4.08 M/UL (ref 4–5.4)
SODIUM SERPL-SCNC: 138 MMOL/L (ref 136–145)
T3FREE SERPL-MCNC: 2.1 PG/ML (ref 2.3–4.2)
T4 FREE SERPL-MCNC: 0.85 NG/DL (ref 0.71–1.51)
TRIGL SERPL-MCNC: 557 MG/DL (ref 30–150)
TSH SERPL DL<=0.005 MIU/L-ACNC: 1.36 UIU/ML (ref 0.4–4)
WBC # BLD AUTO: 9.27 K/UL (ref 3.9–12.7)

## 2019-05-22 PROCEDURE — 99214 OFFICE O/P EST MOD 30 MIN: CPT | Mod: S$PBB,,, | Performed by: INTERNAL MEDICINE

## 2019-05-22 PROCEDURE — 99213 OFFICE O/P EST LOW 20 MIN: CPT | Mod: PBBFAC,PO | Performed by: INTERNAL MEDICINE

## 2019-05-22 PROCEDURE — 86140 C-REACTIVE PROTEIN: CPT

## 2019-05-22 PROCEDURE — 99999 PR PBB SHADOW E&M-EST. PATIENT-LVL III: CPT | Mod: PBBFAC,,, | Performed by: INTERNAL MEDICINE

## 2019-05-22 PROCEDURE — 80053 COMPREHEN METABOLIC PANEL: CPT

## 2019-05-22 PROCEDURE — 85025 COMPLETE CBC W/AUTO DIFF WBC: CPT

## 2019-05-22 PROCEDURE — 84439 ASSAY OF FREE THYROXINE: CPT

## 2019-05-22 PROCEDURE — 99999 PR PBB SHADOW E&M-EST. PATIENT-LVL III: ICD-10-PCS | Mod: PBBFAC,,, | Performed by: INTERNAL MEDICINE

## 2019-05-22 PROCEDURE — 86803 HEPATITIS C AB TEST: CPT

## 2019-05-22 PROCEDURE — 85651 RBC SED RATE NONAUTOMATED: CPT | Mod: PO

## 2019-05-22 PROCEDURE — 86705 HEP B CORE ANTIBODY IGM: CPT

## 2019-05-22 PROCEDURE — 86480 TB TEST CELL IMMUN MEASURE: CPT

## 2019-05-22 PROCEDURE — 84443 ASSAY THYROID STIM HORMONE: CPT

## 2019-05-22 PROCEDURE — 99214 PR OFFICE/OUTPT VISIT, EST, LEVL IV, 30-39 MIN: ICD-10-PCS | Mod: S$PBB,,, | Performed by: INTERNAL MEDICINE

## 2019-05-22 PROCEDURE — 83036 HEMOGLOBIN GLYCOSYLATED A1C: CPT

## 2019-05-22 PROCEDURE — 80061 LIPID PANEL: CPT

## 2019-05-22 PROCEDURE — 84481 FREE ASSAY (FT-3): CPT

## 2019-05-22 RX ORDER — LOSARTAN POTASSIUM 50 MG/1
50 TABLET ORAL DAILY
Qty: 30 TABLET | Refills: 6 | Status: SHIPPED | OUTPATIENT
Start: 2019-05-22 | End: 2019-08-20

## 2019-05-22 RX ORDER — PREDNISONE 5 MG/1
10 TABLET ORAL DAILY PRN
Qty: 60 TABLET | Refills: 6 | Status: SHIPPED | OUTPATIENT
Start: 2019-05-22 | End: 2019-08-20 | Stop reason: SDUPTHER

## 2019-05-22 RX ORDER — GABAPENTIN 300 MG/1
300 CAPSULE ORAL NIGHTLY
Qty: 30 CAPSULE | Refills: 6 | Status: SHIPPED | OUTPATIENT
Start: 2019-05-22 | End: 2019-08-20 | Stop reason: SDUPTHER

## 2019-05-22 RX ORDER — HYDROCODONE BITARTRATE AND ACETAMINOPHEN 10; 325 MG/1; MG/1
1 TABLET ORAL EVERY 8 HOURS PRN
Qty: 90 TABLET | Refills: 0 | Status: SHIPPED | OUTPATIENT
Start: 2019-06-22 | End: 2019-05-22 | Stop reason: SDUPTHER

## 2019-05-22 RX ORDER — LEVOFLOXACIN 500 MG/1
500 TABLET, FILM COATED ORAL DAILY
Qty: 10 TABLET | Refills: 0 | Status: SHIPPED | OUTPATIENT
Start: 2019-05-22 | End: 2019-06-01

## 2019-05-22 RX ORDER — LEVOTHYROXINE SODIUM 50 UG/1
50 TABLET ORAL
Qty: 30 TABLET | Refills: 6 | Status: SHIPPED | OUTPATIENT
Start: 2019-05-22 | End: 2019-08-20 | Stop reason: SDUPTHER

## 2019-05-22 RX ORDER — ALBUTEROL SULFATE 90 UG/1
2 AEROSOL, METERED RESPIRATORY (INHALATION) EVERY 6 HOURS PRN
Qty: 18 G | Refills: 0 | Status: SHIPPED | OUTPATIENT
Start: 2019-05-22 | End: 2020-08-12 | Stop reason: SDUPTHER

## 2019-05-22 RX ORDER — ERGOCALCIFEROL 1.25 MG/1
50000 CAPSULE ORAL
Qty: 4 CAPSULE | Refills: 6 | Status: SHIPPED | OUTPATIENT
Start: 2019-05-22 | End: 2019-08-20 | Stop reason: SDUPTHER

## 2019-05-22 RX ORDER — QUETIAPINE FUMARATE 50 MG/1
150 TABLET, FILM COATED ORAL NIGHTLY
Qty: 90 TABLET | Refills: 6 | Status: SHIPPED | OUTPATIENT
Start: 2019-05-22 | End: 2019-08-20 | Stop reason: SDUPTHER

## 2019-05-22 RX ORDER — HYDROCODONE BITARTRATE AND ACETAMINOPHEN 10; 325 MG/1; MG/1
1 TABLET ORAL EVERY 8 HOURS PRN
Qty: 90 TABLET | Refills: 0 | Status: SHIPPED | OUTPATIENT
Start: 2019-07-21 | End: 2019-05-22 | Stop reason: SDUPTHER

## 2019-05-22 RX ORDER — HYDROCODONE BITARTRATE AND ACETAMINOPHEN 10; 325 MG/1; MG/1
1 TABLET ORAL EVERY 8 HOURS PRN
Qty: 90 TABLET | Refills: 0 | Status: SHIPPED | OUTPATIENT
Start: 2019-08-20 | End: 2019-09-19 | Stop reason: SDUPTHER

## 2019-05-22 RX ORDER — CLOBETASOL PROPIONATE 0.5 MG/G
CREAM TOPICAL 2 TIMES DAILY
Qty: 60 G | Refills: 5 | Status: SHIPPED | OUTPATIENT
Start: 2019-05-22 | End: 2021-04-15

## 2019-05-23 LAB
HBV CORE IGM SERPL QL IA: NEGATIVE
HCV AB SERPL QL IA: NEGATIVE

## 2019-05-27 ENCOUNTER — TELEPHONE (OUTPATIENT)
Dept: PODIATRY | Facility: CLINIC | Age: 59
End: 2019-05-27

## 2019-05-27 NOTE — TELEPHONE ENCOUNTER
----- Message from Quentin De Guzman sent at 5/27/2019 11:10 AM CDT -----  Contact: patient  Type: Needs Medical Advice    Who Called:  patient  Symptoms (please be specific):    How long has patient had these symptoms:   Pharmacy name and phone #:    Best Call Back Number: [phone]  Additional Information: requesting a call back to discuss test results from 05/22/19

## 2019-05-28 LAB
M TB IFN-G CD4+ BCKGRND COR BLD-ACNC: -0.02 IU/ML
MITOGEN IGNF BCKGRD COR BLD-ACNC: 4.76 IU/ML
MITOGEN IGNF BCKGRD COR BLD-ACNC: NEGATIVE [IU]/ML
NIL: 0.04 IU/ML
TB2 - NIL: -0.01 IU/ML

## 2019-05-29 NOTE — TELEPHONE ENCOUNTER
----- Message from Talha Weiss sent at 5/29/2019  3:29 PM CDT -----  Type:  Patient Call Back    Who Called:  pt  Does the patient know what this is regarding?:please mail results tp the pt   Best Call Back Number:  [phone]  Additional Information:  Please call pt and leave a detailed message if there is no answer.

## 2019-05-31 ENCOUNTER — TELEPHONE (OUTPATIENT)
Dept: RHEUMATOLOGY | Facility: CLINIC | Age: 59
End: 2019-05-31

## 2019-05-31 NOTE — TELEPHONE ENCOUNTER
Returned patient call regarding appointment. Patient stated she only has a ride on tuesdays. Nurse rescheduled august appointment to a Tuesday morning. Patient aware of new date and time of appointment.

## 2019-05-31 NOTE — TELEPHONE ENCOUNTER
----- Message from Gena Sahu sent at 5/31/2019 11:59 AM CDT -----  Type:  Sooner Apoointment Request    Caller is requesting a sooner appointment.  Caller declined first available appointment listed below.  Caller will not accept being placed on the waitlist and is requesting a message be sent to doctor.    Name of Caller:  Self   When is the first available appointment?  No future appointments  Symptoms:  Best Call Back Number: [phone] Information: Patient asking to reschedule appointment for Tuesday morning only. Patient's daughter bring patient to the appointments.

## 2019-06-07 ENCOUNTER — TELEPHONE (OUTPATIENT)
Dept: RHEUMATOLOGY | Facility: CLINIC | Age: 59
End: 2019-06-07

## 2019-06-07 NOTE — TELEPHONE ENCOUNTER
"----- Message from Mary Metz PharmD sent at 6/6/2019  3:39 PM CDT -----  Good afternoon Dr. Rodriguez and Staff,    Ms. Prasanna has recently discontinued taking Otezla and had an office visit 5/22. I see the note to "consider Xeljanz." Is the patient receiving a prescription for Xeljanz? [facility] wanted to verify if the patient will have alternative treatment since discontinuing Otezla. Thank you.    Best Regards,    Julio C CabreraD  Clinical Pharamcist  [facility]  [phone]    "

## 2019-06-07 NOTE — TELEPHONE ENCOUNTER
----- Message from Tamiko Tabares sent at 6/7/2019 12:12 PM CDT -----  Contact: Barbara pt  Type:  Test Results    Who Called:  Barbara  Name of Test (Lab/Mammo/Etc):  Blood work  Date of Test:  2 weeks ago  Ordering Provider:  Jennifer  Where the test was performed:  [facility] Call Back Number:  [phone]  Additional Information:  Pls call pt w/ results

## 2019-06-14 ENCOUNTER — TELEPHONE (OUTPATIENT)
Dept: RHEUMATOLOGY | Facility: CLINIC | Age: 59
End: 2019-06-14

## 2019-06-14 NOTE — TELEPHONE ENCOUNTER
Spoke with patient regarding lab results. Informed Dr Rodriguez has reviewed results and advised to take b complex otc and see a pcp regarding cholesterol. Patient does not currently have a pcp at this time. Nurse advised to have cxr done before can send in another antibiotic

## 2019-06-14 NOTE — TELEPHONE ENCOUNTER
----- Message from Winifred Mcfadden sent at 6/14/2019 12:01 PM CDT -----  Contact: [phone]  Patient is requesting a call back from the nurse stated she been waiting over 2 weeks for her results.  Patient can not access the patient portal.    Please call the patient upon request at phone number [phone].

## 2019-07-01 DIAGNOSIS — F32.A DEPRESSION, UNSPECIFIED DEPRESSION TYPE: ICD-10-CM

## 2019-07-01 DIAGNOSIS — L40.8 PSORIASIS WITH PUSTULES: ICD-10-CM

## 2019-07-01 DIAGNOSIS — F41.9 ANXIETY DISORDER, UNSPECIFIED TYPE: ICD-10-CM

## 2019-07-01 DIAGNOSIS — R63.4 WEIGHT LOSS: ICD-10-CM

## 2019-07-01 DIAGNOSIS — I73.00 RAYNAUD'S DISEASE WITHOUT GANGRENE: ICD-10-CM

## 2019-07-01 DIAGNOSIS — E06.9 THYROIDITIS: ICD-10-CM

## 2019-07-01 DIAGNOSIS — L40.50 PSORIATIC ARTHRITIS: ICD-10-CM

## 2019-07-01 DIAGNOSIS — M79.7 FIBROMYALGIA: ICD-10-CM

## 2019-07-01 RX ORDER — BUTALBITAL, ACETAMINOPHEN AND CAFFEINE 50; 325; 40 MG/1; MG/1; MG/1
TABLET ORAL
Qty: 100 TABLET | Refills: 3 | Status: SHIPPED | OUTPATIENT
Start: 2019-07-01 | End: 2019-10-02 | Stop reason: SDUPTHER

## 2019-07-01 RX ORDER — CYCLOBENZAPRINE HCL 10 MG
10 TABLET ORAL 3 TIMES DAILY PRN
Qty: 90 TABLET | Refills: 3 | Status: SHIPPED | OUTPATIENT
Start: 2019-07-01 | End: 2019-07-11

## 2019-07-19 ENCOUNTER — TELEPHONE (OUTPATIENT)
Dept: PHARMACY | Facility: CLINIC | Age: 59
End: 2019-07-19

## 2019-07-19 NOTE — TELEPHONE ENCOUNTER
No answer/VM to inform patient that [facility] received prescription for Xeljanz XR and prior authorization is required.  [facility] will be back in touch once insurance determination is received.

## 2019-07-19 NOTE — TELEPHONE ENCOUNTER
----- Message from Piper Carrasco PharmD sent at 6/18/2019 11:51 AM CDT -----  Regarding: PsA treatment  Good morning,    Just checking if there is any update on plan for treatment for Ms. Mims. [facility] previously sent a message after her Otezla was discontinued and patient saw Dr. Rodriguez where it was mentioned she may consider Xeljanz. Just checking if we may close out or if patient will be starting Xeljanz? I do not see any prescription ordered.    Please advise. Thank you,  Piper Carrasco, PharmD  [facility]  [phone]

## 2019-07-19 NOTE — TELEPHONE ENCOUNTER
Patient stopped Otezla due to being sick. Patient stated was not told of another treatment option. Do you want her to start xeljanz?

## 2019-07-25 ENCOUNTER — TELEPHONE (OUTPATIENT)
Dept: RHEUMATOLOGY | Facility: CLINIC | Age: 59
End: 2019-07-25

## 2019-07-25 NOTE — TELEPHONE ENCOUNTER
DOCUMENTATION ONLY:     Prior Authorization for Xeljanz XR approved from 12/30/18 to 12/31/19.    Case ID# [id number]    Co-Pay: $8.50    Patient Assistance IS NOT required.     Forward to clinical pharmacist for consult & shipment.      SARAH

## 2019-07-25 NOTE — TELEPHONE ENCOUNTER
----- Message from Dale Beckett sent at 7/25/2019 10:47 AM CDT -----  Contact: Huong with [facility]  Type:  Pharmacy Calling to Clarify an RX    Name of Caller:  Huong  Pharmacy Name:  [facility]  Prescription Name:  tofacitinib (XELJANZ XR) 11 mg Tb24  What do they need to clarify?:  Questions about PA  Best Call Back Number:  [phone]  Additional Information:

## 2019-07-31 ENCOUNTER — TELEPHONE (OUTPATIENT)
Dept: PHARMACY | Facility: CLINIC | Age: 59
End: 2019-07-31

## 2019-08-01 ENCOUNTER — TELEPHONE (OUTPATIENT)
Dept: PHARMACY | Facility: CLINIC | Age: 59
End: 2019-08-01

## 2019-08-01 ENCOUNTER — PATIENT MESSAGE (OUTPATIENT)
Dept: PHARMACY | Facility: CLINIC | Age: 59
End: 2019-08-01

## 2019-08-01 NOTE — TELEPHONE ENCOUNTER
Called patient on 8/1 for Xeljanz XR initial consult/fill. Mailbox full, unable to LVM. Sent [facility] [employer] message. $0 copay in 004.

## 2019-08-08 DIAGNOSIS — K21.9 GASTROESOPHAGEAL REFLUX DISEASE, ESOPHAGITIS PRESENCE NOT SPECIFIED: ICD-10-CM

## 2019-08-09 RX ORDER — OMEPRAZOLE 40 MG/1
40 CAPSULE, DELAYED RELEASE ORAL DAILY
Qty: 30 CAPSULE | Refills: 5 | Status: SHIPPED | OUTPATIENT
Start: 2019-08-09 | End: 2019-12-20 | Stop reason: SDUPTHER

## 2019-08-09 NOTE — TELEPHONE ENCOUNTER
Called pt regarding Xeljanz XR initial consult / shipment.  (2nd attempt) No answer and the mailbox is full.  Previous [employer] message not read.

## 2019-08-20 ENCOUNTER — TELEPHONE (OUTPATIENT)
Dept: PHARMACY | Facility: CLINIC | Age: 59
End: 2019-08-20

## 2019-08-20 ENCOUNTER — OFFICE VISIT (OUTPATIENT)
Dept: RHEUMATOLOGY | Facility: CLINIC | Age: 59
End: 2019-08-20
Payer: MEDICARE

## 2019-08-20 VITALS
HEIGHT: 59 IN | SYSTOLIC BLOOD PRESSURE: 143 MMHG | BODY MASS INDEX: 24.39 KG/M2 | WEIGHT: 121 LBS | HEART RATE: 98 BPM | DIASTOLIC BLOOD PRESSURE: 97 MMHG

## 2019-08-20 DIAGNOSIS — I10 HYPERTENSION, UNSPECIFIED TYPE: ICD-10-CM

## 2019-08-20 DIAGNOSIS — E55.9 VITAMIN D DEFICIENCY: ICD-10-CM

## 2019-08-20 DIAGNOSIS — L40.50 PSORIATIC ARTHRITIS: Primary | ICD-10-CM

## 2019-08-20 DIAGNOSIS — E03.9 HYPOTHYROIDISM, UNSPECIFIED TYPE: ICD-10-CM

## 2019-08-20 DIAGNOSIS — M79.7 FIBROMYALGIA: ICD-10-CM

## 2019-08-20 DIAGNOSIS — F32.A DEPRESSION, UNSPECIFIED DEPRESSION TYPE: ICD-10-CM

## 2019-08-20 DIAGNOSIS — F11.90 CHRONIC, CONTINUOUS USE OF OPIOIDS: ICD-10-CM

## 2019-08-20 DIAGNOSIS — E78.5 HYPERLIPIDEMIA, UNSPECIFIED HYPERLIPIDEMIA TYPE: ICD-10-CM

## 2019-08-20 DIAGNOSIS — G47.00 INSOMNIA, UNSPECIFIED TYPE: ICD-10-CM

## 2019-08-20 DIAGNOSIS — G89.4 CHRONIC PAIN SYNDROME: ICD-10-CM

## 2019-08-20 PROCEDURE — 99214 OFFICE O/P EST MOD 30 MIN: CPT | Mod: S$PBB,,, | Performed by: PHYSICIAN ASSISTANT

## 2019-08-20 PROCEDURE — 99999 PR PBB SHADOW E&M-EST. PATIENT-LVL IV: ICD-10-PCS | Mod: PBBFAC,,, | Performed by: PHYSICIAN ASSISTANT

## 2019-08-20 PROCEDURE — 99214 OFFICE O/P EST MOD 30 MIN: CPT | Mod: PBBFAC,PO | Performed by: PHYSICIAN ASSISTANT

## 2019-08-20 PROCEDURE — 99214 PR OFFICE/OUTPT VISIT, EST, LEVL IV, 30-39 MIN: ICD-10-PCS | Mod: S$PBB,,, | Performed by: PHYSICIAN ASSISTANT

## 2019-08-20 PROCEDURE — 99999 PR PBB SHADOW E&M-EST. PATIENT-LVL IV: CPT | Mod: PBBFAC,,, | Performed by: PHYSICIAN ASSISTANT

## 2019-08-20 RX ORDER — CYCLOBENZAPRINE HCL 10 MG
10 TABLET ORAL 3 TIMES DAILY PRN
Qty: 90 TABLET | Refills: 3 | Status: SHIPPED | OUTPATIENT
Start: 2019-08-20 | End: 2020-02-13 | Stop reason: SDUPTHER

## 2019-08-20 RX ORDER — ERGOCALCIFEROL 1.25 MG/1
50000 CAPSULE ORAL
Qty: 4 CAPSULE | Refills: 6 | Status: SHIPPED | OUTPATIENT
Start: 2019-08-20 | End: 2021-04-15 | Stop reason: SDUPTHER

## 2019-08-20 RX ORDER — LISINOPRIL AND HYDROCHLOROTHIAZIDE 12.5; 2 MG/1; MG/1
1 TABLET ORAL DAILY
Qty: 90 TABLET | Refills: 1 | Status: SHIPPED | OUTPATIENT
Start: 2019-08-20 | End: 2019-12-20

## 2019-08-20 RX ORDER — PREDNISONE 5 MG/1
10 TABLET ORAL DAILY PRN
Qty: 60 TABLET | Refills: 6 | Status: SHIPPED | OUTPATIENT
Start: 2019-08-20 | End: 2019-12-20 | Stop reason: SDUPTHER

## 2019-08-20 RX ORDER — CYCLOBENZAPRINE HCL 10 MG
10 TABLET ORAL 3 TIMES DAILY PRN
Refills: 3 | COMMUNITY
Start: 2019-07-31 | End: 2019-08-20 | Stop reason: SDUPTHER

## 2019-08-20 RX ORDER — QUETIAPINE FUMARATE 50 MG/1
150 TABLET, FILM COATED ORAL NIGHTLY
Qty: 90 TABLET | Refills: 3 | Status: SHIPPED | OUTPATIENT
Start: 2019-08-20 | End: 2019-12-20 | Stop reason: SDUPTHER

## 2019-08-20 RX ORDER — LEVOTHYROXINE SODIUM 50 UG/1
50 TABLET ORAL
Qty: 30 TABLET | Refills: 6 | Status: SHIPPED | OUTPATIENT
Start: 2019-08-20 | End: 2020-03-06 | Stop reason: SDUPTHER

## 2019-08-20 RX ORDER — GABAPENTIN 300 MG/1
300 CAPSULE ORAL NIGHTLY
Qty: 30 CAPSULE | Refills: 6 | Status: SHIPPED | OUTPATIENT
Start: 2019-08-20 | End: 2019-12-20 | Stop reason: SDUPTHER

## 2019-08-20 RX ORDER — ATORVASTATIN CALCIUM 20 MG/1
20 TABLET, FILM COATED ORAL DAILY
Qty: 90 TABLET | Refills: 0 | Status: SHIPPED | OUTPATIENT
Start: 2019-08-20 | End: 2020-08-12 | Stop reason: SDUPTHER

## 2019-08-20 ASSESSMENT — ROUTINE ASSESSMENT OF PATIENT INDEX DATA (RAPID3)
TOTAL RAPID3 SCORE: 4.94
PAIN SCORE: 6.5
MDHAQ FUNCTION SCORE: 1
PSYCHOLOGICAL DISTRESS SCORE: 3.3
PATIENT GLOBAL ASSESSMENT SCORE: 5

## 2019-08-20 NOTE — TELEPHONE ENCOUNTER
Initial Xejanz XR consult completed on 19. Xeljanz XR will be shipped on  to arrive at patient's home on  via [employer]. $0 copay. Patient will start Xeljanz XR on . Address confirmed, CC on file. Confirmed 2 patient identifiers - name and . Therapy Appropriate.     Patient was counseled on the administration directions for Xeljanz:  -Take 1 tablet (11mg) by mouth once daily, with or without food  -If dose is missed, skip the missed dose and go back to your normal time.  Do not take 2 doses at the same time or extra doses    Patient was counseled on the following possible side effects, which include, but are not limited to:   -diarrhea, headache, cold like symptoms - runny nose, stuffy nose, sore throat.  Contact provider if allergic reaction, changes in heartbeat, muscle pain or weakness, signs of infection, liver problems - dark urine, fatigue, light-colored stools, yellow skin or eyes.       DDIs:  Medication list reviewed, up to date. No interactions found with use of Xeljanz XR.    Patient confirmed understanding. Patient did not have additional questions.  Patient will start Xeljanz XR on . Consultation included: indication; goals of treatment; administration; storage and handling; side effects; how to handle side effects; the importance of compliance; how to handle missed doses; the importance of laboratory monitoring; the importance of keeping all follow up appointments.  Patient understands to report any medication changes to [facility] and provider. All questions answered and addressed to patients satisfaction. I will f/u with her in 1 week from start, [facility] to contact patient in 3 weeks for refills    Piper Carrasco, PharmD  [facility]  [phone]

## 2019-08-20 NOTE — PROGRESS NOTES
Subjective:       Patient ID: Barbara Mims is a 59 y.o. female.    Chief Complaint: Psoriatic Arthritis    Mrs. Mims is a 58 year old female who presents to clinic for follow up on psoriatic arthritis and pain management. She has been doing poorly since her last visit. She has not started Xeljanz yet, but was approved and shipment will be set up today. She complains of joint pain involving her hands and feet with morning stiffness lasting between 1-2 hours. She has psoriatic nail changes, but no skin rash. She has chronic neck pain s/p cervical surgery and is no longer followed by pain management after multiple unsuccessful interventional treatments. BP has been running high when taking 1/2 tab losartan; however, it dropped to 80s/60 when she took 50 mg tablet. We reviewed recent labs. She plans to establish with primary care in [place].     Current treatment:  1. Norco 10/325 TID PRN for pain  2. OTC ibuprofen 400 mg daily  3. Prednisone 10 mg daily  4. Gabapentin 300 mg nightly    Review of Systems   Constitutional: Positive for activity change. Negative for appetite change, chills, fatigue and fever.   Eyes: Negative for visual disturbance.   Respiratory: Negative for cough and shortness of breath.    Cardiovascular: Negative for chest pain, palpitations and leg swelling.   Gastrointestinal: Negative for abdominal pain.   Musculoskeletal: Positive for arthralgias, joint swelling, neck pain and neck stiffness.   Neurological: Negative for dizziness, weakness, light-headedness and headaches.   Psychiatric/Behavioral: Negative for dysphoric mood. The patient is not nervous/anxious.          Objective:     Vitals:    08/20/19 0843   BP: (!) 143/97   Pulse: 98       Past Medical History:   Diagnosis Date    Alopecia     Anemia     Anxiety     Arthritis     Cardiac angina syndrome     Chronic kidney disease     kidney stones    Degenerative disc disease     Depression     Dry eyes     Dry mouth      Hyperlipidemia     Hypertension     Kidney stones     Mitral valve prolapse     Thyroid disease      Past Surgical History:   Procedure Laterality Date    CARDIAC CATHETERIZATION      CERVICAL DISC SURGERY      HYSTERECTOMY      LITHOTRIPSY            Physical Exam   Constitutional: She is well-developed, well-nourished, and in no distress.   Eyes: Right conjunctiva is not injected. Left conjunctiva is not injected. Right eye exhibits normal extraocular motion. Left eye exhibits normal extraocular motion.   Neck: No JVD present. Muscular tenderness present. No spinous process tenderness present. Decreased range of motion present. No thyromegaly present.   Cardiovascular: Normal rate and regular rhythm.  Exam reveals no decreased pulses.    Pulmonary/Chest: She has no wheezes. She has no rhonchi. She has no rales.       Right Side Rheumatological Exam     Examination finds the shoulder, elbow, wrist, knee, 4th MCP and 5th MCP normal.    The patient is tender to palpation of the 1st PIP, 1st MCP, 2nd PIP, 2nd MCP, 3rd PIP, 3rd MCP, 4th PIP and 5th PIP    The patient has an enlarged 1st PIP    Left Side Rheumatological Exam     Examination finds the shoulder, elbow, wrist, knee, 1st MCP, 2nd MCP, 3rd MCP, 4th MCP and 5th MCP normal.    The patient is tender to palpation of the 1st PIP, 2nd PIP, 3rd PIP, 4th PIP and 5th PIP.    The patient has an enlarged 1st PIP.      Back/Neck Exam   Tenderness Right paramedian tenderness of the Lower C-Spine and Upper T-Spine.Left paramedian tenderness of the Lower C-Spine and Upper T-Spine.    Neck Range of Motion   Flexion: Limited  Extension: Limited  Right Lateral Bend: abnormal  Left Lateral Bend: abnormal  Right Rotation: abnormal  Left Rotation: abnormal  Lymphadenopathy:     She has no cervical adenopathy.   Neurological: Gait normal.   Skin: No rash noted.     +psoriatic nail changes   Psychiatric: Mood and affect normal.       Recent labs:  Component      Latest  Ref Rng & Units 5/22/2019   WBC      3.90 - 12.70 K/uL 9.27   RBC      4.00 - 5.40 M/uL 4.08   Hemoglobin      12.0 - 16.0 g/dL 13.6   Hematocrit      37.0 - 48.5 % 41.0   MCV      82 - 98 fL 101 (H)   MCH      27.0 - 31.0 pg 33.3 (H)   MCHC      32.0 - 36.0 g/dL 33.2   RDW      11.5 - 14.5 % 12.1   Platelets      150 - 350 K/uL 555 (H)   MPV      9.2 - 12.9 fL 8.9 (L)   Immature Granulocytes      0.0 - 0.5 % 0.8 (H)   Gran # (ANC)      1.8 - 7.7 K/uL 4.7   Immature Grans (Abs)      0.00 - 0.04 K/uL 0.07 (H)   Lymph #      1.0 - 4.8 K/uL 3.4   Mono #      0.3 - 1.0 K/uL 0.7   Eos #      0.0 - 0.5 K/uL 0.3   Baso #      0.00 - 0.20 K/uL 0.15   nRBC      0 /100 WBC 0   Gran%      38.0 - 73.0 % 50.4   Lymph%      18.0 - 48.0 % 36.4   Mono%      4.0 - 15.0 % 7.8   Eosinophil%      0.0 - 8.0 % 3.0   Basophil%      0.0 - 1.9 % 1.6   Differential Method       Automated   Sodium      136 - 145 mmol/L 138   Potassium      3.5 - 5.1 mmol/L 4.3   Chloride      95 - 110 mmol/L 109   CO2      23 - 29 mmol/L 19 (L)   Glucose      70 - 110 mg/dL 90   BUN, Bld      6 - 20 mg/dL 19   Creatinine      0.5 - 1.4 mg/dL 0.8   Calcium      8.7 - 10.5 mg/dL 10.0   PROTEIN TOTAL      6.0 - 8.4 g/dL 7.9   Albumin      3.5 - 5.2 g/dL 4.1   BILIRUBIN TOTAL      0.1 - 1.0 mg/dL 0.2   Alkaline Phosphatase      55 - 135 U/L 125   AST      10 - 40 U/L 17   ALT      10 - 44 U/L 19   Anion Gap      8 - 16 mmol/L 10   eGFR if African American      >60 mL/min/1.73 m:2 >60.0   eGFR if non African American      >60 mL/min/1.73 m:2 >60.0   Cholesterol      120 - 199 mg/dL 349 (H)   Triglycerides      30 - 150 mg/dL 557 (H)   HDL      40 - 75 mg/dL 41   LDL Cholesterol External      63.0 - 159.0 mg/dL Invalid, Trig>400.0   Hdl/Cholesterol Ratio      20.0 - 50.0 % 11.7 (L)   Total Cholesterol/HDL Ratio      2.0 - 5.0 8.5 (H)   Non-HDL Cholesterol      mg/dL 308   NIL      See text IU/mL 0.040   TB1 - Nil      See text IU/mL -0.020   TB2 - Nil      See text  IU/mL -0.010   Mitogen - Nil      See text IU/mL 4.760   TB Gold Plus       Negative   Hemoglobin A1C External      4.0 - 5.6 % 5.5   Estimated Avg Glucose      68 - 131 mg/dL 111   CRP      0.0 - 8.2 mg/L 3.5   Sed Rate      0 - 20 mm/Hr 14   TSH      0.400 - 4.000 uIU/mL 1.358   Free T4      0.71 - 1.51 ng/dL 0.85   T3, Free      2.3 - 4.2 pg/mL 2.1 (L)   Hep B C IgM       Negative   Hepatitis C Ab       Negative     Assessment:       1. Psoriatic arthritis    2. Hypertension, unspecified type    3. Hyperlipidemia, unspecified hyperlipidemia type    4. Hypothyroidism, unspecified type    5. Depression, unspecified depression type    6. Fibromyalgia    7. Vitamin D deficiency    8. Insomnia, unspecified type    9. Chronic, continuous use of opioids    10. Chronic pain syndrome            Plan:       Psoriatic arthritis  -     predniSONE (DELTASONE) 5 MG tablet; Take 2 tablets (10 mg total) by mouth daily as needed.  Dispense: 60 tablet; Refill: 6  -     cyclobenzaprine (FLEXERIL) 10 MG tablet; Take 1 tablet (10 mg total) by mouth 3 (three) times daily as needed.  Dispense: 90 tablet; Refill: 3  -     gabapentin (NEURONTIN) 300 MG capsule; Take 1 capsule (300 mg total) by mouth nightly.  Dispense: 30 capsule; Refill: 6    Hypertension, unspecified type  -     lisinopril-hydrochlorothiazide (PRINZIDE,ZESTORETIC) 20-12.5 mg per tablet; Take 1 tablet by mouth once daily.  Dispense: 90 tablet; Refill: 1    Hyperlipidemia, unspecified hyperlipidemia type  -     atorvastatin (LIPITOR) 20 MG tablet; Take 1 tablet (20 mg total) by mouth once daily.  Dispense: 90 tablet; Refill: 0    Hypothyroidism, unspecified type  -     levothyroxine (SYNTHROID) 50 MCG tablet; Take 1 tablet (50 mcg total) by mouth before breakfast.  Dispense: 30 tablet; Refill: 6    Depression, unspecified depression type  -     QUEtiapine (SEROQUEL) 50 MG tablet; Take 3 tablets (150 mg total) by mouth every evening.  Dispense: 90 tablet; Refill:  3    Fibromyalgia    Vitamin D deficiency  -     ergocalciferol (ERGOCALCIFEROL) 50,000 unit Cap; Take 1 capsule (50,000 Units total) by mouth every 7 days.  Dispense: 4 capsule; Refill: 6    Insomnia, unspecified type  -     QUEtiapine (SEROQUEL) 50 MG tablet; Take 3 tablets (150 mg total) by mouth every evening.  Dispense: 90 tablet; Refill: 3    Chronic, continuous use of opioids    Chronic pain syndrome        Assessment:  58 year old female with   Psoriatic arthritis, psoriasis, fibromyalgia  --chronic neck pain s/p cervical surgery    Plan:  1. Start Xeljanz XR 11 mg daily --contact [facility] to set up delivery  2. D/C losartan. Start lisinopril/hctz 20-12.5 mg daily. Monitor BP at home and keep a log. Notify clinic if BP is persistently >140/90.    3. Continue prednisone 10 mg daily for now. Taper encouraged when possible.  4. Start lipitor 20 mg daily. Follow up with PCP for tx of hyperlipidemia/elevated triglycerides  5. Continue Vit D  6. Continue gabapentin, flexeril, norco as prescribed    Follow up: 3 months with Dr. Rodriguez with labs and CXR prior

## 2019-09-17 ENCOUNTER — PATIENT MESSAGE (OUTPATIENT)
Dept: PHARMACY | Facility: CLINIC | Age: 59
End: 2019-09-17

## 2019-09-17 ENCOUNTER — TELEPHONE (OUTPATIENT)
Dept: PHARMACY | Facility: CLINIC | Age: 59
End: 2019-09-17

## 2019-09-17 NOTE — TELEPHONE ENCOUNTER
----- Message from Laura Fowler sent at 12/19/2018 12:11 PM CST -----  Contact: Patient  Type: Needs Medical Advice    Who Called: Patient  Symptoms (please be specific):  na  How long has patient had these symptoms:  ariadne  Pharmacy name and phone #:  [facility]  Best Call Back Number:   Additional Information: Calling to speak with the Nurse. Please advise.      Anemia  pt states she was informed by her GYN that she may have thalassemia due to results of recent blood work. Pt was to follow up for additional blood work but has not gone yet  Missed ab    Spontaneous     Termination of pregnancy (fetus)    Vitamin D deficiency

## 2019-09-19 DIAGNOSIS — L40.50 PSORIATIC ARTHRITIS: ICD-10-CM

## 2019-09-19 DIAGNOSIS — G89.4 CHRONIC PAIN SYNDROME: Primary | ICD-10-CM

## 2019-09-19 NOTE — TELEPHONE ENCOUNTER
----- Message from Kaya Burrell sent at 9/19/2019 11:37 AM CDT -----  Type:  RX Refill Request    Who Called: Patient   Refill or New Rx:  refill  RX Name and Strength:  HYDROcodone-acetaminophen (NORCO)  mg per tablet  How is the patient currently taking it? (ex. 1XDay):    Is this a 30 day or 90 day RX:  30  Preferred Pharmacy with phone number:    [facility] [place], [place] - [address]  [address] [address]  Phone: [phone] Fax: [phone]  Local or Mail Order:  local  Ordering Provider:  Micheal Rodriguez  Best Call Back Number:  [phone]  Additional Information:  Patient prescription is due tomorrow

## 2019-09-20 RX ORDER — HYDROCODONE BITARTRATE AND ACETAMINOPHEN 10; 325 MG/1; MG/1
1 TABLET ORAL EVERY 8 HOURS PRN
Qty: 90 TABLET | Refills: 0 | Status: SHIPPED | OUTPATIENT
Start: 2019-09-20 | End: 2019-10-16 | Stop reason: SDUPTHER

## 2019-09-20 NOTE — TELEPHONE ENCOUNTER
----- Message from Kelsey Adame sent at 9/20/2019  4:42 PM CDT -----  Contact: Patient  Type: Needs Medical Advice    Who Called: Patient  Pharmacy name and phone #:    [facility] [place] - [address]  [address] [address]  Phone: [phone] Fax: [phone]      Best Call Back Number: [phone]  Additional Information: Patient is calling back to see what is going on with her Norco refill.Please call back and advise.

## 2019-09-20 NOTE — TELEPHONE ENCOUNTER
----- Message from Mayank Warner sent at 9/20/2019  8:54 AM CDT -----  Contact: pt  Type:  RX Refill Request    Who Called:  pt  Refill or New Rx:  refill  RX Name and Strength:  HYDROcodone-acetaminophen (NORCO)  mg per tablet  How is the patient currently taking it? (ex. 1XDay):  Take 1 tablet by mouth every 8 (eight) hours as needed for Pain  Is this a 30 day or 90 day RX:    Preferred Pharmacy with phone number:    [facility] [place], [place] - [address] [facility] [address]  Phone: [phone] Fax: [phone]      Local or Mail Order:  local  Ordering Provider:  Dr. Jennifer Bang Call Back Number:  [phone]  Additional Information:

## 2019-10-02 DIAGNOSIS — L40.50 PSORIATIC ARTHRITIS: ICD-10-CM

## 2019-10-02 DIAGNOSIS — I73.00 RAYNAUD'S DISEASE WITHOUT GANGRENE: ICD-10-CM

## 2019-10-02 DIAGNOSIS — F41.9 ANXIETY DISORDER, UNSPECIFIED TYPE: ICD-10-CM

## 2019-10-02 DIAGNOSIS — M79.7 FIBROMYALGIA: ICD-10-CM

## 2019-10-02 DIAGNOSIS — E06.9 THYROIDITIS: ICD-10-CM

## 2019-10-02 DIAGNOSIS — L40.8 PSORIASIS WITH PUSTULES: ICD-10-CM

## 2019-10-02 DIAGNOSIS — R63.4 WEIGHT LOSS: ICD-10-CM

## 2019-10-02 DIAGNOSIS — F32.A DEPRESSION, UNSPECIFIED DEPRESSION TYPE: ICD-10-CM

## 2019-10-02 NOTE — TELEPHONE ENCOUNTER
----- Message from Jennie Morris sent at 10/2/2019 12:32 PM CDT -----  Contact: patient  Type:  RX Refill Request    Who Called:  patient  Refill or New Rx:  refill  RX Name and Strength:  butalbital-acetaminophen-caffeine -40 mg (FIORICET, ESGIC) -40 mg per tablet  Preferred Pharmacy with phone number:  [address]  Local or Mail Order:  local  Best Call Back Number:  [phone]  Additional Information:

## 2019-10-04 NOTE — TELEPHONE ENCOUNTER
----- Message from Kaya Hess sent at 10/4/2019  3:44 PM CDT -----  Type:  RX Refill Request    Who Called: Patient  RX Name and Strength:  butalbital-acetaminophen-caffeine -40 mg (FIORICET, ESGIC) -40 mg per tablet  Preferred Pharmacy with phone number: [facility]  Best Call Back Number:  [phone]  Additional Information:

## 2019-10-07 RX ORDER — BUTALBITAL, ACETAMINOPHEN AND CAFFEINE 50; 325; 40 MG/1; MG/1; MG/1
TABLET ORAL
Qty: 100 TABLET | Refills: 3 | Status: SHIPPED | OUTPATIENT
Start: 2019-10-07 | End: 2020-01-06 | Stop reason: SDUPTHER

## 2019-10-10 ENCOUNTER — TELEPHONE (OUTPATIENT)
Dept: RHEUMATOLOGY | Facility: CLINIC | Age: 59
End: 2019-10-10

## 2019-10-10 NOTE — TELEPHONE ENCOUNTER
----- Message from Piper Carrasco PharmD sent at 10/9/2019 10:08 AM CDT -----  Regarding: Xeljanz XR  Dr Rodriguez and Staff,    [facility] has been attempting to reach Ms. Mims regarding prescription for Xeljanz. After numerous unsuccessful attempts, we are sending a postcard to the address on file. At this point in time, no further contact will be made from [facility] until patient responds to our mail correspondence.    If there is anything else we can do to further assist, please let us know.     Thank you,  Piper Carrasco  [facility]  P: [phone]

## 2019-10-16 DIAGNOSIS — G89.4 CHRONIC PAIN SYNDROME: ICD-10-CM

## 2019-10-16 DIAGNOSIS — L40.50 PSORIATIC ARTHRITIS: ICD-10-CM

## 2019-10-16 NOTE — TELEPHONE ENCOUNTER
----- Message from Laura Fowler sent at 10/16/2019 11:24 AM CDT -----  Contact: Patient  Type:  RX Refill Request    Who Called:  Patient  Refill or New Rx:  Refill  RX Name and Strength:  HYDROcodone-acetaminophen (NORCO)  mg per tablet  How is the patient currently taking it? (ex. 1XDay):  Take 1 tablet by mouth every 8 (eight) hours as needed for Pain. - Oral  Is this a 30 day or 90 day RX:  90 tablet  Preferred Pharmacy with phone number:    [facility] [address]  [address] [address]  Phone: [phone] Fax: [phone]  Local or Mail Order:  Local  Ordering Provider:  Dr Micheal Bang Call Back Number:  [phone]  Additional Information:  Calling to request a refill

## 2019-10-17 ENCOUNTER — TELEPHONE (OUTPATIENT)
Dept: PHARMACY | Facility: CLINIC | Age: 59
End: 2019-10-17

## 2019-10-17 NOTE — TELEPHONE ENCOUNTER
Pt called [facility] on 10/17 after no contact stating that she has not yet started Xeljanz XR due to having a stomach virus then being diagnosed with a sinus infection (or maybe bronchitis per pt). Pt states that she is currently taking doxycycline for the infection. Pt reports that she has 3 days of antibiotics left. Advised pt to hold off on starting Xeljanz XR until after she has completed her antibiotic treatment. Pt voiced understanding. Pt has a 30 day supply of Xeljanz XR on hand and plans to start Xeljanz once she is finished her doxycycline (~10/21). Will follow up with pt in one week to make sure she was able to start Xeljanz XR.

## 2019-10-17 NOTE — TELEPHONE ENCOUNTER
----- Message from Heidy Rm sent at 10/17/2019  2:08 PM CDT -----  Type: Needs Medical Advice    Who Called:  Pharmacy  Best Call Back Number: [phone] (home)   Additional Information: Patient is calling in  For the Rx [place]. Please send to [facility] she said they close early on Saturday and do not open On Sunday wants to know if they can have it sent to the pharm by Saturday morning.

## 2019-10-18 RX ORDER — HYDROCODONE BITARTRATE AND ACETAMINOPHEN 10; 325 MG/1; MG/1
1 TABLET ORAL EVERY 8 HOURS PRN
Qty: 90 TABLET | Refills: 0 | Status: SHIPPED | OUTPATIENT
Start: 2019-10-21 | End: 2019-11-18 | Stop reason: SDUPTHER

## 2019-11-14 ENCOUNTER — TELEPHONE (OUTPATIENT)
Dept: PHARMACY | Facility: CLINIC | Age: 59
End: 2019-11-14

## 2019-11-14 ENCOUNTER — PATIENT MESSAGE (OUTPATIENT)
Dept: PHARMACY | Facility: CLINIC | Age: 59
End: 2019-11-14

## 2019-11-18 DIAGNOSIS — G89.4 CHRONIC PAIN SYNDROME: ICD-10-CM

## 2019-11-18 DIAGNOSIS — L40.50 PSORIATIC ARTHRITIS: ICD-10-CM

## 2019-11-18 RX ORDER — HYDROCODONE BITARTRATE AND ACETAMINOPHEN 10; 325 MG/1; MG/1
1 TABLET ORAL EVERY 8 HOURS PRN
Qty: 90 TABLET | Refills: 0 | Status: SHIPPED | OUTPATIENT
Start: 2019-11-18 | End: 2019-12-16 | Stop reason: SDUPTHER

## 2019-11-18 NOTE — TELEPHONE ENCOUNTER
----- Message from Libby Brewer sent at 11/18/2019  9:15 AM CST -----  Contact: pt  Reason:Refill request for HYDROcodone-acetaminophen (NORCO)  mg per tablet    Communication: [phone]

## 2019-12-16 DIAGNOSIS — G89.4 CHRONIC PAIN SYNDROME: ICD-10-CM

## 2019-12-16 DIAGNOSIS — L40.50 PSORIATIC ARTHRITIS: ICD-10-CM

## 2019-12-16 NOTE — TELEPHONE ENCOUNTER
----- Message from Dale Beckett sent at 12/16/2019 10:04 AM CST -----  Contact: pt  Type:  RX Refill Request    Who Called:  pt  Refill or New Rx:  refll  RX Name and Strength:  HYDROcodone-acetaminophen (NORCO)  mg per tablet  How is the patient currently taking it? (ex. 1XDay):  3 x day  Is this a 30 day or 90 day RX:  30  [facility]erred Pharmacy with phone number:    [facility] [address]  [address] [address]  Phone: [phone] Fax: [phone]    Local or Mail Order:    Ordering Provider:    Best Call Back Number:  [phone]  Additional Information:  Pt understands this is not able to be filled until thursday

## 2019-12-17 NOTE — TELEPHONE ENCOUNTER
Returned patient call and informed refill request was sent yesterday to Dr Rodriguez. Informed refill not due until Thursday and waiting for Dr Rodriguez to send to the pharmacy. Patient verbalized understanding.

## 2019-12-17 NOTE — TELEPHONE ENCOUNTER
----- Message from Efrain Pearl sent at 12/17/2019  1:33 PM CST -----  Contact: pt   Type:  RX Refill Request    Who Called:  Pt   Refill or New Rx:refill   RX Name and Strength:  HYDROcodone-acetaminophen (NORCO)  mg per tablet  How is the patient currently taking it? (ex. 1XDay):  3 x day   Is this a 30 day or 90 day RX: 90  Preferred Pharmacy with phone number:    [facility] [place], [place] - [address]  [address] [address]  Phone: [phone] Fax: [phone]      Local or Mail Order:  Local   Ordering Provider:  Jennifer Bang Call Back Number:  [phone]  Additional Information:  Pt says pharmacy has not got any refill on rx. Pt would like it on Thursday 12/19/19

## 2019-12-17 NOTE — TELEPHONE ENCOUNTER
----- Message from Hilary Wright sent at 12/17/2019 11:13 AM CST -----  Contact: pt [phone]  Refill on her Narco pt is almost out of the medication.

## 2019-12-18 NOTE — TELEPHONE ENCOUNTER
----- Message from Magda Jonza sent at 12/18/2019 10:26 AM CST -----  Contact: self [phone]  She is calling to follow up on the refill request for the norco.  She said she was called and told that it had been ordered.  Pharmacy has not received it.  She will take the last one this evening.  Please notify her when it has been ordered. Thank you!

## 2019-12-19 ENCOUNTER — HOSPITAL ENCOUNTER (OUTPATIENT)
Dept: RADIOLOGY | Facility: HOSPITAL | Age: 59
Discharge: HOME | End: 2019-12-19
Attending: INTERNAL MEDICINE
Payer: MEDICARE

## 2019-12-19 DIAGNOSIS — L40.8 PSORIASIS WITH PUSTULES: ICD-10-CM

## 2019-12-19 DIAGNOSIS — F32.A DEPRESSION, UNSPECIFIED DEPRESSION TYPE: ICD-10-CM

## 2019-12-19 DIAGNOSIS — R63.4 WEIGHT LOSS: ICD-10-CM

## 2019-12-19 DIAGNOSIS — M79.7 FIBROMYALGIA: ICD-10-CM

## 2019-12-19 DIAGNOSIS — E06.9 THYROIDITIS: ICD-10-CM

## 2019-12-19 DIAGNOSIS — F41.9 ANXIETY DISORDER, UNSPECIFIED TYPE: ICD-10-CM

## 2019-12-19 DIAGNOSIS — J40 BRONCHITIS: ICD-10-CM

## 2019-12-19 DIAGNOSIS — L40.50 PSORIATIC ARTHRITIS: ICD-10-CM

## 2019-12-19 DIAGNOSIS — I73.00 RAYNAUD'S DISEASE WITHOUT GANGRENE: ICD-10-CM

## 2019-12-19 PROCEDURE — 71046 X-RAY EXAM CHEST 2 VIEWS: CPT | Mod: TC,PO

## 2019-12-19 PROCEDURE — 71046 X-RAY EXAM CHEST 2 VIEWS: CPT | Mod: 26,,, | Performed by: RADIOLOGY

## 2019-12-19 PROCEDURE — 71046 XR CHEST PA AND LATERAL: ICD-10-PCS | Mod: 26,,, | Performed by: RADIOLOGY

## 2019-12-19 RX ORDER — HYDROCODONE BITARTRATE AND ACETAMINOPHEN 10; 325 MG/1; MG/1
1 TABLET ORAL EVERY 8 HOURS PRN
Qty: 90 TABLET | Refills: 0 | Status: SHIPPED | OUTPATIENT
Start: 2019-12-19 | End: 2019-12-20 | Stop reason: SDUPTHER

## 2019-12-20 ENCOUNTER — OFFICE VISIT (OUTPATIENT)
Dept: RHEUMATOLOGY | Facility: CLINIC | Age: 59
End: 2019-12-20
Payer: MEDICARE

## 2019-12-20 VITALS
HEART RATE: 88 BPM | WEIGHT: 119.06 LBS | HEIGHT: 59 IN | SYSTOLIC BLOOD PRESSURE: 140 MMHG | DIASTOLIC BLOOD PRESSURE: 98 MMHG | BODY MASS INDEX: 24 KG/M2

## 2019-12-20 DIAGNOSIS — M79.7 FIBROMYALGIA: ICD-10-CM

## 2019-12-20 DIAGNOSIS — R79.9 ABNORMAL FINDING OF BLOOD CHEMISTRY, UNSPECIFIED: ICD-10-CM

## 2019-12-20 DIAGNOSIS — K21.9 GASTROESOPHAGEAL REFLUX DISEASE, ESOPHAGITIS PRESENCE NOT SPECIFIED: ICD-10-CM

## 2019-12-20 DIAGNOSIS — I73.00 RAYNAUD'S DISEASE WITHOUT GANGRENE: ICD-10-CM

## 2019-12-20 DIAGNOSIS — E78.5 HYPERLIPIDEMIA, UNSPECIFIED HYPERLIPIDEMIA TYPE: Primary | ICD-10-CM

## 2019-12-20 DIAGNOSIS — F11.90 CHRONIC, CONTINUOUS USE OF OPIOIDS: ICD-10-CM

## 2019-12-20 DIAGNOSIS — F41.9 ANXIETY: ICD-10-CM

## 2019-12-20 DIAGNOSIS — G47.00 INSOMNIA, UNSPECIFIED TYPE: ICD-10-CM

## 2019-12-20 DIAGNOSIS — F41.9 ANXIETY DISORDER, UNSPECIFIED TYPE: ICD-10-CM

## 2019-12-20 DIAGNOSIS — G89.4 CHRONIC PAIN SYNDROME: ICD-10-CM

## 2019-12-20 DIAGNOSIS — E06.9 THYROIDITIS: ICD-10-CM

## 2019-12-20 DIAGNOSIS — E55.9 VITAMIN D DEFICIENCY: ICD-10-CM

## 2019-12-20 DIAGNOSIS — M06.00 SERONEGATIVE RHEUMATOID ARTHRITIS: ICD-10-CM

## 2019-12-20 DIAGNOSIS — L40.50 PSORIATIC ARTHRITIS: ICD-10-CM

## 2019-12-20 DIAGNOSIS — L40.8 PSORIASIS WITH PUSTULES: ICD-10-CM

## 2019-12-20 DIAGNOSIS — F32.A DEPRESSION, UNSPECIFIED DEPRESSION TYPE: ICD-10-CM

## 2019-12-20 DIAGNOSIS — R63.4 WEIGHT LOSS: ICD-10-CM

## 2019-12-20 PROCEDURE — 99999 PR PBB SHADOW E&M-EST. PATIENT-LVL III: CPT | Mod: PBBFAC,,, | Performed by: INTERNAL MEDICINE

## 2019-12-20 PROCEDURE — 99215 OFFICE O/P EST HI 40 MIN: CPT | Mod: S$PBB,,, | Performed by: INTERNAL MEDICINE

## 2019-12-20 PROCEDURE — 99213 OFFICE O/P EST LOW 20 MIN: CPT | Mod: PBBFAC,PO | Performed by: INTERNAL MEDICINE

## 2019-12-20 PROCEDURE — 99215 PR OFFICE/OUTPT VISIT, EST, LEVL V, 40-54 MIN: ICD-10-PCS | Mod: S$PBB,,, | Performed by: INTERNAL MEDICINE

## 2019-12-20 PROCEDURE — 99999 PR PBB SHADOW E&M-EST. PATIENT-LVL III: ICD-10-PCS | Mod: PBBFAC,,, | Performed by: INTERNAL MEDICINE

## 2019-12-20 RX ORDER — HYDROXYZINE PAMOATE 50 MG/1
50 CAPSULE ORAL EVERY 8 HOURS PRN
Qty: 90 CAPSULE | Refills: 3 | Status: SHIPPED | OUTPATIENT
Start: 2019-12-20 | End: 2021-04-15

## 2019-12-20 RX ORDER — GABAPENTIN 300 MG/1
300 CAPSULE ORAL NIGHTLY
Qty: 30 CAPSULE | Refills: 6 | Status: SHIPPED | OUTPATIENT
Start: 2019-12-20 | End: 2020-07-29

## 2019-12-20 RX ORDER — QUETIAPINE FUMARATE 50 MG/1
150 TABLET, FILM COATED ORAL NIGHTLY
Qty: 90 TABLET | Refills: 3 | Status: SHIPPED | OUTPATIENT
Start: 2019-12-20 | End: 2020-03-06 | Stop reason: SDUPTHER

## 2019-12-20 RX ORDER — CLARITHROMYCIN 250 MG/1
500 TABLET, FILM COATED ORAL EVERY 12 HOURS
Qty: 20 TABLET | Refills: 0 | Status: SHIPPED | OUTPATIENT
Start: 2019-12-20 | End: 2019-12-30

## 2019-12-20 RX ORDER — HYDROCODONE BITARTRATE AND ACETAMINOPHEN 10; 325 MG/1; MG/1
1 TABLET ORAL EVERY 8 HOURS PRN
Qty: 90 TABLET | Refills: 0 | Status: SHIPPED | OUTPATIENT
Start: 2020-01-19 | End: 2019-12-20 | Stop reason: SDUPTHER

## 2019-12-20 RX ORDER — PREDNISONE 5 MG/1
10 TABLET ORAL DAILY PRN
Qty: 60 TABLET | Refills: 6 | Status: SHIPPED | OUTPATIENT
Start: 2019-12-20 | End: 2020-07-02 | Stop reason: SDUPTHER

## 2019-12-20 RX ORDER — PAROXETINE HYDROCHLORIDE 20 MG/1
20 TABLET, FILM COATED ORAL DAILY
Qty: 30 TABLET | Refills: 11 | Status: SHIPPED | OUTPATIENT
Start: 2019-12-20 | End: 2020-08-12 | Stop reason: SDUPTHER

## 2019-12-20 RX ORDER — HYDROCODONE BITARTRATE AND ACETAMINOPHEN 10; 325 MG/1; MG/1
1 TABLET ORAL EVERY 8 HOURS PRN
Qty: 90 TABLET | Refills: 0 | Status: SHIPPED | OUTPATIENT
Start: 2020-02-18 | End: 2020-03-16 | Stop reason: SDUPTHER

## 2019-12-20 RX ORDER — LISINOPRIL 20 MG/1
20 TABLET ORAL DAILY
Qty: 90 TABLET | Refills: 3 | Status: SHIPPED | OUTPATIENT
Start: 2019-12-20 | End: 2020-08-12 | Stop reason: SDUPTHER

## 2019-12-20 RX ORDER — OMEPRAZOLE 40 MG/1
40 CAPSULE, DELAYED RELEASE ORAL DAILY
Qty: 30 CAPSULE | Refills: 5 | Status: SHIPPED | OUTPATIENT
Start: 2019-12-20 | End: 2020-10-15 | Stop reason: SDUPTHER

## 2019-12-20 ASSESSMENT — ROUTINE ASSESSMENT OF PATIENT INDEX DATA (RAPID3)
PSYCHOLOGICAL DISTRESS SCORE: 3.3
FATIGUE SCORE: 0
PATIENT GLOBAL ASSESSMENT SCORE: 7.5
PAIN SCORE: 7.5
TOTAL RAPID3 SCORE: 6.11
MDHAQ FUNCTION SCORE: 1

## 2019-12-20 NOTE — PROGRESS NOTES
Subjective:       Patient ID: Barbara Mims is a 59 y.o. female.    Chief Complaint: Pain; Disease Management; and Psoriatic Arthritis    Follow up:  58 year old female  psoriatic arthritis and pain management on xeljanz with minimal improvement she has cough, with congestion, but weight is stable. She has been doing poorly since her last visit. . She complains of joint pain involving her hands and feet with morning stiffness lasting between 1-2 hours. She has psoriatic nail changes, but no skin rash. She has chronic neck pain s/p cervical surgery and is no longer followed by pain management after multiple unsuccessful interventional treatments. BP has been running Current treatment:  1. Norco 10/325 TID PRN for pain  2.  ibuprofen 400 mg daily  3. Prednisone 10 mg daily  4. Gabapentin 300 mg nightly  5. xeljanz    Review of Systems   Constitutional: Positive for activity change. Negative for appetite change and chills.   Eyes: Negative for visual disturbance.   Respiratory: Negative for cough and shortness of breath.    Cardiovascular: Negative for chest pain, palpitations and leg swelling.   Gastrointestinal: Negative for abdominal pain.   Musculoskeletal: Positive for arthralgias, back pain, gait problem, joint swelling, myalgias, neck pain and neck stiffness.   Skin: Positive for rash.   Neurological: Negative for dizziness, weakness and light-headedness.   Psychiatric/Behavioral: Negative for dysphoric mood. The patient is not nervous/anxious.          Objective:     Vitals:    12/20/19 1522   BP: (!) 140/98   Pulse: 88       Past Medical History:   Diagnosis Date    Alopecia     Anemia     Anxiety     Arthritis     Cardiac angina syndrome     Chronic kidney disease     kidney stones    Degenerative disc disease     Depression     Dry eyes     Dry mouth     Hyperlipidemia     Hypertension     Kidney stones     Mitral valve prolapse     Thyroid disease      Past Surgical History:   Procedure  Laterality Date    CARDIAC CATHETERIZATION      CERVICAL DISC SURGERY      HYSTERECTOMY      LITHOTRIPSY            Physical Exam   Constitutional: She is well-developed, well-nourished, and in no distress.   Eyes: Right conjunctiva is not injected. Left conjunctiva is not injected. Right eye exhibits normal extraocular motion. Left eye exhibits normal extraocular motion.   Neck: No JVD present. Muscular tenderness present. No spinous process tenderness present. Decreased range of motion present. No thyromegaly present.   Cardiovascular: Normal rate and regular rhythm.  Exam reveals no decreased pulses.    Pulmonary/Chest: She has no wheezes. She has no rhonchi. She has no rales.       Right Side Rheumatological Exam     Examination finds the shoulder, elbow, wrist, knee, 4th MCP and 5th MCP normal.    The patient is tender to palpation of the 1st PIP, 1st MCP, 2nd PIP, 2nd MCP, 3rd PIP, 3rd MCP, 4th PIP and 5th PIP    The patient has an enlarged 1st PIP    Left Side Rheumatological Exam     Examination finds the shoulder, elbow, wrist, knee, 1st MCP, 2nd MCP, 3rd MCP, 4th MCP and 5th MCP normal.    The patient is tender to palpation of the 1st PIP, 2nd PIP, 3rd PIP, 4th PIP and 5th PIP.    The patient has an enlarged 1st PIP.      Back/Neck Exam   Tenderness Right paramedian tenderness of the Lower C-Spine and Upper T-Spine.Left paramedian tenderness of the Lower C-Spine and Upper T-Spine.    Neck Range of Motion   Flexion: Limited  Extension: Limited  Right Lateral Bend: abnormal  Left Lateral Bend: abnormal  Right Rotation: abnormal  Left Rotation: abnormal  Lymphadenopathy:     She has no cervical adenopathy.   Neurological: Gait normal.   Skin: No rash noted.     +psoriatic nail changes   Psychiatric: Mood and affect normal.   Musculoskeletal: She exhibits tenderness and deformity.   Psoriatic nail bed changes and inflammation.           Results for orders placed or performed in visit on 12/19/19   CBC auto  differential   Result Value Ref Range    WBC 7.97 3.90 - 12.70 K/uL    RBC 4.09 4.00 - 5.40 M/uL    Hemoglobin 13.1 12.0 - 16.0 g/dL    Hematocrit 42.3 37.0 - 48.5 %    Mean Corpuscular Volume 103 (H) 82 - 98 fL    Mean Corpuscular Hemoglobin 32.0 (H) 27.0 - 31.0 pg    Mean Corpuscular Hemoglobin Conc 31.0 (L) 32.0 - 36.0 g/dL    RDW 12.8 11.5 - 14.5 %    Platelets 477 (H) 150 - 350 K/uL    MPV 8.8 (L) 9.2 - 12.9 fL    Immature Granulocytes 0.1 0.0 - 0.5 %    Gran # (ANC) 4.2 1.8 - 7.7 K/uL    Immature Grans (Abs) 0.01 0.00 - 0.04 K/uL    Lymph # 2.8 1.0 - 4.8 K/uL    Mono # 0.5 0.3 - 1.0 K/uL    Eos # 0.3 0.0 - 0.5 K/uL    Baso # 0.14 0.00 - 0.20 K/uL    nRBC 0 0 /100 WBC    Gran% 52.8 38.0 - 73.0 %    Lymph% 35.1 18.0 - 48.0 %    Mono% 6.6 4.0 - 15.0 %    Eosinophil% 3.6 0.0 - 8.0 %    Basophil% 1.8 0.0 - 1.9 %    Differential Method Automated    Comprehensive metabolic panel   Result Value Ref Range    Sodium 143 136 - 145 mmol/L    Potassium 4.3 3.5 - 5.1 mmol/L    Chloride 108 95 - 110 mmol/L    CO2 26 23 - 29 mmol/L    Glucose 85 70 - 110 mg/dL    BUN, Bld 17 6 - 20 mg/dL    Creatinine 0.8 0.5 - 1.4 mg/dL    Calcium 9.8 8.7 - 10.5 mg/dL    Total Protein 7.9 6.0 - 8.4 g/dL    Albumin 4.0 3.5 - 5.2 g/dL    Total Bilirubin 0.2 0.1 - 1.0 mg/dL    Alkaline Phosphatase 127 55 - 135 U/L    AST 15 10 - 40 U/L    ALT 10 10 - 44 U/L    Anion Gap 9 8 - 16 mmol/L    eGFR if African American >60.0 >60 mL/min/1.73 m^2    eGFR if non African American >60.0 >60 mL/min/1.73 m^2   C-reactive protein   Result Value Ref Range    CRP 40.8 (H) 0.0 - 8.2 mg/L   Sedimentation rate   Result Value Ref Range    Sed Rate 37 (H) 0 - 20 mm/Hr   Vitamin D   Result Value Ref Range    Vit D, 25-Hydroxy 6 (L) 30 - 96 ng/mL     reviewed labs with patient during this visit       Assessment:       1. Hyperlipidemia, unspecified hyperlipidemia type    2. Psoriatic arthritis    3. Depression, unspecified depression type    4. Fibromyalgia    5.  Vitamin D deficiency    6. Insomnia, unspecified type    7. Chronic pain syndrome    8. Chronic, continuous use of opioids    9. Anxiety disorder, unspecified type    10. Gastroesophageal reflux disease, esophagitis presence not specified    11. Seronegative rheumatoid arthritis    12. Raynaud's disease without gangrene    13. Thyroiditis    14. Psoriasis with pustules    15. Weight loss    16. Anxiety    17. Abnormal finding of blood chemistry, unspecified             Plan:       Hyperlipidemia, unspecified hyperlipidemia type  -     omeprazole (PRILOSEC) 40 MG capsule; Take 1 capsule (40 mg total) by mouth once daily.  Dispense: 30 capsule; Refill: 5  -     gabapentin (NEURONTIN) 300 MG capsule; Take 1 capsule (300 mg total) by mouth nightly.  Dispense: 30 capsule; Refill: 6  -     predniSONE (DELTASONE) 5 MG tablet; Take 2 tablets (10 mg total) by mouth daily as needed.  Dispense: 60 tablet; Refill: 6  -     clarithromycin (BIAXIN) 250 MG tablet; Take 2 tablets (500 mg total) by mouth every 12 (twelve) hours. for 10 days  Dispense: 20 tablet; Refill: 0  -     upadacitinib (RINVOQ) 15 mg ER 24 hr tablet; Take 1 tablet (15 mg total) by mouth once daily.  Dispense: 30 tablet; Refill: 12  -     lisinopril (PRINIVIL,ZESTRIL) 20 MG tablet; Take 1 tablet (20 mg total) by mouth once daily.  Dispense: 90 tablet; Refill: 3  -     QUEtiapine (SEROQUEL) 50 MG tablet; Take 3 tablets (150 mg total) by mouth every evening.  Dispense: 90 tablet; Refill: 3  -     hydrOXYzine pamoate (VISTARIL) 50 MG Cap; Take 1 capsule (50 mg total) by mouth every 8 (eight) hours as needed (anxiety).  Dispense: 90 capsule; Refill: 3  -     CBC auto differential; Future; Expected date: 12/20/2019  -     Comprehensive metabolic panel; Future; Expected date: 12/20/2019  -     Sedimentation rate; Future; Expected date: 12/20/2019  -     C-reactive protein; Future; Expected date: 12/20/2019  -     Lipid panel; Future; Expected date: 12/20/2019  -      Hemoglobin A1c; Future; Expected date: 12/20/2019    Psoriatic arthritis  -     Discontinue: HYDROcodone-acetaminophen (NORCO)  mg per tablet; Take 1 tablet by mouth every 8 (eight) hours as needed for Pain.  Dispense: 90 tablet; Refill: 0  -     HYDROcodone-acetaminophen (NORCO)  mg per tablet; Take 1 tablet by mouth every 8 (eight) hours as needed for Pain.  Dispense: 90 tablet; Refill: 0  -     omeprazole (PRILOSEC) 40 MG capsule; Take 1 capsule (40 mg total) by mouth once daily.  Dispense: 30 capsule; Refill: 5  -     gabapentin (NEURONTIN) 300 MG capsule; Take 1 capsule (300 mg total) by mouth nightly.  Dispense: 30 capsule; Refill: 6  -     predniSONE (DELTASONE) 5 MG tablet; Take 2 tablets (10 mg total) by mouth daily as needed.  Dispense: 60 tablet; Refill: 6  -     clarithromycin (BIAXIN) 250 MG tablet; Take 2 tablets (500 mg total) by mouth every 12 (twelve) hours. for 10 days  Dispense: 20 tablet; Refill: 0  -     upadacitinib (RINVOQ) 15 mg ER 24 hr tablet; Take 1 tablet (15 mg total) by mouth once daily.  Dispense: 30 tablet; Refill: 12  -     paroxetine (PAXIL) 20 MG tablet; Take 1 tablet (20 mg total) by mouth once daily.  Dispense: 30 tablet; Refill: 11  -     lisinopril (PRINIVIL,ZESTRIL) 20 MG tablet; Take 1 tablet (20 mg total) by mouth once daily.  Dispense: 90 tablet; Refill: 3  -     QUEtiapine (SEROQUEL) 50 MG tablet; Take 3 tablets (150 mg total) by mouth every evening.  Dispense: 90 tablet; Refill: 3  -     hydrOXYzine pamoate (VISTARIL) 50 MG Cap; Take 1 capsule (50 mg total) by mouth every 8 (eight) hours as needed (anxiety).  Dispense: 90 capsule; Refill: 3  -     CBC auto differential; Future; Expected date: 12/20/2019  -     Comprehensive metabolic panel; Future; Expected date: 12/20/2019  -     Sedimentation rate; Future; Expected date: 12/20/2019  -     C-reactive protein; Future; Expected date: 12/20/2019  -     Lipid panel; Future; Expected date: 12/20/2019  -      Hemoglobin A1c; Future; Expected date: 12/20/2019    Depression, unspecified depression type  -     omeprazole (PRILOSEC) 40 MG capsule; Take 1 capsule (40 mg total) by mouth once daily.  Dispense: 30 capsule; Refill: 5  -     gabapentin (NEURONTIN) 300 MG capsule; Take 1 capsule (300 mg total) by mouth nightly.  Dispense: 30 capsule; Refill: 6  -     predniSONE (DELTASONE) 5 MG tablet; Take 2 tablets (10 mg total) by mouth daily as needed.  Dispense: 60 tablet; Refill: 6  -     clarithromycin (BIAXIN) 250 MG tablet; Take 2 tablets (500 mg total) by mouth every 12 (twelve) hours. for 10 days  Dispense: 20 tablet; Refill: 0  -     upadacitinib (RINVOQ) 15 mg ER 24 hr tablet; Take 1 tablet (15 mg total) by mouth once daily.  Dispense: 30 tablet; Refill: 12  -     paroxetine (PAXIL) 20 MG tablet; Take 1 tablet (20 mg total) by mouth once daily.  Dispense: 30 tablet; Refill: 11  -     lisinopril (PRINIVIL,ZESTRIL) 20 MG tablet; Take 1 tablet (20 mg total) by mouth once daily.  Dispense: 90 tablet; Refill: 3  -     QUEtiapine (SEROQUEL) 50 MG tablet; Take 3 tablets (150 mg total) by mouth every evening.  Dispense: 90 tablet; Refill: 3  -     hydrOXYzine pamoate (VISTARIL) 50 MG Cap; Take 1 capsule (50 mg total) by mouth every 8 (eight) hours as needed (anxiety).  Dispense: 90 capsule; Refill: 3  -     CBC auto differential; Future; Expected date: 12/20/2019  -     Comprehensive metabolic panel; Future; Expected date: 12/20/2019  -     Sedimentation rate; Future; Expected date: 12/20/2019  -     C-reactive protein; Future; Expected date: 12/20/2019  -     Lipid panel; Future; Expected date: 12/20/2019  -     Hemoglobin A1c; Future; Expected date: 12/20/2019    Fibromyalgia  -     omeprazole (PRILOSEC) 40 MG capsule; Take 1 capsule (40 mg total) by mouth once daily.  Dispense: 30 capsule; Refill: 5  -     gabapentin (NEURONTIN) 300 MG capsule; Take 1 capsule (300 mg total) by mouth nightly.  Dispense: 30 capsule; Refill:  6  -     predniSONE (DELTASONE) 5 MG tablet; Take 2 tablets (10 mg total) by mouth daily as needed.  Dispense: 60 tablet; Refill: 6  -     clarithromycin (BIAXIN) 250 MG tablet; Take 2 tablets (500 mg total) by mouth every 12 (twelve) hours. for 10 days  Dispense: 20 tablet; Refill: 0  -     upadacitinib (RINVOQ) 15 mg ER 24 hr tablet; Take 1 tablet (15 mg total) by mouth once daily.  Dispense: 30 tablet; Refill: 12  -     paroxetine (PAXIL) 20 MG tablet; Take 1 tablet (20 mg total) by mouth once daily.  Dispense: 30 tablet; Refill: 11  -     lisinopril (PRINIVIL,ZESTRIL) 20 MG tablet; Take 1 tablet (20 mg total) by mouth once daily.  Dispense: 90 tablet; Refill: 3  -     QUEtiapine (SEROQUEL) 50 MG tablet; Take 3 tablets (150 mg total) by mouth every evening.  Dispense: 90 tablet; Refill: 3  -     hydrOXYzine pamoate (VISTARIL) 50 MG Cap; Take 1 capsule (50 mg total) by mouth every 8 (eight) hours as needed (anxiety).  Dispense: 90 capsule; Refill: 3  -     CBC auto differential; Future; Expected date: 12/20/2019  -     Comprehensive metabolic panel; Future; Expected date: 12/20/2019  -     Sedimentation rate; Future; Expected date: 12/20/2019  -     C-reactive protein; Future; Expected date: 12/20/2019  -     Lipid panel; Future; Expected date: 12/20/2019  -     Hemoglobin A1c; Future; Expected date: 12/20/2019    Vitamin D deficiency  -     omeprazole (PRILOSEC) 40 MG capsule; Take 1 capsule (40 mg total) by mouth once daily.  Dispense: 30 capsule; Refill: 5  -     gabapentin (NEURONTIN) 300 MG capsule; Take 1 capsule (300 mg total) by mouth nightly.  Dispense: 30 capsule; Refill: 6  -     predniSONE (DELTASONE) 5 MG tablet; Take 2 tablets (10 mg total) by mouth daily as needed.  Dispense: 60 tablet; Refill: 6  -     clarithromycin (BIAXIN) 250 MG tablet; Take 2 tablets (500 mg total) by mouth every 12 (twelve) hours. for 10 days  Dispense: 20 tablet; Refill: 0  -     upadacitinib (RINVOQ) 15 mg ER 24 hr tablet;  Take 1 tablet (15 mg total) by mouth once daily.  Dispense: 30 tablet; Refill: 12  -     lisinopril (PRINIVIL,ZESTRIL) 20 MG tablet; Take 1 tablet (20 mg total) by mouth once daily.  Dispense: 90 tablet; Refill: 3  -     QUEtiapine (SEROQUEL) 50 MG tablet; Take 3 tablets (150 mg total) by mouth every evening.  Dispense: 90 tablet; Refill: 3  -     hydrOXYzine pamoate (VISTARIL) 50 MG Cap; Take 1 capsule (50 mg total) by mouth every 8 (eight) hours as needed (anxiety).  Dispense: 90 capsule; Refill: 3  -     CBC auto differential; Future; Expected date: 12/20/2019  -     Comprehensive metabolic panel; Future; Expected date: 12/20/2019  -     Sedimentation rate; Future; Expected date: 12/20/2019  -     C-reactive protein; Future; Expected date: 12/20/2019  -     Lipid panel; Future; Expected date: 12/20/2019  -     Hemoglobin A1c; Future; Expected date: 12/20/2019    Insomnia, unspecified type  -     omeprazole (PRILOSEC) 40 MG capsule; Take 1 capsule (40 mg total) by mouth once daily.  Dispense: 30 capsule; Refill: 5  -     gabapentin (NEURONTIN) 300 MG capsule; Take 1 capsule (300 mg total) by mouth nightly.  Dispense: 30 capsule; Refill: 6  -     predniSONE (DELTASONE) 5 MG tablet; Take 2 tablets (10 mg total) by mouth daily as needed.  Dispense: 60 tablet; Refill: 6  -     clarithromycin (BIAXIN) 250 MG tablet; Take 2 tablets (500 mg total) by mouth every 12 (twelve) hours. for 10 days  Dispense: 20 tablet; Refill: 0  -     upadacitinib (RINVOQ) 15 mg ER 24 hr tablet; Take 1 tablet (15 mg total) by mouth once daily.  Dispense: 30 tablet; Refill: 12  -     lisinopril (PRINIVIL,ZESTRIL) 20 MG tablet; Take 1 tablet (20 mg total) by mouth once daily.  Dispense: 90 tablet; Refill: 3  -     QUEtiapine (SEROQUEL) 50 MG tablet; Take 3 tablets (150 mg total) by mouth every evening.  Dispense: 90 tablet; Refill: 3  -     hydrOXYzine pamoate (VISTARIL) 50 MG Cap; Take 1 capsule (50 mg total) by mouth every 8 (eight) hours as  needed (anxiety).  Dispense: 90 capsule; Refill: 3  -     CBC auto differential; Future; Expected date: 12/20/2019  -     Comprehensive metabolic panel; Future; Expected date: 12/20/2019  -     Sedimentation rate; Future; Expected date: 12/20/2019  -     C-reactive protein; Future; Expected date: 12/20/2019  -     Lipid panel; Future; Expected date: 12/20/2019  -     Hemoglobin A1c; Future; Expected date: 12/20/2019    Chronic pain syndrome  -     Discontinue: HYDROcodone-acetaminophen (NORCO)  mg per tablet; Take 1 tablet by mouth every 8 (eight) hours as needed for Pain.  Dispense: 90 tablet; Refill: 0  -     HYDROcodone-acetaminophen (NORCO)  mg per tablet; Take 1 tablet by mouth every 8 (eight) hours as needed for Pain.  Dispense: 90 tablet; Refill: 0  -     omeprazole (PRILOSEC) 40 MG capsule; Take 1 capsule (40 mg total) by mouth once daily.  Dispense: 30 capsule; Refill: 5  -     gabapentin (NEURONTIN) 300 MG capsule; Take 1 capsule (300 mg total) by mouth nightly.  Dispense: 30 capsule; Refill: 6  -     predniSONE (DELTASONE) 5 MG tablet; Take 2 tablets (10 mg total) by mouth daily as needed.  Dispense: 60 tablet; Refill: 6  -     clarithromycin (BIAXIN) 250 MG tablet; Take 2 tablets (500 mg total) by mouth every 12 (twelve) hours. for 10 days  Dispense: 20 tablet; Refill: 0  -     upadacitinib (RINVOQ) 15 mg ER 24 hr tablet; Take 1 tablet (15 mg total) by mouth once daily.  Dispense: 30 tablet; Refill: 12  -     lisinopril (PRINIVIL,ZESTRIL) 20 MG tablet; Take 1 tablet (20 mg total) by mouth once daily.  Dispense: 90 tablet; Refill: 3  -     QUEtiapine (SEROQUEL) 50 MG tablet; Take 3 tablets (150 mg total) by mouth every evening.  Dispense: 90 tablet; Refill: 3  -     hydrOXYzine pamoate (VISTARIL) 50 MG Cap; Take 1 capsule (50 mg total) by mouth every 8 (eight) hours as needed (anxiety).  Dispense: 90 capsule; Refill: 3    Chronic, continuous use of opioids  -     omeprazole (PRILOSEC) 40 MG  capsule; Take 1 capsule (40 mg total) by mouth once daily.  Dispense: 30 capsule; Refill: 5  -     gabapentin (NEURONTIN) 300 MG capsule; Take 1 capsule (300 mg total) by mouth nightly.  Dispense: 30 capsule; Refill: 6  -     predniSONE (DELTASONE) 5 MG tablet; Take 2 tablets (10 mg total) by mouth daily as needed.  Dispense: 60 tablet; Refill: 6  -     clarithromycin (BIAXIN) 250 MG tablet; Take 2 tablets (500 mg total) by mouth every 12 (twelve) hours. for 10 days  Dispense: 20 tablet; Refill: 0  -     upadacitinib (RINVOQ) 15 mg ER 24 hr tablet; Take 1 tablet (15 mg total) by mouth once daily.  Dispense: 30 tablet; Refill: 12  -     lisinopril (PRINIVIL,ZESTRIL) 20 MG tablet; Take 1 tablet (20 mg total) by mouth once daily.  Dispense: 90 tablet; Refill: 3  -     QUEtiapine (SEROQUEL) 50 MG tablet; Take 3 tablets (150 mg total) by mouth every evening.  Dispense: 90 tablet; Refill: 3  -     hydrOXYzine pamoate (VISTARIL) 50 MG Cap; Take 1 capsule (50 mg total) by mouth every 8 (eight) hours as needed (anxiety).  Dispense: 90 capsule; Refill: 3  -     CBC auto differential; Future; Expected date: 12/20/2019  -     Comprehensive metabolic panel; Future; Expected date: 12/20/2019  -     Sedimentation rate; Future; Expected date: 12/20/2019  -     C-reactive protein; Future; Expected date: 12/20/2019  -     Lipid panel; Future; Expected date: 12/20/2019  -     Hemoglobin A1c; Future; Expected date: 12/20/2019    Anxiety disorder, unspecified type  -     omeprazole (PRILOSEC) 40 MG capsule; Take 1 capsule (40 mg total) by mouth once daily.  Dispense: 30 capsule; Refill: 5  -     gabapentin (NEURONTIN) 300 MG capsule; Take 1 capsule (300 mg total) by mouth nightly.  Dispense: 30 capsule; Refill: 6  -     predniSONE (DELTASONE) 5 MG tablet; Take 2 tablets (10 mg total) by mouth daily as needed.  Dispense: 60 tablet; Refill: 6  -     clarithromycin (BIAXIN) 250 MG tablet; Take 2 tablets (500 mg total) by mouth every 12  (twelve) hours. for 10 days  Dispense: 20 tablet; Refill: 0  -     upadacitinib (RINVOQ) 15 mg ER 24 hr tablet; Take 1 tablet (15 mg total) by mouth once daily.  Dispense: 30 tablet; Refill: 12  -     paroxetine (PAXIL) 20 MG tablet; Take 1 tablet (20 mg total) by mouth once daily.  Dispense: 30 tablet; Refill: 11  -     lisinopril (PRINIVIL,ZESTRIL) 20 MG tablet; Take 1 tablet (20 mg total) by mouth once daily.  Dispense: 90 tablet; Refill: 3  -     QUEtiapine (SEROQUEL) 50 MG tablet; Take 3 tablets (150 mg total) by mouth every evening.  Dispense: 90 tablet; Refill: 3  -     hydrOXYzine pamoate (VISTARIL) 50 MG Cap; Take 1 capsule (50 mg total) by mouth every 8 (eight) hours as needed (anxiety).  Dispense: 90 capsule; Refill: 3  -     CBC auto differential; Future; Expected date: 12/20/2019  -     Comprehensive metabolic panel; Future; Expected date: 12/20/2019  -     Sedimentation rate; Future; Expected date: 12/20/2019  -     C-reactive protein; Future; Expected date: 12/20/2019  -     Lipid panel; Future; Expected date: 12/20/2019  -     Hemoglobin A1c; Future; Expected date: 12/20/2019    Gastroesophageal reflux disease, esophagitis presence not specified  -     omeprazole (PRILOSEC) 40 MG capsule; Take 1 capsule (40 mg total) by mouth once daily.  Dispense: 30 capsule; Refill: 5  -     gabapentin (NEURONTIN) 300 MG capsule; Take 1 capsule (300 mg total) by mouth nightly.  Dispense: 30 capsule; Refill: 6  -     predniSONE (DELTASONE) 5 MG tablet; Take 2 tablets (10 mg total) by mouth daily as needed.  Dispense: 60 tablet; Refill: 6  -     clarithromycin (BIAXIN) 250 MG tablet; Take 2 tablets (500 mg total) by mouth every 12 (twelve) hours. for 10 days  Dispense: 20 tablet; Refill: 0  -     upadacitinib (RINVOQ) 15 mg ER 24 hr tablet; Take 1 tablet (15 mg total) by mouth once daily.  Dispense: 30 tablet; Refill: 12  -     lisinopril (PRINIVIL,ZESTRIL) 20 MG tablet; Take 1 tablet (20 mg total) by mouth once  daily.  Dispense: 90 tablet; Refill: 3  -     QUEtiapine (SEROQUEL) 50 MG tablet; Take 3 tablets (150 mg total) by mouth every evening.  Dispense: 90 tablet; Refill: 3  -     hydrOXYzine pamoate (VISTARIL) 50 MG Cap; Take 1 capsule (50 mg total) by mouth every 8 (eight) hours as needed (anxiety).  Dispense: 90 capsule; Refill: 3    Seronegative rheumatoid arthritis  -     upadacitinib (RINVOQ) 15 mg ER 24 hr tablet; Take 1 tablet (15 mg total) by mouth once daily.  Dispense: 30 tablet; Refill: 12  -     lisinopril (PRINIVIL,ZESTRIL) 20 MG tablet; Take 1 tablet (20 mg total) by mouth once daily.  Dispense: 90 tablet; Refill: 3  -     QUEtiapine (SEROQUEL) 50 MG tablet; Take 3 tablets (150 mg total) by mouth every evening.  Dispense: 90 tablet; Refill: 3  -     hydrOXYzine pamoate (VISTARIL) 50 MG Cap; Take 1 capsule (50 mg total) by mouth every 8 (eight) hours as needed (anxiety).  Dispense: 90 capsule; Refill: 3  -     CBC auto differential; Future; Expected date: 12/20/2019  -     Comprehensive metabolic panel; Future; Expected date: 12/20/2019  -     Sedimentation rate; Future; Expected date: 12/20/2019  -     C-reactive protein; Future; Expected date: 12/20/2019  -     Lipid panel; Future; Expected date: 12/20/2019  -     Hemoglobin A1c; Future; Expected date: 12/20/2019    Raynaud's disease without gangrene  -     paroxetine (PAXIL) 20 MG tablet; Take 1 tablet (20 mg total) by mouth once daily.  Dispense: 30 tablet; Refill: 11  -     lisinopril (PRINIVIL,ZESTRIL) 20 MG tablet; Take 1 tablet (20 mg total) by mouth once daily.  Dispense: 90 tablet; Refill: 3  -     QUEtiapine (SEROQUEL) 50 MG tablet; Take 3 tablets (150 mg total) by mouth every evening.  Dispense: 90 tablet; Refill: 3  -     hydrOXYzine pamoate (VISTARIL) 50 MG Cap; Take 1 capsule (50 mg total) by mouth every 8 (eight) hours as needed (anxiety).  Dispense: 90 capsule; Refill: 3    Thyroiditis  -     paroxetine (PAXIL) 20 MG tablet; Take 1 tablet  (20 mg total) by mouth once daily.  Dispense: 30 tablet; Refill: 11  -     lisinopril (PRINIVIL,ZESTRIL) 20 MG tablet; Take 1 tablet (20 mg total) by mouth once daily.  Dispense: 90 tablet; Refill: 3  -     QUEtiapine (SEROQUEL) 50 MG tablet; Take 3 tablets (150 mg total) by mouth every evening.  Dispense: 90 tablet; Refill: 3  -     hydrOXYzine pamoate (VISTARIL) 50 MG Cap; Take 1 capsule (50 mg total) by mouth every 8 (eight) hours as needed (anxiety).  Dispense: 90 capsule; Refill: 3    Psoriasis with pustules  -     paroxetine (PAXIL) 20 MG tablet; Take 1 tablet (20 mg total) by mouth once daily.  Dispense: 30 tablet; Refill: 11  -     lisinopril (PRINIVIL,ZESTRIL) 20 MG tablet; Take 1 tablet (20 mg total) by mouth once daily.  Dispense: 90 tablet; Refill: 3  -     QUEtiapine (SEROQUEL) 50 MG tablet; Take 3 tablets (150 mg total) by mouth every evening.  Dispense: 90 tablet; Refill: 3  -     hydrOXYzine pamoate (VISTARIL) 50 MG Cap; Take 1 capsule (50 mg total) by mouth every 8 (eight) hours as needed (anxiety).  Dispense: 90 capsule; Refill: 3  -     CBC auto differential; Future; Expected date: 12/20/2019  -     Comprehensive metabolic panel; Future; Expected date: 12/20/2019  -     Sedimentation rate; Future; Expected date: 12/20/2019  -     C-reactive protein; Future; Expected date: 12/20/2019  -     Lipid panel; Future; Expected date: 12/20/2019  -     Hemoglobin A1c; Future; Expected date: 12/20/2019    Weight loss  -     paroxetine (PAXIL) 20 MG tablet; Take 1 tablet (20 mg total) by mouth once daily.  Dispense: 30 tablet; Refill: 11  -     lisinopril (PRINIVIL,ZESTRIL) 20 MG tablet; Take 1 tablet (20 mg total) by mouth once daily.  Dispense: 90 tablet; Refill: 3  -     QUEtiapine (SEROQUEL) 50 MG tablet; Take 3 tablets (150 mg total) by mouth every evening.  Dispense: 90 tablet; Refill: 3  -     hydrOXYzine pamoate (VISTARIL) 50 MG Cap; Take 1 capsule (50 mg total) by mouth every 8 (eight) hours as  needed (anxiety).  Dispense: 90 capsule; Refill: 3    Anxiety  -     paroxetine (PAXIL) 20 MG tablet; Take 1 tablet (20 mg total) by mouth once daily.  Dispense: 30 tablet; Refill: 11  -     lisinopril (PRINIVIL,ZESTRIL) 20 MG tablet; Take 1 tablet (20 mg total) by mouth once daily.  Dispense: 90 tablet; Refill: 3  -     QUEtiapine (SEROQUEL) 50 MG tablet; Take 3 tablets (150 mg total) by mouth every evening.  Dispense: 90 tablet; Refill: 3  -     hydrOXYzine pamoate (VISTARIL) 50 MG Cap; Take 1 capsule (50 mg total) by mouth every 8 (eight) hours as needed (anxiety).  Dispense: 90 capsule; Refill: 3    Abnormal finding of blood chemistry, unspecified   -     Hemoglobin A1c; Future; Expected date: 12/20/2019        I have  check [place] prescription monitoring program site and no unusual or abnormal behavior has occurred pt understand the risk and benefits of taking opioid medications and has decided to continue the  medication   We will start rinvoq  over 50% of this visit was explain labs and possible disease states and course of treatments

## 2019-12-20 NOTE — TELEPHONE ENCOUNTER
----- Message from Princess BONNIE Treviño sent at 12/19/2019 12:10 PM CST -----  Contact: Patient  Type:  RX Refill Request    Who Called:  Patient  Refill or New Rx:  Refill   RX Name and Strength:  HYDROcodone-acetaminophen (NORCO)  mg per tablet  How is the patient currently taking it? (ex. 1XDay):  Route: Take 1 tablet by mouth every 8 (eight) hours as needed for Pain.  Is this a 30 day or 90 day RX:  90  [facility] Pharm[facility] with phone number:    [facility] [place] [address]  [address] [facility] [address]  Phone: [phone] Fax: [phone]  Local or Mail Order:  Local  Ordering Provider:  Dr. Jennifer Bang Call Back Number:  [phone]   Additional Information:  Requesting a call back when Rx is sent over.

## 2019-12-23 ENCOUNTER — TELEPHONE (OUTPATIENT)
Dept: PHARMACY | Facility: CLINIC | Age: 59
End: 2019-12-23

## 2019-12-23 NOTE — TELEPHONE ENCOUNTER
No answer/VM to inform patient that [facility] received prescription for Rinvoq and prior authorization is required.  [facility] will be back in touch once insurance determination is received.

## 2020-01-06 DIAGNOSIS — E06.9 THYROIDITIS: ICD-10-CM

## 2020-01-06 DIAGNOSIS — R63.4 WEIGHT LOSS: ICD-10-CM

## 2020-01-06 DIAGNOSIS — F41.9 ANXIETY DISORDER, UNSPECIFIED TYPE: ICD-10-CM

## 2020-01-06 DIAGNOSIS — I73.00 RAYNAUD'S DISEASE WITHOUT GANGRENE: ICD-10-CM

## 2020-01-06 DIAGNOSIS — M79.7 FIBROMYALGIA: ICD-10-CM

## 2020-01-06 DIAGNOSIS — G89.4 CHRONIC PAIN SYNDROME: Primary | ICD-10-CM

## 2020-01-06 DIAGNOSIS — F32.A DEPRESSION, UNSPECIFIED DEPRESSION TYPE: ICD-10-CM

## 2020-01-06 DIAGNOSIS — L40.8 PSORIASIS WITH PUSTULES: ICD-10-CM

## 2020-01-06 DIAGNOSIS — L40.50 PSORIATIC ARTHRITIS: ICD-10-CM

## 2020-01-06 NOTE — TELEPHONE ENCOUNTER
Returned patient call and informed refill was not sent at visit due to medication being filled on 12-16-19. Patient stated needs a refill for this month. Nurse informed a refill request will be sent to Dr Rodriguez. Advised request usually takes about three days. Patient verbalized understanding.  checked last filled 12-16-19 last OV 12-20-19 next OV 4-

## 2020-01-06 NOTE — TELEPHONE ENCOUNTER
----- Message from Whitney Majano sent at 1/6/2020 10:31 AM CST -----  Contact: pt  Pt states that she saw the Dr 12/20/19 and the Dr was supposed to call/fax over to her pharmacy pt's butalbital-acetaminophen-caffeine -40 mg (FIORICET, ESGIC) -40 mg per tablet. The pharmacy is telling her they have not received it..[phone] (home)             .  [facility] [place] [place] - [address]  [address] [facility] [address]  Phone: [phone] Fax: [phone]

## 2020-01-07 RX ORDER — BUTALBITAL, ACETAMINOPHEN AND CAFFEINE 50; 325; 40 MG/1; MG/1; MG/1
TABLET ORAL
Qty: 105 TABLET | Refills: 3 | Status: SHIPPED | OUTPATIENT
Start: 2020-01-07 | End: 2020-02-07

## 2020-01-31 ENCOUNTER — TELEPHONE (OUTPATIENT)
Dept: PHARMACY | Facility: CLINIC | Age: 60
End: 2020-01-31

## 2020-01-31 NOTE — TELEPHONE ENCOUNTER
DOCUMENTATION ONLY:  Prior authorization for Rinvoq approved from 1/21/2020 to until further notice.  Case ID# [id number]    Co-pay: $3.90    Patient Assistance IS NOT required.     Forward to clinical pharmacist for consult & shipment.

## 2020-01-31 NOTE — TELEPHONE ENCOUNTER
Initial Rinvoq consult completed on . RInvoq will be shipped on 2/3 to arrive at patient's home on  via [employer]. $ 3.90 copay. Patient will start Rinvoq on . Address confirmed, CC on file. Confirmed 2 patient identifiers - name and . Therapy Appropriate.     Patient was counseled on the administration directions for Rinvoq:  -Take 1 tablet (15mg) by mouth twice daily, with or without food  -If dose is missed, skip the missed dose and go back to your normal time.  Do not take 2 doses at the same time or extra doses    Patient was counseled on the following possible side effects, which include, but are not limited to:   -diarrhea, headache, cold like symptoms - runny nose, stuffy nose, sore throat.  Contact provider if allergic reaction, changes in heartbeat, muscle pain or weakness, signs of infection, liver problems - dark urine, fatigue, light-colored stools, yellow skin or eyes.       DDIs:  Medication list reviewed, no notable DDIs.       Patient confirmed understanding. Patient did not have additional questions.  Patient will start Rinvoq on 2/3. Consultation included: indication; goals of treatment; administration; storage and handling; side effects; how to handle side effects; the importance of compliance; how to handle missed doses; the importance of laboratory monitoring; the importance of keeping all follow up appointments.  Patient understands to report any medication changes to [facility] and provider. All questions answered and addressed to patients satisfaction. [facility] will f/u with her in 1 week from start, [facility] to contact patient in 3 weeks for refills.

## 2020-02-04 ENCOUNTER — TELEPHONE (OUTPATIENT)
Dept: RHEUMATOLOGY | Facility: CLINIC | Age: 60
End: 2020-02-04

## 2020-02-04 NOTE — TELEPHONE ENCOUNTER
----- Message from Samantha Kulkarni PharmD sent at 1/31/2020  3:12 PM CST -----  During the initial consult for Ms Mims' Rinvoq she said she didn't have any more atorvastatin and she was unsure if she needed to keep taking it. I wasn't sure so I wanted to reach out to you, is Ms Mims continuing on atorvastatin or has it been discontinued?     Thank you for your time.     Sincerely,   Candace [facility]   [phone]

## 2020-02-04 NOTE — TELEPHONE ENCOUNTER
Please advise her to f/u with PCP for hyperlipidemia. We can fax PCP copy of last lipid panel if needed.

## 2020-02-05 NOTE — TELEPHONE ENCOUNTER
Returned patient call and informed will need to establish care with a PCP for cholesterol medication. Patient verbalized understanding.

## 2020-02-05 NOTE — TELEPHONE ENCOUNTER
----- Message from Kaya Burrell sent at 2/5/2020  9:03 AM CST -----  Type:  Patient Returning Call    Who Called:  Pateint   Who Left Message for Patient:  Sweta  Does the patient know what this is regarding?:    Best Call Back Number:  [phone] (home)     Additional Information:

## 2020-02-13 DIAGNOSIS — L40.50 PSORIATIC ARTHRITIS: ICD-10-CM

## 2020-02-13 NOTE — TELEPHONE ENCOUNTER
----- Message from Teresita Garcia sent at 2/13/2020 11:03 AM CST -----  Type:  RX Refill Request    Who Called:  Patient  Refill or New Rx:  Refills  RX Name and Strength:  cyclobenzaprine (FLEXERIL) 10 MG tablet, Levoxyl 50 mg, QUEtiapine (SEROQUEL) 50 MG tablet     Preferred Pharmacy with phone number:    [facility], [place] - [address]  [address] [address]  Phone: [phone] Fax: [phone]    Local or Mail Order:  local  Ordering Provider:  same  Best Call Back Number:  [phone] (home)     Additional Information:  Please call to advise.

## 2020-02-14 RX ORDER — CYCLOBENZAPRINE HCL 10 MG
10 TABLET ORAL 3 TIMES DAILY PRN
Qty: 90 TABLET | Refills: 3 | Status: SHIPPED | OUTPATIENT
Start: 2020-02-14 | End: 2020-06-11 | Stop reason: SDUPTHER

## 2020-03-04 ENCOUNTER — TELEPHONE (OUTPATIENT)
Dept: RHEUMATOLOGY | Facility: CLINIC | Age: 60
End: 2020-03-04

## 2020-03-04 NOTE — TELEPHONE ENCOUNTER
----- Message from Halley Krunal sent at 3/4/2020  8:33 AM CST -----  Contact: pt  Type: Needs Medical Advice    Who Called:      Best Call Back Number:   Additional Information: Requesting a call back from Nurse, regarding a refill for RxQUEtiapine (SEROQUEL) 50 MG tablet 90 tablet  And Rx  levothyroxine (SYNTHROID) 50 MCG tablet be called in Today ,please call back

## 2020-03-06 DIAGNOSIS — M79.7 FIBROMYALGIA: ICD-10-CM

## 2020-03-06 DIAGNOSIS — I73.00 RAYNAUD'S DISEASE WITHOUT GANGRENE: ICD-10-CM

## 2020-03-06 DIAGNOSIS — G47.00 INSOMNIA, UNSPECIFIED TYPE: ICD-10-CM

## 2020-03-06 DIAGNOSIS — M06.00 SERONEGATIVE RHEUMATOID ARTHRITIS: ICD-10-CM

## 2020-03-06 DIAGNOSIS — L40.8 PSORIASIS WITH PUSTULES: ICD-10-CM

## 2020-03-06 DIAGNOSIS — E78.5 HYPERLIPIDEMIA, UNSPECIFIED HYPERLIPIDEMIA TYPE: ICD-10-CM

## 2020-03-06 DIAGNOSIS — E06.9 THYROIDITIS: ICD-10-CM

## 2020-03-06 DIAGNOSIS — F41.9 ANXIETY DISORDER, UNSPECIFIED TYPE: ICD-10-CM

## 2020-03-06 DIAGNOSIS — E03.9 HYPOTHYROIDISM, UNSPECIFIED TYPE: ICD-10-CM

## 2020-03-06 DIAGNOSIS — K21.9 GASTROESOPHAGEAL REFLUX DISEASE, ESOPHAGITIS PRESENCE NOT SPECIFIED: ICD-10-CM

## 2020-03-06 DIAGNOSIS — L40.50 PSORIATIC ARTHRITIS: ICD-10-CM

## 2020-03-06 DIAGNOSIS — F11.90 CHRONIC, CONTINUOUS USE OF OPIOIDS: ICD-10-CM

## 2020-03-06 DIAGNOSIS — F41.9 ANXIETY: ICD-10-CM

## 2020-03-06 DIAGNOSIS — R63.4 WEIGHT LOSS: ICD-10-CM

## 2020-03-06 DIAGNOSIS — F32.A DEPRESSION, UNSPECIFIED DEPRESSION TYPE: ICD-10-CM

## 2020-03-06 DIAGNOSIS — G89.4 CHRONIC PAIN SYNDROME: ICD-10-CM

## 2020-03-06 DIAGNOSIS — E55.9 VITAMIN D DEFICIENCY: ICD-10-CM

## 2020-03-06 RX ORDER — QUETIAPINE FUMARATE 50 MG/1
150 TABLET, FILM COATED ORAL NIGHTLY
Qty: 90 TABLET | Refills: 3 | Status: SHIPPED | OUTPATIENT
Start: 2020-03-06 | End: 2020-07-02 | Stop reason: SDUPTHER

## 2020-03-06 RX ORDER — LEVOTHYROXINE SODIUM 50 UG/1
50 TABLET ORAL
Qty: 30 TABLET | Refills: 6 | Status: SHIPPED | OUTPATIENT
Start: 2020-03-06 | End: 2020-08-12 | Stop reason: SDUPTHER

## 2020-03-06 NOTE — TELEPHONE ENCOUNTER
----- Message from Maribel William sent at 3/6/2020 10:13 AM CST -----  Contact: Pt  Type: Rx Refill Request    Who Called: Pt  Refill or New Rx: refill  Rx Name and Strength: levothyroxine (SYNTHROID) 50 MCG tablet and QUEtiapine (SEROQUEL) 50 MG tablet  Is it a 30 day or 90 day? As directed  Preferred Pharmacy:  [facility] [address]  [address] [facility] [address]  Phone: [phone] Fax: [phone]    Local or Mail: local  Ordering Provider: alexus Bang Call Back Number: [phone]  Additional Information: Pt requesting a call back. Pt pharmacy closes at 2pm on Saturday's and will not ipen until Monday. Pt stated she's only have enough meds for tonight.  Please advise. Thank you

## 2020-03-16 DIAGNOSIS — G89.4 CHRONIC PAIN SYNDROME: ICD-10-CM

## 2020-03-16 DIAGNOSIS — L40.50 PSORIATIC ARTHRITIS: ICD-10-CM

## 2020-03-16 NOTE — TELEPHONE ENCOUNTER
----- Message from Winifred Mcfadden sent at 3/16/2020  9:23 AM CDT -----  Contact: self [phone]   Patient requesting a refill on norco.    Patient will be using   [facility] [address]  [address] [address]  Phone: [phone] Fax: [phone]    Please call patient at [phone]. Thanks!

## 2020-03-17 NOTE — TELEPHONE ENCOUNTER
----- Message from Freddy Cabrera sent at 3/17/2020  9:59 AM CDT -----  Contact: self   Patient need a refill on Norco please send to [facility], any questions please call back at [phone] (home)     Case number [id number]  [facility] [place], [place] - [address]  [address] [address]  Phone: [phone] Fax: [phone]

## 2020-03-18 RX ORDER — HYDROCODONE BITARTRATE AND ACETAMINOPHEN 10; 325 MG/1; MG/1
1 TABLET ORAL EVERY 8 HOURS PRN
Qty: 90 TABLET | Refills: 0 | Status: SHIPPED | OUTPATIENT
Start: 2020-03-18 | End: 2020-04-13 | Stop reason: SDUPTHER

## 2020-03-18 NOTE — TELEPHONE ENCOUNTER
----- Message from Chano Werner sent at 3/18/2020  1:28 PM CDT -----  Type: Needs Medical Advice    Who Called: Patient    Pharmacy name and phone #:    [facility] - [place], [place] - [address]  [address] [facility] [address]  Phone: [phone] Fax: [phone]      Best Call Back Number: [phone]  Additional Information: Patient states that both she and her pharmacy have trying to contact the office since Friday regarding the refill of her HYDROcodone-acetaminophen (NORCO)  mg per tablet with no response.  Please call to advise

## 2020-04-07 ENCOUNTER — TELEPHONE (OUTPATIENT)
Dept: PHARMACY | Facility: CLINIC | Age: 60
End: 2020-04-07

## 2020-04-13 ENCOUNTER — TELEPHONE (OUTPATIENT)
Dept: PHARMACY | Facility: CLINIC | Age: 60
End: 2020-04-13

## 2020-04-13 DIAGNOSIS — G89.4 CHRONIC PAIN SYNDROME: ICD-10-CM

## 2020-04-13 DIAGNOSIS — L40.50 PSORIATIC ARTHRITIS: ICD-10-CM

## 2020-04-13 NOTE — TELEPHONE ENCOUNTER
----- Message from Quentin De Guzman sent at 4/13/2020  9:57 AM CDT -----  Contact: patient  Type:  RX Refill Request    Who Called:  patient  Refill or New Rx:  refill  RX Name and Strength:  HYDROcodone-acetaminophen (NORCO)  mg per tabet, and Butalb/actamin caffeine 50mg   How is the patient currently taking it? (ex. 1XDay):    Is this a 30 day or 90 day RX:    Preferred Pharmacy with phone number:  [facility]  Local or Mail Order:  Local  Ordering Provider:  Dr.Spady Bang Call Back Number:    Additional Information:

## 2020-04-13 NOTE — TELEPHONE ENCOUNTER
RX call attempt 2 regarding Rinvoq refill from [facility]. Patient was not reached, voicemail full. Copay 0.00

## 2020-04-14 NOTE — TELEPHONE ENCOUNTER
----- Message from Cherise Lowe MA sent at 4/14/2020  4:18 PM CDT -----  Contact: patient  Type:  RX Refill Request    Who Called:  Barbara  Refill or New Rx:  refill  RX Name and Strength:  HYDROcodone-acetaminophen (NORCO)  mg per tablet 90 tablet 0 3/18/2020 4/17/2020   Sig - Route: Take 1 tablet by mouth every 8 (eight) hours as needed for Pain. - Oral       How is the patient currently taking it? (ex. 1XDay):  See above  Is this a 30 day or 90 day RX:  30  Preferred Pharmacy with phone number:    [facility] [place] [place] - [address]  [address] [address]  Phone: [phone] Fax: [phone]    Local or Mail Order:  local  Ordering Provider:    Best Call Back Number:  [phone]  Additional Information:  Patient is also needs her Fiorcet called in  It was not on her med list for me to add

## 2020-04-15 RX ORDER — BUTALBITAL, ACETAMINOPHEN AND CAFFEINE 50; 325; 40 MG/1; MG/1; MG/1
1 TABLET ORAL EVERY 6 HOURS PRN
Qty: 107 TABLET | Refills: 3 | Status: SHIPPED | OUTPATIENT
Start: 2020-04-15 | End: 2020-05-15

## 2020-04-15 RX ORDER — HYDROCODONE BITARTRATE AND ACETAMINOPHEN 10; 325 MG/1; MG/1
1 TABLET ORAL EVERY 8 HOURS PRN
Qty: 90 TABLET | Refills: 0 | Status: SHIPPED | OUTPATIENT
Start: 2020-04-15 | End: 2020-05-13 | Stop reason: SDUPTHER

## 2020-04-17 ENCOUNTER — TELEPHONE (OUTPATIENT)
Dept: PHARMACY | Facility: CLINIC | Age: 60
End: 2020-04-17

## 2020-04-23 NOTE — TELEPHONE ENCOUNTER
Pt confirmed shipping of Rinvoq on  to arrive on . Address and  verified. $0 copay in 004. Pt unsure of how many doses she has on hand. Pt reported forgetting to take her doses sometimes. Counseled her on importance of adherence. Advised pt to use pillbox, phone alarm, or to incorporate her dose with a daily routine to help remember to take her medication daily. Pt voiced understanding. Pt did not start any new medications. Pt had no further questions or concerns.

## 2020-04-24 ENCOUNTER — TELEPHONE (OUTPATIENT)
Dept: RHEUMATOLOGY | Facility: CLINIC | Age: 60
End: 2020-04-24

## 2020-04-24 NOTE — TELEPHONE ENCOUNTER
----- Message from Shira Watts MA sent at 4/24/2020  9:14 AM CDT -----  Contact: self/[phone]  Who Called:Pt  Best Call Back Number:     [phone]  Additional Information: pt stated she was scheduled for an 830 appt this morning but never got a call, pt stated her services is really bad but is outside now where she has services. Please call back to discuss.

## 2020-05-13 DIAGNOSIS — L40.50 PSORIATIC ARTHRITIS: ICD-10-CM

## 2020-05-13 DIAGNOSIS — G89.4 CHRONIC PAIN SYNDROME: ICD-10-CM

## 2020-05-13 RX ORDER — HYDROCODONE BITARTRATE AND ACETAMINOPHEN 10; 325 MG/1; MG/1
1 TABLET ORAL EVERY 8 HOURS PRN
Qty: 90 TABLET | Refills: 0 | Status: SHIPPED | OUTPATIENT
Start: 2020-05-13 | End: 2020-06-12

## 2020-05-13 NOTE — TELEPHONE ENCOUNTER
----- Message from Jaida Sheffield sent at 5/13/2020 11:26 AM CDT -----  Contact: self  Type:  RX Refill Request    Who Called:  self  Refill or New Rx:  refill  RX Name and Strength:  HYDROcodone-acetaminophen (NORCO)  mg per tablet  How is the patient currently taking it? (ex. 1XDay):  4Xday  Is this a 30 day or 90 day RX:  30  [facility] with phone number[facility]    [facility] [place] [address]  [address] [facility] [address]  Phone: [phone] Fax: [phone]  Local or Mail Order:  local  Ordering Provider:  Dr Jennifer Bang Call Back Number:  [phone] (home)   Additional Information:  Patient states the prescription is due on Saturday but her pharmacy closes at 2:00 on Saturday's. Patient will be out of medication. Please call patient if any questions. Thanks!

## 2020-05-14 ENCOUNTER — TELEPHONE (OUTPATIENT)
Dept: PHARMACY | Facility: CLINIC | Age: 60
End: 2020-05-14

## 2020-05-14 NOTE — TELEPHONE ENCOUNTER
Refill call: Patient was reached and has a bottle in and a half of medication on hand will pend out refill activity. SARAH

## 2020-06-10 DIAGNOSIS — L40.50 PSORIATIC ARTHRITIS: ICD-10-CM

## 2020-06-10 DIAGNOSIS — G89.4 CHRONIC PAIN SYNDROME: ICD-10-CM

## 2020-06-10 NOTE — TELEPHONE ENCOUNTER
----- Message from Chano Werner sent at 6/10/2020  8:28 AM CDT -----  Type:  RX Refill Request    Who Called:  Patient  Refill or New Rx:  Refill  RX Name and Strength:  HYDROcodone-acetaminophen (NORCO)  mg per tablet  How is the patient currently taking it? (ex. 1XDay): Take 1 tablet by mouth every 8 (eight) hours as needed for Pain  Is this a 30 day or 90 day RX:  90 Tablets    [facility]erred Pharm[facility] with phone number:    [facility] [place] [place] - [address]  [address] [address]  Phone: [phone] Fax: [phone]      Local or Mail Order:  Local  Ordering Provider:  Jennifer Bang Call Back Number:  [phone]  Additional Information:  Pharmacy closes at 2:00 on Saturday and is closed on Sunday.

## 2020-06-12 NOTE — TELEPHONE ENCOUNTER
----- Message from Shanthi Blackmon sent at 6/12/2020 10:48 AM CDT -----  Contact: pt  Type:  RX Refill Request    Who Called:  pt  Refill or New Rx:  refill  RX Name and Strength:  HYDROcodone-acetaminophen (NORCO)  mg per tablet 90 tablet   How is the patient currently taking it? (ex. 1XDay):  3x day  Is this a 30 day or 90 day RX:  90  [facility] with phone number[facility]    [facility] [place] [address]  [address] [facility] [address]  Phone: [phone] Fax: [phone]    Local or Mail Order:  local  Ordering Provider:  Dr Jennifer Bang Call Back Number:  [phone] (home)     Additional Information:  Pt states this is her second request, she also stated that her pharmacy close on Saturdays 2pm pls call to advise

## 2020-06-15 NOTE — TELEPHONE ENCOUNTER
Returned patient call regarding refill request. Informed that refill request has been sent to Dr Rodriguez since 6- just waiting for her to send to the pharmacy. Patient verbalized understanding.

## 2020-06-15 NOTE — TELEPHONE ENCOUNTER
----- Message from Ian Reardon sent at 6/15/2020 11:48 AM CDT -----  Regarding: Medication Refill  Type:  RX Refill Request    Who Called:  Patient  Refill or New Rx:  Refill  RX Name and Strength:  HYDROcodone-acetaminophen (NORCO)  mg per tablet  How is the patient currently taking it? (ex. 1XDay):  As ordered  Is this a 30 day or 90 day RX:  30  [facility] phone number[facility]    [facility] [address]  [address] [facility] [address]  Phone: [phone] Fax: [phone]  Local or Mail Order:  Local  Ordering Provider:  Dr. Jennifer Bang Call Back Number:  [phone]  Additional Information:  Caller would like to speak to the office as they have requested the medication since 6/10/20. Please call to advise. Thanks!

## 2020-06-16 RX ORDER — HYDROCODONE BITARTRATE AND ACETAMINOPHEN 10; 325 MG/1; MG/1
1 TABLET ORAL EVERY 8 HOURS PRN
Qty: 90 TABLET | Refills: 0 | OUTPATIENT
Start: 2020-06-16 | End: 2020-07-16

## 2020-06-16 RX ORDER — CYCLOBENZAPRINE HCL 10 MG
10 TABLET ORAL 3 TIMES DAILY PRN
Qty: 90 TABLET | Refills: 3 | Status: SHIPPED | OUTPATIENT
Start: 2020-06-16 | End: 2020-10-08 | Stop reason: SDUPTHER

## 2020-06-16 NOTE — TELEPHONE ENCOUNTER
----- Message from Amanda Serrano sent at 6/16/2020 12:12 PM CDT -----  Regarding: Earlier appointment for medication  Contact: Patient [phone]  Patient is out of medication and needs to be seen for refill request.  Patient will do a virtual visit by audio.    HYDROcodone-acetaminophen (NORCO)  mg per tablet    [address] [phone] (Phone) [phone] (Fax)

## 2020-06-16 NOTE — TELEPHONE ENCOUNTER
----- Message from Maribel William sent at 6/16/2020  9:55 AM CDT -----  Regarding: refill  Type: Rx Refill Request   Caller: Pt  Refill or New Rx: refill  Rx Name and Strength: NORCO  Is it a 30 day or 90 day? As directed  Preferred Pharmacy:   [facility] [place] - [address]  [address] [facility] [address]  Phone: [phone] Fax: [phone]   Local or Mail: local  Ordering Provider: alexus Bang Call Back Number:  [phone]  Additional Information:  Pt stated she has been calling since last week to get a refill on her RX, pharm still has not received RX. Pt requesting a call back as soon as possible.  Please advise. Thank you

## 2020-06-16 NOTE — TELEPHONE ENCOUNTER
----- Message from Shanthi Blackmon sent at 6/16/2020  4:10 PM CDT -----  Regarding: refill denied due missed appt with dr Gay  Type:  RX Refill Request    Who Called:  pt  Refill or New Rx:  refill  RX Name and Strength:  Hydrocodone-Acetaminophen  How is the patient currently taking it? (ex. 1XDay):    Is this a 30 day or 90 day RX:    Preferred Pharmacy with phone number:    Local or Mail Order:   Ordering Provider:    Best Call Back Number:  [phone] (home)     Additional Information:  pt stated she was denied refills for due to missed appt with provider Deidra, provider canceled appt due to covid 19, she states she really needs her medication for Hydrocodone-Acetaminophen .  She also stated that someone from provider office was suppose to call her back she has not her anything yet.  She stated she has been call since last week Pls call to advise

## 2020-06-16 NOTE — TELEPHONE ENCOUNTER
Spoke to pt, advised her refills are being denied as last documented visit 12/2019. Pt advises she had AV with our physician assistant 4/24/2020. Advised we would clarify visit and documentation and address her refills.

## 2020-06-16 NOTE — TELEPHONE ENCOUNTER
----- Message from Huong Hand sent at 6/16/2020  9:45 AM CDT -----  Regarding: Patient Meds  Type: Needs Medical Advice  Who Called:  Patient   Best Call Back Number: [phone]    Additional Information: patient is requesting for NORCO to be refilled but dont see meds on List. Please call back and advise

## 2020-06-17 ENCOUNTER — TELEPHONE (OUTPATIENT)
Dept: RHEUMATOLOGY | Facility: CLINIC | Age: 60
End: 2020-06-17

## 2020-06-17 NOTE — TELEPHONE ENCOUNTER
----- Message from Heidy Rm sent at 6/17/2020  4:57 PM CDT -----  Type: Needs Medical Advice  Who Called: Patient  Best Call Back Number: [phone] (home) [phone] (work)  Additional Information: The patient said she was supposed to get a call back to get her pain medicine and no one has called her she said the supervisor was supp to call Dr Rodriguez then get back with her please call to advise.

## 2020-06-17 NOTE — TELEPHONE ENCOUNTER
Returned patient call regarding refill. Nurse informed Dr Rodriguez will not be able to fill this until seen for a visit. Nurse informed Patrizia can not remember seeing her in April. Patient stated someone from the office called in April and asked how she was doing. Stated they told ok good have a nice day. Patient stated she thought that was her visit. Nurse informed that a virtual visit of telephone visit would be longer than 5 minutes. Nurse was able to get patient rescheduled to see Patrizia in July. Patient aware of date and time of appointment.

## 2020-06-17 NOTE — TELEPHONE ENCOUNTER
----- Message from Princess BONNIE Treviño sent at 6/17/2020 10:49 AM CDT -----  Contact: pt  Type:  RX Refill Request    Who Called:  patient   Refill or New Rx:  rerfill  RX Name and Strength:  HYDROcodone-acetaminophen (NORCO)  mg per tablet  How is the patient currently taking it? (ex. 1XDay):    Is this a 30 day or 90 day RX:  90  [place] Pharmacy with phone number:    [facility] [address] [facility] [place] [address]  Phone: [phone] Fax: [phone]      Local or Mail Order:  local  Ordering Provider:  Dr. Jennifer Bang Call Back Number:  [phone] (home)   Additional Information:

## 2020-07-02 ENCOUNTER — OFFICE VISIT (OUTPATIENT)
Dept: RHEUMATOLOGY | Facility: CLINIC | Age: 60
End: 2020-07-02
Payer: MEDICARE

## 2020-07-02 VITALS
HEART RATE: 97 BPM | HEIGHT: 59 IN | WEIGHT: 116 LBS | DIASTOLIC BLOOD PRESSURE: 87 MMHG | SYSTOLIC BLOOD PRESSURE: 127 MMHG | BODY MASS INDEX: 23.39 KG/M2

## 2020-07-02 DIAGNOSIS — E55.9 VITAMIN D DEFICIENCY: ICD-10-CM

## 2020-07-02 DIAGNOSIS — G89.4 CHRONIC PAIN SYNDROME: ICD-10-CM

## 2020-07-02 DIAGNOSIS — E78.5 HYPERLIPIDEMIA, UNSPECIFIED HYPERLIPIDEMIA TYPE: ICD-10-CM

## 2020-07-02 DIAGNOSIS — D84.9 IMMUNOCOMPROMISED: ICD-10-CM

## 2020-07-02 DIAGNOSIS — L40.50 PSORIATIC ARTHRITIS: Primary | ICD-10-CM

## 2020-07-02 DIAGNOSIS — J32.9 SINUSITIS, UNSPECIFIED CHRONICITY, UNSPECIFIED LOCATION: ICD-10-CM

## 2020-07-02 DIAGNOSIS — M79.7 FIBROMYALGIA: ICD-10-CM

## 2020-07-02 DIAGNOSIS — G47.00 INSOMNIA, UNSPECIFIED TYPE: ICD-10-CM

## 2020-07-02 DIAGNOSIS — F32.A DEPRESSION, UNSPECIFIED DEPRESSION TYPE: ICD-10-CM

## 2020-07-02 PROCEDURE — 99214 OFFICE O/P EST MOD 30 MIN: CPT | Mod: PBBFAC,PO,25 | Performed by: PHYSICIAN ASSISTANT

## 2020-07-02 PROCEDURE — 99214 OFFICE O/P EST MOD 30 MIN: CPT | Mod: 25,S$PBB,, | Performed by: PHYSICIAN ASSISTANT

## 2020-07-02 PROCEDURE — 99214 PR OFFICE/OUTPT VISIT, EST, LEVL IV, 30-39 MIN: ICD-10-PCS | Mod: 25,S$PBB,, | Performed by: PHYSICIAN ASSISTANT

## 2020-07-02 PROCEDURE — 99999 PR PBB SHADOW E&M-EST. PATIENT-LVL IV: ICD-10-PCS | Mod: PBBFAC,,, | Performed by: PHYSICIAN ASSISTANT

## 2020-07-02 PROCEDURE — 99999 PR PBB SHADOW E&M-EST. PATIENT-LVL IV: CPT | Mod: PBBFAC,,, | Performed by: PHYSICIAN ASSISTANT

## 2020-07-02 PROCEDURE — 96372 THER/PROPH/DIAG INJ SC/IM: CPT | Mod: PBBFAC,PO

## 2020-07-02 RX ORDER — QUETIAPINE FUMARATE 50 MG/1
150 TABLET, FILM COATED ORAL NIGHTLY
Qty: 90 TABLET | Refills: 4 | Status: SHIPPED | OUTPATIENT
Start: 2020-07-02 | End: 2020-08-12 | Stop reason: SDUPTHER

## 2020-07-02 RX ORDER — KETOROLAC TROMETHAMINE 30 MG/ML
60 INJECTION, SOLUTION INTRAMUSCULAR; INTRAVENOUS
Status: COMPLETED | OUTPATIENT
Start: 2020-07-02 | End: 2020-07-02

## 2020-07-02 RX ORDER — BUTALBITAL, ACETAMINOPHEN AND CAFFEINE 50; 325; 40 MG/1; MG/1; MG/1
1 TABLET ORAL EVERY 6 HOURS PRN
COMMUNITY
Start: 2020-06-30 | End: 2020-07-02 | Stop reason: SDUPTHER

## 2020-07-02 RX ORDER — AZITHROMYCIN 250 MG/1
TABLET, FILM COATED ORAL
Qty: 6 TABLET | Refills: 0 | Status: SHIPPED | OUTPATIENT
Start: 2020-07-02 | End: 2020-08-12

## 2020-07-02 RX ORDER — PREDNISONE 5 MG/1
10 TABLET ORAL DAILY PRN
Qty: 60 TABLET | Refills: 6 | Status: SHIPPED | OUTPATIENT
Start: 2020-07-02 | End: 2020-12-14 | Stop reason: SDUPTHER

## 2020-07-02 RX ORDER — PROMETHAZINE HYDROCHLORIDE AND DEXTROMETHORPHAN HYDROBROMIDE 6.25; 15 MG/5ML; MG/5ML
5 SYRUP ORAL EVERY 8 HOURS PRN
Qty: 118 ML | Refills: 0 | Status: SHIPPED | OUTPATIENT
Start: 2020-07-02 | End: 2020-08-12 | Stop reason: SDUPTHER

## 2020-07-02 RX ORDER — DICLOFENAC SODIUM AND MISOPROSTOL 75; 200 MG/1; UG/1
1 TABLET, DELAYED RELEASE ORAL 2 TIMES DAILY
Qty: 60 TABLET | Refills: 3 | Status: SHIPPED | OUTPATIENT
Start: 2020-07-02 | End: 2020-08-12

## 2020-07-02 RX ADMIN — KETOROLAC TROMETHAMINE 60 MG: 60 INJECTION, SOLUTION INTRAMUSCULAR at 09:07

## 2020-07-02 ASSESSMENT — ROUTINE ASSESSMENT OF PATIENT INDEX DATA (RAPID3)
TOTAL RAPID3 SCORE: 5.67
PAIN SCORE: 7
PATIENT GLOBAL ASSESSMENT SCORE: 7
MDHAQ FUNCTION SCORE: 0.9
PSYCHOLOGICAL DISTRESS SCORE: 2.2

## 2020-07-02 NOTE — PROGRESS NOTES
Administered 2 cc ( 30 mg/ml ) of toradol to the right upper outer gluteal. Informed of s/s to report verbalized understanding. No adverse reactions noted.

## 2020-07-02 NOTE — PROGRESS NOTES
Subjective:       Patient ID: Barbara Mims is a 60 y.o. female.    Chief Complaint: Disease Management and Psoriatic Arthritis    Mrs. Mims is a 60 year old female who presents to clinic for follow up on psoriatic arthritis and pain management. She has been doing poorly since her last visit. Rinvoq was started in February, but she has not taken the medications consistently due to holding for recurrent sinus infection. Today, she complains of thick-post nasal drip which is causing cough only at night x 2 weeks. No fever, chills, day time cough, or myalgias. No known covid exposure. She complains of worsening joint pain involving her hands, wrists, and neck. Pain is constant aching with morning stiffness lasting between 1-2 hours. She has psoriatic nail changes, but no skin rash. She has chronic neck pain s/p cervical surgery and is no longer followed by pain management after multiple unsuccessful interventional treatments. She is due for labs. She ran out of norco 2 weeks ago.    Current treatment:  1. Norco 10/325 TID PRN for pain  2. OTC ibuprofen 400 mg daily  3. Prednisone 10 mg daily  4. Gabapentin 300 mg nightly  5. Rinvoq (on hold)    Review of Systems   Constitutional: Positive for activity change. Negative for appetite change, chills, fatigue and fever.   Eyes: Negative for visual disturbance.   Respiratory: Negative for cough and shortness of breath.    Cardiovascular: Negative for chest pain, palpitations and leg swelling.   Gastrointestinal: Negative for abdominal pain.   Musculoskeletal: Positive for arthralgias, joint swelling, neck pain and neck stiffness.   Neurological: Negative for dizziness, weakness, light-headedness and headaches.   Psychiatric/Behavioral: Negative for dysphoric mood. The patient is not nervous/anxious.          Objective:     Vitals:    07/02/20 0847   BP: 127/87   Pulse: 97       Past Medical History:   Diagnosis Date    Alopecia     Anemia     Anxiety      Arthritis     Cardiac angina syndrome     Chronic kidney disease     kidney stones    Degenerative disc disease     Depression     Dry eyes     Dry mouth     Hyperlipidemia     Hypertension     Kidney stones     Mitral valve prolapse     Thyroid disease      Past Surgical History:   Procedure Laterality Date    CARDIAC CATHETERIZATION      CERVICAL DISC SURGERY      HYSTERECTOMY      LITHOTRIPSY            Physical Exam   Constitutional: She is well-developed, well-nourished, and in no distress.   Eyes: Right conjunctiva is not injected. Left conjunctiva is not injected. Right eye exhibits normal extraocular motion. Left eye exhibits normal extraocular motion.   Neck: No JVD present. Muscular tenderness present. No spinous process tenderness present. Decreased range of motion present. No thyromegaly present.   Cardiovascular: Normal rate and regular rhythm.  Exam reveals no decreased pulses.    Pulmonary/Chest: She has no wheezes. She has no rhonchi. She has no rales.       Right Side Rheumatological Exam     Examination finds the shoulder, elbow, wrist, knee, 4th MCP and 5th MCP normal.    The patient is tender to palpation of the 1st PIP, 1st MCP, 2nd PIP, 2nd MCP, 3rd PIP, 3rd MCP, 4th PIP and 5th PIP    The patient has an enlarged 1st PIP    Left Side Rheumatological Exam     Examination finds the shoulder, elbow, wrist, knee, 1st MCP, 2nd MCP, 3rd MCP, 4th MCP and 5th MCP normal.    The patient is tender to palpation of the 1st PIP, 2nd PIP, 3rd PIP, 4th PIP and 5th PIP.    The patient has an enlarged 1st PIP.      Back/Neck Exam   Tenderness Right paramedian tenderness of the Lower C-Spine and Upper T-Spine.Left paramedian tenderness of the Lower C-Spine and Upper T-Spine.    Neck Range of Motion   Flexion: Limited  Extension: Limited  Right Lateral Bend: abnormal  Left Lateral Bend: abnormal  Right Rotation: abnormal  Left Rotation: abnormal  Lymphadenopathy:     She has no cervical adenopathy.    Neurological: Gait normal.   Skin: No rash noted.     +psoriatic nail changes   Psychiatric: Mood and affect normal.       No new labs  Assessment:       1. Psoriatic arthritis    2. Sinusitis, unspecified chronicity, unspecified location    3. Insomnia, unspecified type    4. Hyperlipidemia, unspecified hyperlipidemia type    5. Depression, unspecified depression type    6. Fibromyalgia    7. Vitamin D deficiency    8. Chronic pain syndrome    9. Immunocompromised            Plan:       Psoriatic arthritis  -     diclofenac-misoprostol  mg-mcg (ARTHROTEC 75)  mg-mcg per tablet; Take 1 tablet by mouth 2 (two) times daily.  Dispense: 60 tablet; Refill: 3  -     predniSONE (DELTASONE) 5 MG tablet; Take 2 tablets (10 mg total) by mouth daily as needed.  Dispense: 60 tablet; Refill: 6  -     ketorolac injection 60 mg    Sinusitis, unspecified chronicity, unspecified location  -     azithromycin (Z-MEHNAZ) 250 MG tablet; Take 2 tablets by mouth on day 1; Take 1 tablet by mouth on days 2-5  Dispense: 6 tablet; Refill: 0  -     promethazine-dextromethorphan (PROMETHAZINE-DM) 6.25-15 mg/5 mL Syrp; Take 5 mLs by mouth every 8 (eight) hours as needed.  Dispense: 118 mL; Refill: 0    Insomnia, unspecified type  -     QUEtiapine (SEROQUEL) 50 MG tablet; Take 3 tablets (150 mg total) by mouth every evening.  Dispense: 90 tablet; Refill: 4    Hyperlipidemia, unspecified hyperlipidemia type    Depression, unspecified depression type    Fibromyalgia    Vitamin D deficiency    Chronic pain syndrome    Immunocompromised  -     COVID-19 Routine Screening        Assessment:  60 year old female with   Psoriatic arthritis, psoriasis, fibromyalgia on rinvoq  --chronic neck pain s/p cervical surgery, failed pain management interventions  --chronic pain syndrome on norco  --chronic insomnia on seroquel  --fibromyalgia    Plan:  1. Toradol 60 mg today  2. Hold rinvoq. Start zpack and prometh cough syrup. Covid testing ordered to  be sure since on immunocompromising medications.  3. Cont. pred 10 mg daily for now  4. Add arthrotec bid prn and stop otc ibuprofen  5. Cont. seroquel nightly  6. Cont. norco prn per Dr. Rodriguez.  I have checked [place] prescription monitoring program site and no unusual or abnormal behavior has occurred pt understand the risk and benefits of taking opioid medications and has decided to continue the medication.  7. Cont. Gabapentin, flexeril as prescribed    Follow up:   6 wks Dr. Rodriguez as scheduled

## 2020-07-08 ENCOUNTER — TELEPHONE (OUTPATIENT)
Dept: RHEUMATOLOGY | Facility: CLINIC | Age: 60
End: 2020-07-08

## 2020-07-08 NOTE — TELEPHONE ENCOUNTER
----- Message from Jaida Sheffield sent at 7/8/2020  1:05 PM CDT -----  Regarding: results  Contact: patient  Type:  Test Results    Who Called:  self  Name of Test (Lab/Mammo/Etc):  covid test  Date of Test:  07/02/20  Ordering Provider:  Dr Gay  Where the test was performed:  [facility]  Best Call Back Number:  [phone] (home) [phone] (work)  Additional Information:  Please call patient. She does not have a mychart. Thanks!

## 2020-07-10 NOTE — TELEPHONE ENCOUNTER
----- Message from Maribel William sent at 7/10/2020  8:35 AM CDT -----  Regarding: Returning Call  Contact: pt  Type: Returning Call    Who Called:  pt  Who Left the patient a message: Dorothy  Does the pt  know what this is regarding:  no  Best Call back number: [phone]  Additional information:  Please advise

## 2020-07-29 DIAGNOSIS — M79.7 FIBROMYALGIA: ICD-10-CM

## 2020-07-29 DIAGNOSIS — F41.9 ANXIETY DISORDER, UNSPECIFIED TYPE: ICD-10-CM

## 2020-07-29 DIAGNOSIS — G89.4 CHRONIC PAIN SYNDROME: ICD-10-CM

## 2020-07-29 DIAGNOSIS — R51.9 NONINTRACTABLE HEADACHE, UNSPECIFIED CHRONICITY PATTERN, UNSPECIFIED HEADACHE TYPE: ICD-10-CM

## 2020-07-29 DIAGNOSIS — F32.A DEPRESSION, UNSPECIFIED DEPRESSION TYPE: ICD-10-CM

## 2020-07-29 DIAGNOSIS — E78.5 HYPERLIPIDEMIA, UNSPECIFIED HYPERLIPIDEMIA TYPE: ICD-10-CM

## 2020-07-29 DIAGNOSIS — L40.50 PSORIATIC ARTHRITIS: ICD-10-CM

## 2020-07-29 DIAGNOSIS — F11.90 CHRONIC, CONTINUOUS USE OF OPIOIDS: ICD-10-CM

## 2020-07-29 DIAGNOSIS — K21.9 GASTROESOPHAGEAL REFLUX DISEASE, ESOPHAGITIS PRESENCE NOT SPECIFIED: ICD-10-CM

## 2020-07-29 DIAGNOSIS — G47.00 INSOMNIA, UNSPECIFIED TYPE: ICD-10-CM

## 2020-07-29 DIAGNOSIS — E55.9 VITAMIN D DEFICIENCY: ICD-10-CM

## 2020-07-29 RX ORDER — HYDROCODONE BITARTRATE AND ACETAMINOPHEN 10; 325 MG/1; MG/1
1 TABLET ORAL EVERY 8 HOURS PRN
Qty: 90 TABLET | Refills: 0 | Status: SHIPPED | OUTPATIENT
Start: 2020-07-29 | End: 2020-08-12 | Stop reason: SDUPTHER

## 2020-07-29 RX ORDER — GABAPENTIN 300 MG/1
300 CAPSULE ORAL NIGHTLY
Qty: 90 CAPSULE | Refills: 3 | Status: SHIPPED | OUTPATIENT
Start: 2020-07-29 | End: 2020-12-14 | Stop reason: SDUPTHER

## 2020-07-29 NOTE — TELEPHONE ENCOUNTER
----- Message from Huong Yazan sent at 7/29/2020  9:04 AM CDT -----  Regarding: Refill  Contact: [phone]  Type:  RX Refill Request    Who Called:  Patient   Refill or New Rx:  Refill  RX Name and Strength:  HYDROcodone-acetaminophen (NORCO)  mg per tablet  How is the patient currently taking it? (ex. 1XDay):  ...  Is this a 30 day or 90 day RX:  30 day   Preferred Pharmacy with phone number:    [facility] [place] [facility] [place], [place] [facility] [address]  [address] [address]  Phone: [phone] Fax: [phone]    Local or Mail Order:  Local   Ordering Provider:  Jennifer Bang Call Back Number:  [phone]  Additional Information:  patient stated her pharmacy closes at 2pm Saturday and close on Sunday. Patient have enough supply to last her until Friday.

## 2020-08-03 ENCOUNTER — LAB VISIT (OUTPATIENT)
Dept: LAB | Facility: HOSPITAL | Age: 60
End: 2020-08-03
Attending: INTERNAL MEDICINE
Payer: MEDICARE

## 2020-08-03 DIAGNOSIS — F11.90 CHRONIC, CONTINUOUS USE OF OPIOIDS: ICD-10-CM

## 2020-08-03 DIAGNOSIS — F41.9 ANXIETY DISORDER, UNSPECIFIED TYPE: ICD-10-CM

## 2020-08-03 DIAGNOSIS — E78.5 HYPERLIPIDEMIA, UNSPECIFIED HYPERLIPIDEMIA TYPE: ICD-10-CM

## 2020-08-03 DIAGNOSIS — F32.A DEPRESSION, UNSPECIFIED DEPRESSION TYPE: ICD-10-CM

## 2020-08-03 DIAGNOSIS — G47.00 INSOMNIA, UNSPECIFIED TYPE: ICD-10-CM

## 2020-08-03 DIAGNOSIS — M79.7 FIBROMYALGIA: ICD-10-CM

## 2020-08-03 DIAGNOSIS — L40.50 PSORIATIC ARTHRITIS: ICD-10-CM

## 2020-08-03 DIAGNOSIS — L40.8 PSORIASIS WITH PUSTULES: ICD-10-CM

## 2020-08-03 DIAGNOSIS — R79.9 ABNORMAL FINDING OF BLOOD CHEMISTRY, UNSPECIFIED: ICD-10-CM

## 2020-08-03 DIAGNOSIS — E55.9 VITAMIN D DEFICIENCY: ICD-10-CM

## 2020-08-03 DIAGNOSIS — M06.00 SERONEGATIVE RHEUMATOID ARTHRITIS: ICD-10-CM

## 2020-08-03 LAB
ALBUMIN SERPL BCP-MCNC: 3.7 G/DL (ref 3.5–5.2)
ALP SERPL-CCNC: 106 U/L (ref 55–135)
ALT SERPL W/O P-5'-P-CCNC: 12 U/L (ref 10–44)
ANION GAP SERPL CALC-SCNC: 9 MMOL/L (ref 8–16)
AST SERPL-CCNC: 16 U/L (ref 10–40)
BASOPHILS # BLD AUTO: 0.12 K/UL (ref 0–0.2)
BASOPHILS NFR BLD: 0.8 % (ref 0–1.9)
BILIRUB SERPL-MCNC: 0.1 MG/DL (ref 0.1–1)
BUN SERPL-MCNC: 24 MG/DL (ref 6–20)
CALCIUM SERPL-MCNC: 9.3 MG/DL (ref 8.7–10.5)
CHLORIDE SERPL-SCNC: 107 MMOL/L (ref 95–110)
CHOLEST SERPL-MCNC: 310 MG/DL (ref 120–199)
CHOLEST/HDLC SERPL: 6.9 {RATIO} (ref 2–5)
CO2 SERPL-SCNC: 21 MMOL/L (ref 23–29)
CREAT SERPL-MCNC: 1 MG/DL (ref 0.5–1.4)
CRP SERPL-MCNC: 119 MG/L (ref 0–8.2)
DIFFERENTIAL METHOD: ABNORMAL
EOSINOPHIL # BLD AUTO: 0.5 K/UL (ref 0–0.5)
EOSINOPHIL NFR BLD: 3.5 % (ref 0–8)
ERYTHROCYTE [DISTWIDTH] IN BLOOD BY AUTOMATED COUNT: 13.4 % (ref 11.5–14.5)
ERYTHROCYTE [SEDIMENTATION RATE] IN BLOOD BY WESTERGREN METHOD: 19 MM/HR (ref 0–20)
EST. GFR  (AFRICAN AMERICAN): >60 ML/MIN/1.73 M^2
EST. GFR  (NON AFRICAN AMERICAN): >60 ML/MIN/1.73 M^2
GLUCOSE SERPL-MCNC: 95 MG/DL (ref 70–110)
HCT VFR BLD AUTO: 37.4 % (ref 37–48.5)
HDLC SERPL-MCNC: 45 MG/DL (ref 40–75)
HDLC SERPL: 14.5 % (ref 20–50)
HGB BLD-MCNC: 11.7 G/DL (ref 12–16)
IMM GRANULOCYTES # BLD AUTO: 0.04 K/UL (ref 0–0.04)
IMM GRANULOCYTES NFR BLD AUTO: 0.3 % (ref 0–0.5)
LDLC SERPL CALC-MCNC: ABNORMAL MG/DL (ref 63–159)
LYMPHOCYTES # BLD AUTO: 3.7 K/UL (ref 1–4.8)
LYMPHOCYTES NFR BLD: 24.2 % (ref 18–48)
MCH RBC QN AUTO: 33.5 PG (ref 27–31)
MCHC RBC AUTO-ENTMCNC: 31.3 G/DL (ref 32–36)
MCV RBC AUTO: 107 FL (ref 82–98)
MONOCYTES # BLD AUTO: 1.1 K/UL (ref 0.3–1)
MONOCYTES NFR BLD: 6.9 % (ref 4–15)
NEUTROPHILS # BLD AUTO: 9.9 K/UL (ref 1.8–7.7)
NEUTROPHILS NFR BLD: 64.3 % (ref 38–73)
NONHDLC SERPL-MCNC: 265 MG/DL
NRBC BLD-RTO: 0 /100 WBC
PLATELET # BLD AUTO: 382 K/UL (ref 150–350)
PMV BLD AUTO: 9.6 FL (ref 9.2–12.9)
POTASSIUM SERPL-SCNC: 4.5 MMOL/L (ref 3.5–5.1)
PROT SERPL-MCNC: 7.5 G/DL (ref 6–8.4)
RBC # BLD AUTO: 3.49 M/UL (ref 4–5.4)
SODIUM SERPL-SCNC: 137 MMOL/L (ref 136–145)
TRIGL SERPL-MCNC: 421 MG/DL (ref 30–150)
WBC # BLD AUTO: 15.34 K/UL (ref 3.9–12.7)

## 2020-08-03 PROCEDURE — 83036 HEMOGLOBIN GLYCOSYLATED A1C: CPT

## 2020-08-03 PROCEDURE — 36415 COLL VENOUS BLD VENIPUNCTURE: CPT | Mod: PO

## 2020-08-03 PROCEDURE — 80053 COMPREHEN METABOLIC PANEL: CPT

## 2020-08-03 PROCEDURE — 80061 LIPID PANEL: CPT

## 2020-08-03 PROCEDURE — 85651 RBC SED RATE NONAUTOMATED: CPT | Mod: PO

## 2020-08-03 PROCEDURE — 85025 COMPLETE CBC W/AUTO DIFF WBC: CPT

## 2020-08-03 PROCEDURE — 86140 C-REACTIVE PROTEIN: CPT

## 2020-08-04 LAB
ESTIMATED AVG GLUCOSE: 117 MG/DL (ref 68–131)
HBA1C MFR BLD HPLC: 5.7 % (ref 4–5.6)

## 2020-08-12 ENCOUNTER — HOSPITAL ENCOUNTER (OUTPATIENT)
Dept: RADIOLOGY | Facility: HOSPITAL | Age: 60
Discharge: HOME | End: 2020-08-12
Attending: INTERNAL MEDICINE
Payer: MEDICARE

## 2020-08-12 ENCOUNTER — OFFICE VISIT (OUTPATIENT)
Dept: RHEUMATOLOGY | Facility: CLINIC | Age: 60
End: 2020-08-12
Payer: MEDICARE

## 2020-08-12 VITALS
SYSTOLIC BLOOD PRESSURE: 128 MMHG | RESPIRATION RATE: 15 BRPM | HEART RATE: 94 BPM | HEIGHT: 59 IN | WEIGHT: 116.06 LBS | OXYGEN SATURATION: 97 % | DIASTOLIC BLOOD PRESSURE: 85 MMHG | BODY MASS INDEX: 23.4 KG/M2

## 2020-08-12 DIAGNOSIS — R06.02 SHORTNESS OF BREATH: ICD-10-CM

## 2020-08-12 DIAGNOSIS — R51.9 NONINTRACTABLE HEADACHE, UNSPECIFIED CHRONICITY PATTERN, UNSPECIFIED HEADACHE TYPE: ICD-10-CM

## 2020-08-12 DIAGNOSIS — J98.9 RESPIRATORY DISORDER, UNSPECIFIED: ICD-10-CM

## 2020-08-12 DIAGNOSIS — D84.9 IMMUNOCOMPROMISED: ICD-10-CM

## 2020-08-12 DIAGNOSIS — R05.9 COUGH: ICD-10-CM

## 2020-08-12 DIAGNOSIS — G47.00 INSOMNIA, UNSPECIFIED TYPE: ICD-10-CM

## 2020-08-12 DIAGNOSIS — J32.9 SINUSITIS, UNSPECIFIED CHRONICITY, UNSPECIFIED LOCATION: ICD-10-CM

## 2020-08-12 DIAGNOSIS — U07.1 PNEUMONIA DUE TO COVID-19 VIRUS: ICD-10-CM

## 2020-08-12 DIAGNOSIS — M79.7 FIBROMYALGIA: ICD-10-CM

## 2020-08-12 DIAGNOSIS — F32.A DEPRESSION, UNSPECIFIED DEPRESSION TYPE: ICD-10-CM

## 2020-08-12 DIAGNOSIS — K21.9 GASTROESOPHAGEAL REFLUX DISEASE, ESOPHAGITIS PRESENCE NOT SPECIFIED: ICD-10-CM

## 2020-08-12 DIAGNOSIS — J18.9 PNEUMONIA, UNSPECIFIED ORGANISM: ICD-10-CM

## 2020-08-12 DIAGNOSIS — F41.9 ANXIETY: ICD-10-CM

## 2020-08-12 DIAGNOSIS — F41.9 ANXIETY DISORDER, UNSPECIFIED TYPE: ICD-10-CM

## 2020-08-12 DIAGNOSIS — L40.50 PSORIATIC ARTHRITIS: ICD-10-CM

## 2020-08-12 DIAGNOSIS — F11.90 CHRONIC, CONTINUOUS USE OF OPIOIDS: ICD-10-CM

## 2020-08-12 DIAGNOSIS — L40.8 PSORIASIS WITH PUSTULES: ICD-10-CM

## 2020-08-12 DIAGNOSIS — J11.00 PNEUMONIA AND INFLUENZA: ICD-10-CM

## 2020-08-12 DIAGNOSIS — M06.00 SERONEGATIVE RHEUMATOID ARTHRITIS: ICD-10-CM

## 2020-08-12 DIAGNOSIS — R63.4 WEIGHT LOSS: ICD-10-CM

## 2020-08-12 DIAGNOSIS — J12.82 PNEUMONIA DUE TO COVID-19 VIRUS: ICD-10-CM

## 2020-08-12 DIAGNOSIS — J11.00 PNEUMONIA AND INFLUENZA: Primary | ICD-10-CM

## 2020-08-12 DIAGNOSIS — E03.9 HYPOTHYROIDISM, UNSPECIFIED TYPE: ICD-10-CM

## 2020-08-12 DIAGNOSIS — E78.5 HYPERLIPIDEMIA, UNSPECIFIED HYPERLIPIDEMIA TYPE: ICD-10-CM

## 2020-08-12 DIAGNOSIS — I73.00 RAYNAUD'S DISEASE WITHOUT GANGRENE: ICD-10-CM

## 2020-08-12 DIAGNOSIS — E06.9 THYROIDITIS: ICD-10-CM

## 2020-08-12 DIAGNOSIS — E55.9 VITAMIN D DEFICIENCY: ICD-10-CM

## 2020-08-12 DIAGNOSIS — G89.4 CHRONIC PAIN SYNDROME: ICD-10-CM

## 2020-08-12 PROCEDURE — 71046 X-RAY EXAM CHEST 2 VIEWS: CPT | Mod: TC,FY,PO

## 2020-08-12 PROCEDURE — 71046 XR CHEST PA AND LATERAL: ICD-10-PCS | Mod: 26,,, | Performed by: RADIOLOGY

## 2020-08-12 PROCEDURE — 71046 X-RAY EXAM CHEST 2 VIEWS: CPT | Mod: 26,,, | Performed by: RADIOLOGY

## 2020-08-12 PROCEDURE — 99999 PR PBB SHADOW E&M-EST. PATIENT-LVL V: ICD-10-PCS | Mod: PBBFAC,,, | Performed by: INTERNAL MEDICINE

## 2020-08-12 PROCEDURE — 99215 PR OFFICE/OUTPT VISIT, EST, LEVL V, 40-54 MIN: ICD-10-PCS | Mod: 25,S$PBB,, | Performed by: INTERNAL MEDICINE

## 2020-08-12 PROCEDURE — 96372 THER/PROPH/DIAG INJ SC/IM: CPT | Mod: PBBFAC,PO

## 2020-08-12 PROCEDURE — 99215 OFFICE O/P EST HI 40 MIN: CPT | Mod: 25,S$PBB,, | Performed by: INTERNAL MEDICINE

## 2020-08-12 PROCEDURE — 99215 OFFICE O/P EST HI 40 MIN: CPT | Mod: PBBFAC,25,PO | Performed by: INTERNAL MEDICINE

## 2020-08-12 PROCEDURE — 99999 PR PBB SHADOW E&M-EST. PATIENT-LVL V: CPT | Mod: PBBFAC,,, | Performed by: INTERNAL MEDICINE

## 2020-08-12 RX ORDER — CYANOCOBALAMIN 1000 UG/ML
1000 INJECTION, SOLUTION INTRAMUSCULAR; SUBCUTANEOUS
Status: COMPLETED | OUTPATIENT
Start: 2020-08-12 | End: 2020-08-12

## 2020-08-12 RX ORDER — LEVOTHYROXINE SODIUM 50 UG/1
50 TABLET ORAL
Qty: 90 TABLET | Refills: 3 | Status: SHIPPED | OUTPATIENT
Start: 2020-08-12 | End: 2020-09-17 | Stop reason: SDUPTHER

## 2020-08-12 RX ORDER — HYDROCODONE BITARTRATE AND ACETAMINOPHEN 10; 325 MG/1; MG/1
1 TABLET ORAL EVERY 8 HOURS PRN
Qty: 90 TABLET | Refills: 0 | Status: SHIPPED | OUTPATIENT
Start: 2020-08-27 | End: 2020-08-12 | Stop reason: SDUPTHER

## 2020-08-12 RX ORDER — HYDROCODONE BITARTRATE AND ACETAMINOPHEN 10; 325 MG/1; MG/1
1 TABLET ORAL EVERY 8 HOURS PRN
Qty: 90 TABLET | Refills: 0 | Status: SHIPPED | OUTPATIENT
Start: 2020-09-27 | End: 2020-08-12 | Stop reason: SDUPTHER

## 2020-08-12 RX ORDER — PROMETHAZINE HYDROCHLORIDE AND DEXTROMETHORPHAN HYDROBROMIDE 6.25; 15 MG/5ML; MG/5ML
5 SYRUP ORAL EVERY 6 HOURS PRN
Qty: 473 ML | Refills: 0 | Status: SHIPPED | OUTPATIENT
Start: 2020-08-12 | End: 2020-08-22

## 2020-08-12 RX ORDER — FLUTICASONE FUROATE AND VILANTEROL TRIFENATATE 200; 25 UG/1; UG/1
1 POWDER RESPIRATORY (INHALATION) DAILY
Qty: 60 EACH | Refills: 2 | Status: SHIPPED | OUTPATIENT
Start: 2020-08-12 | End: 2020-09-11

## 2020-08-12 RX ORDER — DEXAMETHASONE 1 MG/1
1 TABLET ORAL EVERY 12 HOURS
Qty: 20 TABLET | Refills: 0 | Status: SHIPPED | OUTPATIENT
Start: 2020-08-12 | End: 2020-08-22

## 2020-08-12 RX ORDER — ALBUTEROL SULFATE 90 UG/1
2 AEROSOL, METERED RESPIRATORY (INHALATION) EVERY 6 HOURS PRN
Qty: 18 G | Refills: 0 | Status: SHIPPED | OUTPATIENT
Start: 2020-08-12 | End: 2020-12-14 | Stop reason: SDUPTHER

## 2020-08-12 RX ORDER — KETOROLAC TROMETHAMINE 30 MG/ML
60 INJECTION, SOLUTION INTRAMUSCULAR; INTRAVENOUS
Status: COMPLETED | OUTPATIENT
Start: 2020-08-12 | End: 2020-08-12

## 2020-08-12 RX ORDER — HYDROCODONE BITARTRATE AND ACETAMINOPHEN 10; 325 MG/1; MG/1
1 TABLET ORAL EVERY 8 HOURS PRN
Qty: 90 TABLET | Refills: 0 | Status: SHIPPED | OUTPATIENT
Start: 2020-10-27 | End: 2020-11-23 | Stop reason: SDUPTHER

## 2020-08-12 RX ORDER — ALBUTEROL SULFATE 0.83 MG/ML
2.5 SOLUTION RESPIRATORY (INHALATION) EVERY 6 HOURS PRN
Qty: 30 EACH | Refills: 1 | Status: SHIPPED | OUTPATIENT
Start: 2020-08-12 | End: 2021-08-12

## 2020-08-12 RX ORDER — ATORVASTATIN CALCIUM 20 MG/1
20 TABLET, FILM COATED ORAL DAILY
Qty: 90 TABLET | Refills: 3 | Status: SHIPPED | OUTPATIENT
Start: 2020-08-12 | End: 2020-10-22 | Stop reason: SDUPTHER

## 2020-08-12 RX ORDER — DEXAMETHASONE SODIUM PHOSPHATE 4 MG/ML
8 INJECTION, SOLUTION INTRA-ARTICULAR; INTRALESIONAL; INTRAMUSCULAR; INTRAVENOUS; SOFT TISSUE
Status: COMPLETED | OUTPATIENT
Start: 2020-08-12 | End: 2020-08-12

## 2020-08-12 RX ORDER — MOXIFLOXACIN HYDROCHLORIDE 400 MG/1
400 TABLET ORAL DAILY
Qty: 10 TABLET | Refills: 0 | Status: SHIPPED | OUTPATIENT
Start: 2020-08-12 | End: 2020-08-22

## 2020-08-12 RX ORDER — QUETIAPINE FUMARATE 50 MG/1
150 TABLET, FILM COATED ORAL NIGHTLY
Qty: 270 TABLET | Refills: 3 | Status: SHIPPED | OUTPATIENT
Start: 2020-08-12 | End: 2020-11-26

## 2020-08-12 RX ORDER — PAROXETINE HYDROCHLORIDE 20 MG/1
20 TABLET, FILM COATED ORAL DAILY
Qty: 90 TABLET | Refills: 3 | Status: SHIPPED | OUTPATIENT
Start: 2020-08-12 | End: 2020-11-22

## 2020-08-12 RX ORDER — LISINOPRIL 20 MG/1
20 TABLET ORAL DAILY
Qty: 90 TABLET | Refills: 3 | Status: SHIPPED | OUTPATIENT
Start: 2020-08-12 | End: 2021-08-12

## 2020-08-12 RX ADMIN — DEXAMETHASONE SODIUM PHOSPHATE 8 MG: 4 INJECTION, SOLUTION INTRAMUSCULAR; INTRAVENOUS at 10:08

## 2020-08-12 RX ADMIN — CYANOCOBALAMIN 1000 MCG: 1000 INJECTION, SOLUTION INTRAMUSCULAR at 10:08

## 2020-08-12 RX ADMIN — KETOROLAC TROMETHAMINE 60 MG: 60 INJECTION, SOLUTION INTRAMUSCULAR at 10:08

## 2020-08-12 ASSESSMENT — ROUTINE ASSESSMENT OF PATIENT INDEX DATA (RAPID3)
TOTAL RAPID3 SCORE: 6.22
FATIGUE SCORE: 1.1
PAIN SCORE: 8
MDHAQ FUNCTION SCORE: 1.1
PATIENT GLOBAL ASSESSMENT SCORE: 7
PSYCHOLOGICAL DISTRESS SCORE: 2.2

## 2020-08-12 NOTE — PROGRESS NOTES
Administered 1 cc ( 1000 mcg/ml ) of b12 to the right upper outer gluteal. Informed of s/s to report verbalized understanding. No adverse reactions noted.        Administered 2 cc dexamethasone 4mg/cc  to right upper outer gluteal. Pt tolerated well. No acute reaction noted to site. Pt instructed on S/S to report. Pt verbalized understanding.         Administered 2 cc ( 30 mg/ml ) of toradol to the left upper outer gluteal. Informed of s/s to report verbalized understanding. No adverse reactions noted.

## 2020-08-12 NOTE — PROGRESS NOTES
Subjective:       Patient ID: Barbara Mims is a 60 y.o. female.    Chief Complaint: Disease Management    Follow up: 60 year old female  psoriatic arthritis and pain management she has been ill x 1 month, now crp 100,  Wbc 15. Covid She has been doing poorly since her last visit. Rinvoq kept hold due to infection. consistently due to holding for recurrent sinus infection. Today, she complains of thick-post nasal drip which is causing cough only at night x 2 weeks. No fever, chills, day time cough, or myalgias. No known covid exposure. She complains of worsening joint pain involving her hands, wrists, and neck. Pain is constant aching with morning stiffness lasting between 1-2 hours. She has psoriatic nail changes,she has cough and  No fever    Current treatment:  1. Norco 10/325 TID PRN for pain  2. OTC ibuprofen 400 mg daily  3. Prednisone 10 mg daily  4. Gabapentin 300 mg nightly  5. Rinvoq (on hold)      Review of Systems   Constitutional: Positive for chills and fatigue. Negative for activity change, appetite change, diaphoresis, fever and unexpected weight change.   HENT: Positive for congestion and sinus pressure. Negative for dental problem, ear discharge, ear pain, facial swelling, mouth sores, nosebleeds, postnasal drip, rhinorrhea, sneezing, sore throat, tinnitus, trouble swallowing and voice change.    Eyes: Negative for photophobia, pain, discharge, redness and itching.   Respiratory: Positive for cough, chest tightness, shortness of breath and wheezing. Negative for apnea.    Cardiovascular: Positive for leg swelling. Negative for chest pain and palpitations.   Gastrointestinal: Positive for abdominal pain. Negative for abdominal distention, constipation, diarrhea, nausea and vomiting.   Endocrine: Negative for cold intolerance, heat intolerance, polydipsia and polyuria.   Genitourinary: Negative for decreased urine volume, difficulty urinating, dysuria, flank pain, frequency, hematuria and  "urgency.   Musculoskeletal: Positive for arthralgias, back pain, gait problem, joint swelling, myalgias, neck pain and neck stiffness.   Skin: Positive for rash. Negative for pallor and wound.   Allergic/Immunologic: Negative for immunocompromised state.   Neurological: Positive for weakness. Negative for dizziness, tremors, numbness and headaches.   Hematological: Negative for adenopathy. Does not bruise/bleed easily.   Psychiatric/Behavioral: Negative for sleep disturbance. The patient is not nervous/anxious.          Objective:   /85   Pulse 94   Resp 15   Ht 4' 11" (1.499 m)   Wt 52.7 kg (116 lb 1.2 oz)   SpO2 97%   BMI 23.44 kg/m²      Physical Exam   Vitals reviewed.  Constitutional: She is oriented to person, place, and time. She appears distressed.   HENT:   Head: Normocephalic and atraumatic.   Eyes: EOM are normal. Pupils are equal, round, and reactive to light. Right eye exhibits no discharge. Left eye exhibits no discharge.   Neck: Neck supple. No thyromegaly present.   Cardiovascular: Normal rate, regular rhythm and normal heart sounds.  Exam reveals no gallop and no friction rub.    No murmur heard.  Pulmonary/Chest: She has decreased breath sounds in the right upper field, the right middle field, the right lower field, the left upper field, the left middle field and the left lower field. She has wheezes in the right upper field, the right lower field, the left upper field, the left middle field and the left lower field. She has rhonchi in the right upper field, the right middle field, the right lower field, the left upper field, the left middle field and the left lower field. She has rales in the right upper field, the right middle field, the right lower field, the left upper field, the left middle field and the left lower field.   Abdominal: There is no abdominal tenderness. There is no rebound and no guarding.       Right Side Rheumatological Exam     Examination finds the elbow " normal.    The patient is tender to palpation of the shoulder, wrist, knee, 1st PIP, 1st MCP, 2nd PIP, 2nd MCP, 3rd PIP, 3rd MCP, 4th PIP, 4th MCP, 5th PIP and 5th MCP    She has swelling of the 1st PIP, 1st MCP, 2nd PIP, 2nd MCP, 3rd PIP, 3rd MCP, 4th PIP, 4th MCP, 5th PIP and 5th MCP    Shoulder Exam   Tenderness Location: no tenderness    Range of Motion   Active abduction: abnormal   Adduction: abnormal  Sensation: normal    Knee Exam   Patellofemoral Crepitus: positive  Effusion: positive  Sensation: normal    Hip Exam   Tenderness Location: posterior  Sensation: normal    Elbow/Wrist Exam   Tenderness Location: no tenderness  Sensation: normal    Left Side Rheumatological Exam     The patient is tender to palpation of the shoulder, elbow, wrist, knee, 1st PIP, 1st MCP, 2nd PIP, 2nd MCP, 3rd PIP, 3rd MCP, 4th PIP, 4th MCP, 5th PIP and 5th MCP.    She has swelling of the 1st PIP, 1st MCP, 2nd PIP, 2nd MCP, 3rd PIP, 3rd MCP, 4th PIP, 4th MCP, 5th PIP and 5th MCP    Shoulder Exam   Tenderness Location: no tenderness    Range of Motion   Active abduction: abnormal   Sensation: normal    Knee Exam     Patellofemoral Crepitus: positive  Effusion: positive  Sensation: normal    Hip Exam   Tenderness Location: posterior  Sensation: normal    Elbow/Wrist Exam   Sensation: normal      Back/Neck Exam   General Inspection   Gait: normal         Lymphadenopathy:     She has no cervical adenopathy.   Neurological: She is alert and oriented to person, place, and time. Gait normal.   Skin: Skin is dry. No rash noted. No erythema. There is pallor.     Psychiatric: Mood and affect normal.   Musculoskeletal: Tenderness present.          No data to display     Assessment:       1. Pneumonia and influenza    2. Psoriatic arthritis    3. Sinusitis, unspecified chronicity, unspecified location    4. Insomnia, unspecified type    5. Hyperlipidemia, unspecified hyperlipidemia type    6. Depression, unspecified depression type    7.  Fibromyalgia    8. Immunocompromised    9. Anxiety disorder, unspecified type    10. Pneumonia due to COVID-19 virus    11. Cough    12. Pneumonia, unspecified organism    13. Respiratory disorder, unspecified    14. Shortness of breath    15. Chronic pain syndrome    16. Nonintractable headache, unspecified chronicity pattern, unspecified headache type    17. Hypothyroidism, unspecified type    18. Raynaud's disease without gangrene    19. Thyroiditis    20. Psoriasis with pustules    21. Weight loss    22. Anxiety    23. Vitamin D deficiency    24. Chronic, continuous use of opioids    25. Gastroesophageal reflux disease, esophagitis presence not specified    26. Seronegative rheumatoid arthritis            Plan:       Barbara was seen today for disease management.    Diagnoses and all orders for this visit:    Pneumonia and influenza  -     dexamethasone injection 8 mg  -     ketorolac injection 60 mg  -     cyanocobalamin injection 1,000 mcg  -     COVID-19 (SARS CoV-2) IgG Antibody; Future  -     moxifloxacin (AVELOX) 400 mg tablet; Take 1 tablet (400 mg total) by mouth once daily. for 10 days  -     dexAMETHasone (DECADRON) 1 MG Tab; Take 1 tablet (1 mg total) by mouth every 12 (twelve) hours. for 10 days  -     albuterol (PROVENTIL) 2.5 mg /3 mL (0.083 %) nebulizer solution; Take 3 mLs (2.5 mg total) by nebulization every 6 (six) hours as needed for Wheezing. Rescue  -     fluticasone furoate-vilanteroL (BREO ELLIPTA) 200-25 mcg/dose DsDv diskus inhaler; Inhale 1 puff into the lungs once daily. Controller  -     NEBULIZER FOR HOME USE  -     albuterol (VENTOLIN HFA) 90 mcg/actuation inhaler; Inhale 2 puffs into the lungs every 6 (six) hours as needed for Wheezing. Rescue  -     promethazine-dextromethorphan (PROMETHAZINE-DM) 6.25-15 mg/5 mL Syrp; Take 5 mLs by mouth every 6 (six) hours as needed.  -     Complete PFT with bronchodilator; Future  -     PULSE OXIMETRY WITH REST - PULM; Future  -     CT Chest  Without Contrast; Future  -     Ambulatory referral/consult to Family Practice; Future  -     X-Ray Chest PA And Lateral; Future  -     X-Ray Chest PA And Lateral; Future    Psoriatic arthritis  -     dexamethasone injection 8 mg  -     ketorolac injection 60 mg  -     cyanocobalamin injection 1,000 mcg  -     COVID-19 (SARS CoV-2) IgG Antibody; Future  -     moxifloxacin (AVELOX) 400 mg tablet; Take 1 tablet (400 mg total) by mouth once daily. for 10 days  -     dexAMETHasone (DECADRON) 1 MG Tab; Take 1 tablet (1 mg total) by mouth every 12 (twelve) hours. for 10 days  -     albuterol (PROVENTIL) 2.5 mg /3 mL (0.083 %) nebulizer solution; Take 3 mLs (2.5 mg total) by nebulization every 6 (six) hours as needed for Wheezing. Rescue  -     fluticasone furoate-vilanteroL (BREO ELLIPTA) 200-25 mcg/dose DsDv diskus inhaler; Inhale 1 puff into the lungs once daily. Controller  -     albuterol (VENTOLIN HFA) 90 mcg/actuation inhaler; Inhale 2 puffs into the lungs every 6 (six) hours as needed for Wheezing. Rescue  -     promethazine-dextromethorphan (PROMETHAZINE-DM) 6.25-15 mg/5 mL Syrp; Take 5 mLs by mouth every 6 (six) hours as needed.  -     Discontinue: HYDROcodone-acetaminophen (NORCO)  mg per tablet; Take 1 tablet by mouth every 8 (eight) hours as needed for Pain.  -     Discontinue: HYDROcodone-acetaminophen (NORCO)  mg per tablet; Take 1 tablet by mouth every 8 (eight) hours as needed for Pain.  -     HYDROcodone-acetaminophen (NORCO)  mg per tablet; Take 1 tablet by mouth every 8 (eight) hours as needed for Pain.  -     paroxetine (PAXIL) 20 MG tablet; Take 1 tablet (20 mg total) by mouth once daily.  -     lisinopriL (PRINIVIL,ZESTRIL) 20 MG tablet; Take 1 tablet (20 mg total) by mouth once daily.  -     Ambulatory referral/consult to Family Practice; Future    Sinusitis, unspecified chronicity, unspecified location  -     dexamethasone injection 8 mg  -     ketorolac injection 60 mg  -      cyanocobalamin injection 1,000 mcg  -     COVID-19 (SARS CoV-2) IgG Antibody; Future  -     moxifloxacin (AVELOX) 400 mg tablet; Take 1 tablet (400 mg total) by mouth once daily. for 10 days  -     dexAMETHasone (DECADRON) 1 MG Tab; Take 1 tablet (1 mg total) by mouth every 12 (twelve) hours. for 10 days  -     albuterol (PROVENTIL) 2.5 mg /3 mL (0.083 %) nebulizer solution; Take 3 mLs (2.5 mg total) by nebulization every 6 (six) hours as needed for Wheezing. Rescue  -     fluticasone furoate-vilanteroL (BREO ELLIPTA) 200-25 mcg/dose DsDv diskus inhaler; Inhale 1 puff into the lungs once daily. Controller  -     NEBULIZER FOR HOME USE  -     albuterol (VENTOLIN HFA) 90 mcg/actuation inhaler; Inhale 2 puffs into the lungs every 6 (six) hours as needed for Wheezing. Rescue  -     promethazine-dextromethorphan (PROMETHAZINE-DM) 6.25-15 mg/5 mL Syrp; Take 5 mLs by mouth every 6 (six) hours as needed.  -     Ambulatory referral/consult to Family Practice; Future  -     X-Ray Chest PA And Lateral; Future  -     X-Ray Chest PA And Lateral; Future    Insomnia, unspecified type  -     QUEtiapine (SEROQUEL) 50 MG tablet; Take 3 tablets (150 mg total) by mouth every evening.  -     lisinopriL (PRINIVIL,ZESTRIL) 20 MG tablet; Take 1 tablet (20 mg total) by mouth once daily.  -     Ambulatory referral/consult to Family Practice; Future    Hyperlipidemia, unspecified hyperlipidemia type  -     atorvastatin (LIPITOR) 20 MG tablet; Take 1 tablet (20 mg total) by mouth once daily. At night  -     lisinopriL (PRINIVIL,ZESTRIL) 20 MG tablet; Take 1 tablet (20 mg total) by mouth once daily.  -     Ambulatory referral/consult to Family Practice; Future    Depression, unspecified depression type  -     paroxetine (PAXIL) 20 MG tablet; Take 1 tablet (20 mg total) by mouth once daily.  -     lisinopriL (PRINIVIL,ZESTRIL) 20 MG tablet; Take 1 tablet (20 mg total) by mouth once daily.    Fibromyalgia  -     paroxetine (PAXIL) 20 MG tablet;  Take 1 tablet (20 mg total) by mouth once daily.  -     lisinopriL (PRINIVIL,ZESTRIL) 20 MG tablet; Take 1 tablet (20 mg total) by mouth once daily.  -     Ambulatory referral/consult to Family Practice; Future    Immunocompromised  -     dexamethasone injection 8 mg  -     ketorolac injection 60 mg  -     cyanocobalamin injection 1,000 mcg  -     COVID-19 (SARS CoV-2) IgG Antibody; Future  -     moxifloxacin (AVELOX) 400 mg tablet; Take 1 tablet (400 mg total) by mouth once daily. for 10 days  -     dexAMETHasone (DECADRON) 1 MG Tab; Take 1 tablet (1 mg total) by mouth every 12 (twelve) hours. for 10 days  -     albuterol (PROVENTIL) 2.5 mg /3 mL (0.083 %) nebulizer solution; Take 3 mLs (2.5 mg total) by nebulization every 6 (six) hours as needed for Wheezing. Rescue  -     fluticasone furoate-vilanteroL (BREO ELLIPTA) 200-25 mcg/dose DsDv diskus inhaler; Inhale 1 puff into the lungs once daily. Controller  -     NEBULIZER FOR HOME USE  -     Ambulatory referral/consult to Family Practice; Future  -     X-Ray Chest PA And Lateral; Future  -     X-Ray Chest PA And Lateral; Future    Anxiety disorder, unspecified type  -     paroxetine (PAXIL) 20 MG tablet; Take 1 tablet (20 mg total) by mouth once daily.  -     lisinopriL (PRINIVIL,ZESTRIL) 20 MG tablet; Take 1 tablet (20 mg total) by mouth once daily.    Pneumonia due to COVID-19 virus  -     dexamethasone injection 8 mg  -     ketorolac injection 60 mg  -     cyanocobalamin injection 1,000 mcg  -     COVID-19 (SARS CoV-2) IgG Antibody; Future  -     moxifloxacin (AVELOX) 400 mg tablet; Take 1 tablet (400 mg total) by mouth once daily. for 10 days  -     dexAMETHasone (DECADRON) 1 MG Tab; Take 1 tablet (1 mg total) by mouth every 12 (twelve) hours. for 10 days  -     albuterol (PROVENTIL) 2.5 mg /3 mL (0.083 %) nebulizer solution; Take 3 mLs (2.5 mg total) by nebulization every 6 (six) hours as needed for Wheezing. Rescue  -     fluticasone furoate-vilanteroL (BREO  ELLIPTA) 200-25 mcg/dose DsDv diskus inhaler; Inhale 1 puff into the lungs once daily. Controller  -     NEBULIZER FOR HOME USE  -     Complete PFT with bronchodilator; Future  -     PULSE OXIMETRY WITH REST - PULM; Future  -     CT Chest Without Contrast; Future  -     X-Ray Chest PA And Lateral; Future  -     X-Ray Chest PA And Lateral; Future    Cough  -     Complete PFT with bronchodilator; Future  -     PULSE OXIMETRY WITH REST - PULM; Future  -     CT Chest Without Contrast; Future  -     X-Ray Chest PA And Lateral; Future  -     X-Ray Chest PA And Lateral; Future    Pneumonia, unspecified organism  -     Complete PFT with bronchodilator; Future  -     PULSE OXIMETRY WITH REST - PULM; Future  -     CT Chest Without Contrast; Future  -     X-Ray Chest PA And Lateral; Future  -     X-Ray Chest PA And Lateral; Future    Respiratory disorder, unspecified  -     Complete PFT with bronchodilator; Future  -     PULSE OXIMETRY WITH REST - PULM; Future  -     CT Chest Without Contrast; Future  -     X-Ray Chest PA And Lateral; Future  -     X-Ray Chest PA And Lateral; Future    Shortness of breath  -     Complete PFT with bronchodilator; Future  -     PULSE OXIMETRY WITH REST - PULM; Future  -     CT Chest Without Contrast; Future  -     Ambulatory referral/consult to Family Practice; Future  -     X-Ray Chest PA And Lateral; Future  -     X-Ray Chest PA And Lateral; Future    Chronic pain syndrome  -     Discontinue: HYDROcodone-acetaminophen (NORCO)  mg per tablet; Take 1 tablet by mouth every 8 (eight) hours as needed for Pain.  -     Discontinue: HYDROcodone-acetaminophen (NORCO)  mg per tablet; Take 1 tablet by mouth every 8 (eight) hours as needed for Pain.  -     HYDROcodone-acetaminophen (NORCO)  mg per tablet; Take 1 tablet by mouth every 8 (eight) hours as needed for Pain.  -     lisinopriL (PRINIVIL,ZESTRIL) 20 MG tablet; Take 1 tablet (20 mg total) by mouth once daily.  -     Ambulatory  referral/consult to Family Practice; Future    Nonintractable headache, unspecified chronicity pattern, unspecified headache type  -     Discontinue: HYDROcodone-acetaminophen (NORCO)  mg per tablet; Take 1 tablet by mouth every 8 (eight) hours as needed for Pain.  -     Discontinue: HYDROcodone-acetaminophen (NORCO)  mg per tablet; Take 1 tablet by mouth every 8 (eight) hours as needed for Pain.  -     HYDROcodone-acetaminophen (NORCO)  mg per tablet; Take 1 tablet by mouth every 8 (eight) hours as needed for Pain.  -     Ambulatory referral/consult to Family Practice; Future    Hypothyroidism, unspecified type  -     levothyroxine (SYNTHROID) 50 MCG tablet; Take 1 tablet (50 mcg total) by mouth before breakfast.    Raynaud's disease without gangrene  -     paroxetine (PAXIL) 20 MG tablet; Take 1 tablet (20 mg total) by mouth once daily.  -     lisinopriL (PRINIVIL,ZESTRIL) 20 MG tablet; Take 1 tablet (20 mg total) by mouth once daily.    Thyroiditis  -     paroxetine (PAXIL) 20 MG tablet; Take 1 tablet (20 mg total) by mouth once daily.  -     lisinopriL (PRINIVIL,ZESTRIL) 20 MG tablet; Take 1 tablet (20 mg total) by mouth once daily.    Psoriasis with pustules  -     paroxetine (PAXIL) 20 MG tablet; Take 1 tablet (20 mg total) by mouth once daily.  -     lisinopriL (PRINIVIL,ZESTRIL) 20 MG tablet; Take 1 tablet (20 mg total) by mouth once daily.    Weight loss  -     paroxetine (PAXIL) 20 MG tablet; Take 1 tablet (20 mg total) by mouth once daily.  -     lisinopriL (PRINIVIL,ZESTRIL) 20 MG tablet; Take 1 tablet (20 mg total) by mouth once daily.    Anxiety  -     paroxetine (PAXIL) 20 MG tablet; Take 1 tablet (20 mg total) by mouth once daily.  -     lisinopriL (PRINIVIL,ZESTRIL) 20 MG tablet; Take 1 tablet (20 mg total) by mouth once daily.    Vitamin D deficiency  -     lisinopriL (PRINIVIL,ZESTRIL) 20 MG tablet; Take 1 tablet (20 mg total) by mouth once daily.    Chronic, continuous use of  opioids  -     lisinopriL (PRINIVIL,ZESTRIL) 20 MG tablet; Take 1 tablet (20 mg total) by mouth once daily.    Gastroesophageal reflux disease, esophagitis presence not specified  -     lisinopriL (PRINIVIL,ZESTRIL) 20 MG tablet; Take 1 tablet (20 mg total) by mouth once daily.    Seronegative rheumatoid arthritis  -     lisinopriL (PRINIVIL,ZESTRIL) 20 MG tablet; Take 1 tablet (20 mg total) by mouth once daily.      We will check chest xray today pft ine furure as welll as  Ct of lung future. Sat 97 but diffuse wheezing and rhonchi.  Recheck chest  Xray.

## 2020-08-20 ENCOUNTER — TELEPHONE (OUTPATIENT)
Dept: RHEUMATOLOGY | Facility: CLINIC | Age: 60
End: 2020-08-20

## 2020-08-20 NOTE — TELEPHONE ENCOUNTER
----- Message from Siri Hess sent at 8/20/2020  1:27 PM CDT -----  Regarding: results  Contact: [phone]/self  Type:  Test Results    Who Called:  self  Name of Test (Lab/Mammo/Etc):  xray and labs  Date of Test:  08/12  Ordering Provider:    Where the test was performed:  [facility]  Would the patient rather a call back or a response via [facility]?  call  Best Call Back Number:  [phone]/self  Additional Information:

## 2020-08-21 NOTE — TELEPHONE ENCOUNTER
----- Message from Kaylyn Oneil sent at 8/21/2020  9:07 AM CDT -----  Type:  Patient Returning Call    Who Called:  Patient  Who Left Message for Patient: Sweta  Does the patient know what this is regarding?:  yes  Best Call Back Number:  [phone]  Additional Information:  Call to pod, no answer.

## 2020-08-21 NOTE — TELEPHONE ENCOUNTER
Spoke to pt, advised of results. She verbalized understanding to proceed with CT when finished abx. No further questions.

## 2020-08-27 ENCOUNTER — HOSPITAL ENCOUNTER (OUTPATIENT)
Dept: RADIOLOGY | Facility: HOSPITAL | Age: 60
Discharge: HOME | End: 2020-08-27
Attending: INTERNAL MEDICINE
Payer: MEDICARE

## 2020-08-27 ENCOUNTER — TELEPHONE (OUTPATIENT)
Dept: RHEUMATOLOGY | Facility: CLINIC | Age: 60
End: 2020-08-27

## 2020-08-27 DIAGNOSIS — J12.82 PNEUMONIA DUE TO COVID-19 VIRUS: ICD-10-CM

## 2020-08-27 DIAGNOSIS — R06.02 SHORTNESS OF BREATH: ICD-10-CM

## 2020-08-27 DIAGNOSIS — J98.9 RESPIRATORY DISORDER, UNSPECIFIED: ICD-10-CM

## 2020-08-27 DIAGNOSIS — U07.1 PNEUMONIA DUE TO COVID-19 VIRUS: ICD-10-CM

## 2020-08-27 DIAGNOSIS — R05.9 COUGH: ICD-10-CM

## 2020-08-27 DIAGNOSIS — D84.9 IMMUNOCOMPROMISED: ICD-10-CM

## 2020-08-27 DIAGNOSIS — J18.9 PNEUMONIA, UNSPECIFIED ORGANISM: ICD-10-CM

## 2020-08-27 DIAGNOSIS — J11.00 PNEUMONIA AND INFLUENZA: ICD-10-CM

## 2020-08-27 DIAGNOSIS — L40.50 PSORIATIC ARTHRITIS: ICD-10-CM

## 2020-08-27 DIAGNOSIS — J32.9 SINUSITIS, UNSPECIFIED CHRONICITY, UNSPECIFIED LOCATION: ICD-10-CM

## 2020-08-27 DIAGNOSIS — J11.00 PNEUMONIA AND INFLUENZA: Primary | ICD-10-CM

## 2020-08-27 PROCEDURE — 71250 CT THORAX DX C-: CPT | Mod: TC,PO

## 2020-08-27 PROCEDURE — 71250 CT THORAX DX C-: CPT | Mod: 26,,, | Performed by: RADIOLOGY

## 2020-08-27 PROCEDURE — 71046 XR CHEST PA AND LATERAL: ICD-10-PCS | Mod: 26,,, | Performed by: RADIOLOGY

## 2020-08-27 PROCEDURE — 71250 CT CHEST WITHOUT CONTRAST: ICD-10-PCS | Mod: 26,,, | Performed by: RADIOLOGY

## 2020-08-27 PROCEDURE — 71046 X-RAY EXAM CHEST 2 VIEWS: CPT | Mod: TC,FY,PO

## 2020-08-27 PROCEDURE — 71046 X-RAY EXAM CHEST 2 VIEWS: CPT | Mod: 26,,, | Performed by: RADIOLOGY

## 2020-08-27 RX ORDER — CLARITHROMYCIN 500 MG/1
500 TABLET, FILM COATED ORAL EVERY 12 HOURS
Qty: 20 TABLET | Refills: 0 | Status: SHIPPED | OUTPATIENT
Start: 2020-08-27 | End: 2020-09-06

## 2020-08-27 RX ORDER — DOXYCYCLINE 100 MG/1
100 CAPSULE ORAL 2 TIMES DAILY
Qty: 20 CAPSULE | Refills: 0 | Status: SHIPPED | OUTPATIENT
Start: 2020-08-27 | End: 2020-09-06

## 2020-08-27 RX ORDER — DEXAMETHASONE 1 MG/1
2 TABLET ORAL DAILY
Qty: 20 TABLET | Refills: 0 | Status: SHIPPED | OUTPATIENT
Start: 2020-08-27 | End: 2020-09-06

## 2020-08-27 NOTE — TELEPHONE ENCOUNTER
----- Message from Chano Werner sent at 8/27/2020 11:55 AM CDT -----  Type:  Pharmacy Calling to Clarify an RX    Name of Caller:  Kiara  Pharmacy Name:    [facility] - [facility] [address]  [id number] [address]  [facility] [address]  Phone: [phone] Fax: [phone]      Prescription Name:    Clarithromycin 500 MG   Sexamasone 1 MG    What do they need to clarify?:  Both are out of stock-needs an alternative  Best Call Back Number: [phone]  Additional Information:

## 2020-08-27 NOTE — TELEPHONE ENCOUNTER
Returned pharmacy call regarding clarification on medication needs to change to alternative medications on back order. Pharmacy stated has biaxin 250 mg tablets on hand and 4 mg decadron on hand. Nurse advised ok to change scripts to biaxin 250 mg take 2 tablets by mouth twice daily for 10 days and 4 mg decadron take half tablet daily for 10 days. Pharmacy changed scripts and will dispense.

## 2020-08-27 NOTE — TELEPHONE ENCOUNTER
Contacted patient and informed Dr Rodriguez has review CXR and advised she has left lower lobe pneumonia. Will be sending doxycycline, decadron and Biaxin to the pharmacy. Patient verbalized understanding.

## 2020-08-27 NOTE — TELEPHONE ENCOUNTER
----- Message from Jennie Morris sent at 8/27/2020  9:57 AM CDT -----  Regarding: Patient Advice  Contact: Patient  Type: Needs Medical Advice    Who Called:  Patient  Symptoms (please be specific):  cough & congestion  Pharmacy name and phone #:  [facility] - [place] - [address] [phone] (Phone)   Best Call Back Number: [phone]  Additional Information:   Patient states she was prescribed antibiotics for this, completed all 10 days but still no improvement.   Requesting phone call to discuss.

## 2020-08-27 NOTE — TELEPHONE ENCOUNTER
Returned patient call regarding symptoms. Patient stated still feels bad and still coughing. Stated coughing up a little amount of sputum that is brown in color. Nurse informed will have Dr Rodriguez review recent CXR results and call back with advisement. Patient verbalized understanding.

## 2020-09-15 ENCOUNTER — TELEPHONE (OUTPATIENT)
Dept: RHEUMATOLOGY | Facility: CLINIC | Age: 60
End: 2020-09-15

## 2020-09-15 DIAGNOSIS — J18.9 PNEUMONIA, UNSPECIFIED ORGANISM: ICD-10-CM

## 2020-09-15 DIAGNOSIS — U07.1 PNEUMONIA DUE TO COVID-19 VIRUS: ICD-10-CM

## 2020-09-15 DIAGNOSIS — J12.82 PNEUMONIA DUE TO COVID-19 VIRUS: ICD-10-CM

## 2020-09-15 DIAGNOSIS — D84.9 IMMUNOCOMPROMISED: Primary | ICD-10-CM

## 2020-09-15 DIAGNOSIS — R06.02 SHORTNESS OF BREATH: ICD-10-CM

## 2020-09-15 DIAGNOSIS — R05.9 COUGH: ICD-10-CM

## 2020-09-15 NOTE — TELEPHONE ENCOUNTER
----- Message from Kelsey Adame sent at 9/15/2020  8:29 AM CDT -----  Contact: Pt at [phone]  Type: Needs Medical Advice  Who Called:  pt  Best Call Back Number: [phone]  Additional Information:Pt is calling to get a chest xray done.Please call back and advise.

## 2020-09-15 NOTE — TELEPHONE ENCOUNTER
Returned patient call regarding cxr order. Patient stated still coughing. Completed antibiotics and still hurting in chest when coughing. Stated sputum is thick and brown in color. Went to [facility] in [place] yesterday. Was tested for COVID. Was told will not give any antibiotics until she has chest xray. Patient was told at last visit to call office after completing antibiotics to repeat chest xray. Nurse informed will send a message to have chest xray ordered. Informed once order is in will call to schedule. Verbalized understanding.

## 2020-09-16 NOTE — TELEPHONE ENCOUNTER
----- Message from Gail Wade sent at 9/16/2020  4:51 PM CDT -----  Pt called to speak with the office about the xray and medication pt can be reached at [phone]

## 2020-09-16 NOTE — TELEPHONE ENCOUNTER
Returned patient call and informed cxr orders are in the system. Patient will go tomorrow to have cxr done.

## 2020-09-17 DIAGNOSIS — E03.9 HYPOTHYROIDISM, UNSPECIFIED TYPE: ICD-10-CM

## 2020-09-17 RX ORDER — LEVOTHYROXINE SODIUM 50 UG/1
50 TABLET ORAL
Qty: 90 TABLET | Refills: 3 | Status: SHIPPED | OUTPATIENT
Start: 2020-09-17 | End: 2021-06-09 | Stop reason: SDUPTHER

## 2020-09-17 NOTE — TELEPHONE ENCOUNTER
Pharmacy requesting refill on Levothyroxine 50 MCG   Pt's LOV 08/12/2020  Pt's NOV 12/14/2020  Medication pending    RX sent to wrong pharmacy in 8/2020

## 2020-09-18 ENCOUNTER — HOSPITAL ENCOUNTER (OUTPATIENT)
Dept: RADIOLOGY | Facility: HOSPITAL | Age: 60
Discharge: HOME | End: 2020-09-18
Attending: INTERNAL MEDICINE
Payer: MEDICARE

## 2020-09-18 DIAGNOSIS — J18.9 PNEUMONIA, UNSPECIFIED ORGANISM: ICD-10-CM

## 2020-09-18 DIAGNOSIS — D84.9 IMMUNOCOMPROMISED: ICD-10-CM

## 2020-09-18 DIAGNOSIS — R05.9 COUGH: ICD-10-CM

## 2020-09-18 DIAGNOSIS — R06.02 SHORTNESS OF BREATH: ICD-10-CM

## 2020-09-18 PROCEDURE — 71046 XR CHEST PA AND LATERAL: ICD-10-PCS | Mod: 26,,, | Performed by: RADIOLOGY

## 2020-09-18 PROCEDURE — 71046 X-RAY EXAM CHEST 2 VIEWS: CPT | Mod: 26,,, | Performed by: RADIOLOGY

## 2020-09-18 PROCEDURE — 71046 X-RAY EXAM CHEST 2 VIEWS: CPT | Mod: TC,FY,PO

## 2020-09-18 NOTE — TELEPHONE ENCOUNTER
----- Message from [facility] sent at 9/18/2020  3:08 PM CDT -----  Pt called to get the results of the xray please reach out to her at [phone]

## 2020-09-18 NOTE — TELEPHONE ENCOUNTER
Dr Rodriguez spoke directly to patient regarding cxr results. Received verbal order to call in doxycycline 100 mg twice daily for 10 days and decadron 4 mg daily for 10 days to [facility].

## 2020-09-20 RX ORDER — DEXAMETHASONE 4 MG/1
4 TABLET ORAL DAILY
Qty: 10 TABLET | Refills: 0 | Status: SHIPPED | OUTPATIENT
Start: 2020-09-20 | End: 2020-09-30

## 2020-09-20 RX ORDER — DOXYCYCLINE 100 MG/1
100 CAPSULE ORAL 2 TIMES DAILY
Qty: 20 CAPSULE | Refills: 0 | Status: SHIPPED | OUTPATIENT
Start: 2020-09-20 | End: 2020-09-30

## 2020-09-24 ENCOUNTER — TELEPHONE (OUTPATIENT)
Dept: RHEUMATOLOGY | Facility: CLINIC | Age: 60
End: 2020-09-24

## 2020-09-24 NOTE — TELEPHONE ENCOUNTER
----- Message from Chano Werner sent at 9/24/2020  9:35 AM CDT -----  Type:  Pharmacy Calling to Clarify an RX    Name of Caller: Carmen  Pharmacy Name:   [facility] - [facility] [address]  [address] [address]  [facility] [address]  Phone: [phone] Fax: [phone]      Prescription Name:  HYDROcodone-acetaminophen (NORCO)  mg per tablet      What do they need to clarify?:  Start date 09/2745-[facility] is closed-Needs new start date  Best Call Back Number:  [phone]  Additional Information:

## 2020-09-24 NOTE — TELEPHONE ENCOUNTER
Spoke to pharmacy and notified them that per Dr. Rodriguez it is ok for the medication to be filled on 09/25/2020. Isis from pharmacy verbalized understanding.

## 2020-10-08 DIAGNOSIS — L40.50 PSORIATIC ARTHRITIS: ICD-10-CM

## 2020-10-08 RX ORDER — CYCLOBENZAPRINE HCL 10 MG
10 TABLET ORAL 3 TIMES DAILY PRN
Qty: 90 TABLET | Refills: 3 | Status: SHIPPED | OUTPATIENT
Start: 2020-10-08 | End: 2021-02-08

## 2020-10-08 NOTE — TELEPHONE ENCOUNTER
Pharmacy requesting refill on Cyclobenzaprine 10 mg  Pt's LOV 08/12/2020  Pt's NOV 12/14/2020  Medication pending

## 2020-10-15 DIAGNOSIS — E78.5 HYPERLIPIDEMIA, UNSPECIFIED HYPERLIPIDEMIA TYPE: ICD-10-CM

## 2020-10-15 DIAGNOSIS — F11.90 CHRONIC, CONTINUOUS USE OF OPIOIDS: ICD-10-CM

## 2020-10-15 DIAGNOSIS — G47.00 INSOMNIA, UNSPECIFIED TYPE: ICD-10-CM

## 2020-10-15 DIAGNOSIS — E55.9 VITAMIN D DEFICIENCY: ICD-10-CM

## 2020-10-15 DIAGNOSIS — G89.4 CHRONIC PAIN SYNDROME: ICD-10-CM

## 2020-10-15 DIAGNOSIS — M79.7 FIBROMYALGIA: ICD-10-CM

## 2020-10-15 DIAGNOSIS — F41.9 ANXIETY DISORDER, UNSPECIFIED TYPE: ICD-10-CM

## 2020-10-15 DIAGNOSIS — F32.A DEPRESSION, UNSPECIFIED DEPRESSION TYPE: ICD-10-CM

## 2020-10-15 DIAGNOSIS — K21.9 GASTROESOPHAGEAL REFLUX DISEASE: ICD-10-CM

## 2020-10-15 DIAGNOSIS — L40.50 PSORIATIC ARTHRITIS: ICD-10-CM

## 2020-10-15 NOTE — TELEPHONE ENCOUNTER
Pharmacy requesting refill on Omeprazole 40 mg  Pt's LOV 08/12/2020  Pt's NOV 12/14/2020  Medication pending

## 2020-10-18 RX ORDER — OMEPRAZOLE 40 MG/1
40 CAPSULE, DELAYED RELEASE ORAL DAILY
Qty: 90 CAPSULE | Refills: 3 | Status: SHIPPED | OUTPATIENT
Start: 2020-10-18 | End: 2020-10-23

## 2020-10-27 DIAGNOSIS — G89.4 CHRONIC PAIN SYNDROME: ICD-10-CM

## 2020-10-27 DIAGNOSIS — R51.9 NONINTRACTABLE HEADACHE, UNSPECIFIED CHRONICITY PATTERN, UNSPECIFIED HEADACHE TYPE: ICD-10-CM

## 2020-10-27 NOTE — TELEPHONE ENCOUNTER
----- Message from Gena Sahu sent at 10/27/2020 11:28 AM CDT -----  Regarding: rx  Contact: self  Type:  RX Refill Request    Who Called:  self  Refill or New Rx:  new rx  RX Name and Strength:  butalbital-acetaminophen-caffeine -40 mg (FIORICET, ESGIC) -40 mg per tablet  How is the patient currently taking it? (ex. 1XDay):  3 x day  Is this a 30 day or 90 day RX:  30 day supply  Preferred Pharmacy with phone number:  [facility]  Local or Mail Order:  local  Ordering Provider:  Dr Jennifer Bang Call Back Number:  [phone]  Additional Information:

## 2020-11-01 RX ORDER — BUTALBITAL, ACETAMINOPHEN AND CAFFEINE 50; 325; 40 MG/1; MG/1; MG/1
1 TABLET ORAL EVERY 6 HOURS PRN
Qty: 107 TABLET | Refills: 3 | OUTPATIENT
Start: 2020-11-01

## 2020-11-20 DIAGNOSIS — I73.00 RAYNAUD'S DISEASE WITHOUT GANGRENE: ICD-10-CM

## 2020-11-20 DIAGNOSIS — R51.9 NONINTRACTABLE HEADACHE, UNSPECIFIED CHRONICITY PATTERN, UNSPECIFIED HEADACHE TYPE: ICD-10-CM

## 2020-11-20 DIAGNOSIS — E06.9 THYROIDITIS: ICD-10-CM

## 2020-11-20 DIAGNOSIS — F32.A DEPRESSION, UNSPECIFIED DEPRESSION TYPE: ICD-10-CM

## 2020-11-20 DIAGNOSIS — R63.4 WEIGHT LOSS: ICD-10-CM

## 2020-11-20 DIAGNOSIS — L40.8 PSORIASIS WITH PUSTULES: ICD-10-CM

## 2020-11-20 DIAGNOSIS — F41.9 ANXIETY DISORDER, UNSPECIFIED TYPE: ICD-10-CM

## 2020-11-20 DIAGNOSIS — L40.50 PSORIATIC ARTHRITIS: ICD-10-CM

## 2020-11-20 DIAGNOSIS — F41.9 ANXIETY: ICD-10-CM

## 2020-11-20 DIAGNOSIS — M79.7 FIBROMYALGIA: ICD-10-CM

## 2020-11-20 DIAGNOSIS — G89.4 CHRONIC PAIN SYNDROME: ICD-10-CM

## 2020-11-22 RX ORDER — PAROXETINE HYDROCHLORIDE 20 MG/1
TABLET, FILM COATED ORAL
Qty: 30 TABLET | Refills: 2 | Status: SHIPPED | OUTPATIENT
Start: 2020-11-22 | End: 2021-01-15

## 2020-11-23 DIAGNOSIS — G47.00 INSOMNIA, UNSPECIFIED TYPE: ICD-10-CM

## 2020-11-23 RX ORDER — QUETIAPINE FUMARATE 50 MG/1
150 TABLET, FILM COATED ORAL NIGHTLY
Qty: 270 TABLET | Refills: 3 | OUTPATIENT
Start: 2020-11-23 | End: 2021-02-21

## 2020-11-23 NOTE — TELEPHONE ENCOUNTER
----- Message from Heidy Rm sent at 11/23/2020  9:37 AM CST -----  Type: Needs Medical Advice  Who Called: toshia  Pharmacy name and phone #:  [facility] Call Back Number: [phone] (home)   Additional Information: The patient needs the rx [facility] refilled today so she can  Friday thanks.  Patient stated she also needs seroquel sent to the pharm

## 2020-11-25 RX ORDER — HYDROCODONE BITARTRATE AND ACETAMINOPHEN 10; 325 MG/1; MG/1
1 TABLET ORAL EVERY 8 HOURS PRN
Qty: 90 TABLET | Refills: 0 | Status: SHIPPED | OUTPATIENT
Start: 2020-11-25 | End: 2020-12-14 | Stop reason: SDUPTHER

## 2020-12-14 ENCOUNTER — OFFICE VISIT (OUTPATIENT)
Dept: RHEUMATOLOGY | Facility: CLINIC | Age: 60
End: 2020-12-14
Payer: MEDICARE

## 2020-12-14 ENCOUNTER — LAB VISIT (OUTPATIENT)
Dept: LAB | Facility: HOSPITAL | Age: 60
End: 2020-12-14
Attending: PHYSICIAN ASSISTANT
Payer: MEDICARE

## 2020-12-14 VITALS
WEIGHT: 122.69 LBS | HEART RATE: 96 BPM | DIASTOLIC BLOOD PRESSURE: 90 MMHG | OXYGEN SATURATION: 96 % | HEIGHT: 59 IN | SYSTOLIC BLOOD PRESSURE: 140 MMHG | BODY MASS INDEX: 24.73 KG/M2

## 2020-12-14 DIAGNOSIS — J20.8 ACUTE BACTERIAL BRONCHITIS: ICD-10-CM

## 2020-12-14 DIAGNOSIS — G89.4 CHRONIC PAIN SYNDROME: ICD-10-CM

## 2020-12-14 DIAGNOSIS — R51.9 NONINTRACTABLE HEADACHE, UNSPECIFIED CHRONICITY PATTERN, UNSPECIFIED HEADACHE TYPE: ICD-10-CM

## 2020-12-14 DIAGNOSIS — F41.9 ANXIETY DISORDER, UNSPECIFIED TYPE: ICD-10-CM

## 2020-12-14 DIAGNOSIS — L40.50 PSORIATIC ARTHRITIS: ICD-10-CM

## 2020-12-14 DIAGNOSIS — E03.9 HYPOTHYROIDISM, UNSPECIFIED TYPE: ICD-10-CM

## 2020-12-14 DIAGNOSIS — D84.9 IMMUNOCOMPROMISED: ICD-10-CM

## 2020-12-14 DIAGNOSIS — E55.9 VITAMIN D DEFICIENCY: ICD-10-CM

## 2020-12-14 DIAGNOSIS — L40.50 PSORIATIC ARTHRITIS: Primary | ICD-10-CM

## 2020-12-14 DIAGNOSIS — Z79.52 CURRENT CHRONIC USE OF SYSTEMIC STEROIDS: ICD-10-CM

## 2020-12-14 DIAGNOSIS — M79.7 FIBROMYALGIA: ICD-10-CM

## 2020-12-14 DIAGNOSIS — B96.89 ACUTE BACTERIAL BRONCHITIS: ICD-10-CM

## 2020-12-14 LAB
ALBUMIN SERPL BCP-MCNC: 4 G/DL (ref 3.5–5.2)
ALBUMIN SERPL BCP-MCNC: 4 G/DL (ref 3.5–5.2)
ALP SERPL-CCNC: 77 U/L (ref 55–135)
ALP SERPL-CCNC: 77 U/L (ref 55–135)
ALT SERPL W/O P-5'-P-CCNC: 13 U/L (ref 10–44)
ALT SERPL W/O P-5'-P-CCNC: 13 U/L (ref 10–44)
ANION GAP SERPL CALC-SCNC: 12 MMOL/L (ref 8–16)
ANION GAP SERPL CALC-SCNC: 12 MMOL/L (ref 8–16)
AST SERPL-CCNC: 19 U/L (ref 10–40)
AST SERPL-CCNC: 19 U/L (ref 10–40)
BASOPHILS # BLD AUTO: 0.1 K/UL (ref 0–0.2)
BASOPHILS NFR BLD: 1.1 % (ref 0–1.9)
BILIRUB SERPL-MCNC: 0.1 MG/DL (ref 0.1–1)
BILIRUB SERPL-MCNC: 0.1 MG/DL (ref 0.1–1)
BUN SERPL-MCNC: 17 MG/DL (ref 6–20)
BUN SERPL-MCNC: 17 MG/DL (ref 6–20)
CALCIUM SERPL-MCNC: 9.7 MG/DL (ref 8.7–10.5)
CALCIUM SERPL-MCNC: 9.7 MG/DL (ref 8.7–10.5)
CHLORIDE SERPL-SCNC: 107 MMOL/L (ref 95–110)
CHLORIDE SERPL-SCNC: 107 MMOL/L (ref 95–110)
CO2 SERPL-SCNC: 20 MMOL/L (ref 23–29)
CO2 SERPL-SCNC: 20 MMOL/L (ref 23–29)
CREAT SERPL-MCNC: 0.8 MG/DL (ref 0.5–1.4)
CREAT SERPL-MCNC: 0.8 MG/DL (ref 0.5–1.4)
DIFFERENTIAL METHOD: ABNORMAL
EOSINOPHIL # BLD AUTO: 0.3 K/UL (ref 0–0.5)
EOSINOPHIL NFR BLD: 3.8 % (ref 0–8)
ERYTHROCYTE [DISTWIDTH] IN BLOOD BY AUTOMATED COUNT: 12.7 % (ref 11.5–14.5)
ERYTHROCYTE [SEDIMENTATION RATE] IN BLOOD BY WESTERGREN METHOD: 10 MM/HR (ref 0–20)
EST. GFR  (AFRICAN AMERICAN): >60 ML/MIN/1.73 M^2
EST. GFR  (AFRICAN AMERICAN): >60 ML/MIN/1.73 M^2
EST. GFR  (NON AFRICAN AMERICAN): >60 ML/MIN/1.73 M^2
EST. GFR  (NON AFRICAN AMERICAN): >60 ML/MIN/1.73 M^2
GLUCOSE SERPL-MCNC: 96 MG/DL (ref 70–110)
GLUCOSE SERPL-MCNC: 96 MG/DL (ref 70–110)
HCT VFR BLD AUTO: 38.9 % (ref 37–48.5)
HGB BLD-MCNC: 12 G/DL (ref 12–16)
IMM GRANULOCYTES # BLD AUTO: 0.03 K/UL (ref 0–0.04)
IMM GRANULOCYTES NFR BLD AUTO: 0.3 % (ref 0–0.5)
LYMPHOCYTES # BLD AUTO: 2.8 K/UL (ref 1–4.8)
LYMPHOCYTES NFR BLD: 30.8 % (ref 18–48)
MCH RBC QN AUTO: 33 PG (ref 27–31)
MCHC RBC AUTO-ENTMCNC: 30.8 G/DL (ref 32–36)
MCV RBC AUTO: 107 FL (ref 82–98)
MONOCYTES # BLD AUTO: 0.7 K/UL (ref 0.3–1)
MONOCYTES NFR BLD: 7.9 % (ref 4–15)
NEUTROPHILS # BLD AUTO: 5.1 K/UL (ref 1.8–7.7)
NEUTROPHILS NFR BLD: 56.1 % (ref 38–73)
NRBC BLD-RTO: 0 /100 WBC
PLATELET # BLD AUTO: 385 K/UL (ref 150–350)
PMV BLD AUTO: 9.4 FL (ref 9.2–12.9)
POTASSIUM SERPL-SCNC: 4.6 MMOL/L (ref 3.5–5.1)
POTASSIUM SERPL-SCNC: 4.6 MMOL/L (ref 3.5–5.1)
PROT SERPL-MCNC: 7.4 G/DL (ref 6–8.4)
PROT SERPL-MCNC: 7.4 G/DL (ref 6–8.4)
RBC # BLD AUTO: 3.64 M/UL (ref 4–5.4)
SODIUM SERPL-SCNC: 139 MMOL/L (ref 136–145)
SODIUM SERPL-SCNC: 139 MMOL/L (ref 136–145)
T4 FREE SERPL-MCNC: 0.78 NG/DL (ref 0.71–1.51)
TSH SERPL DL<=0.005 MIU/L-ACNC: 2.06 UIU/ML (ref 0.4–4)
WBC # BLD AUTO: 9 K/UL (ref 3.9–12.7)

## 2020-12-14 PROCEDURE — 84443 ASSAY THYROID STIM HORMONE: CPT

## 2020-12-14 PROCEDURE — 87340 HEPATITIS B SURFACE AG IA: CPT

## 2020-12-14 PROCEDURE — 99999 PR PBB SHADOW E&M-EST. PATIENT-LVL V: CPT | Mod: PBBFAC,,, | Performed by: PHYSICIAN ASSISTANT

## 2020-12-14 PROCEDURE — 99215 OFFICE O/P EST HI 40 MIN: CPT | Mod: PBBFAC,PO | Performed by: PHYSICIAN ASSISTANT

## 2020-12-14 PROCEDURE — 86706 HEP B SURFACE ANTIBODY: CPT

## 2020-12-14 PROCEDURE — 85651 RBC SED RATE NONAUTOMATED: CPT | Mod: PO

## 2020-12-14 PROCEDURE — 99999 PR PBB SHADOW E&M-EST. PATIENT-LVL V: ICD-10-PCS | Mod: PBBFAC,,, | Performed by: PHYSICIAN ASSISTANT

## 2020-12-14 PROCEDURE — 82306 VITAMIN D 25 HYDROXY: CPT

## 2020-12-14 PROCEDURE — 96372 THER/PROPH/DIAG INJ SC/IM: CPT | Mod: PBBFAC,PO

## 2020-12-14 PROCEDURE — 85025 COMPLETE CBC W/AUTO DIFF WBC: CPT

## 2020-12-14 PROCEDURE — 99214 OFFICE O/P EST MOD 30 MIN: CPT | Mod: 25,S$PBB,, | Performed by: PHYSICIAN ASSISTANT

## 2020-12-14 PROCEDURE — 80053 COMPREHEN METABOLIC PANEL: CPT

## 2020-12-14 PROCEDURE — 84439 ASSAY OF FREE THYROXINE: CPT

## 2020-12-14 PROCEDURE — 86803 HEPATITIS C AB TEST: CPT

## 2020-12-14 PROCEDURE — 99214 PR OFFICE/OUTPT VISIT, EST, LEVL IV, 30-39 MIN: ICD-10-PCS | Mod: 25,S$PBB,, | Performed by: PHYSICIAN ASSISTANT

## 2020-12-14 PROCEDURE — 86704 HEP B CORE ANTIBODY TOTAL: CPT

## 2020-12-14 RX ORDER — CLARITHROMYCIN 500 MG/1
500 TABLET, FILM COATED ORAL EVERY 12 HOURS
Qty: 20 TABLET | Refills: 0 | Status: SHIPPED | OUTPATIENT
Start: 2020-12-14 | End: 2020-12-24

## 2020-12-14 RX ORDER — METHYLPREDNISOLONE 4 MG/1
TABLET ORAL
Qty: 1 PACKAGE | Refills: 0 | Status: SHIPPED | OUTPATIENT
Start: 2020-12-14 | End: 2021-04-15

## 2020-12-14 RX ORDER — HYDROCODONE BITARTRATE AND ACETAMINOPHEN 10; 325 MG/1; MG/1
1 TABLET ORAL EVERY 8 HOURS PRN
Qty: 90 TABLET | Refills: 0 | Status: SHIPPED | OUTPATIENT
Start: 2020-12-24 | End: 2021-01-19 | Stop reason: SDUPTHER

## 2020-12-14 RX ORDER — BUTALBITAL, ACETAMINOPHEN AND CAFFEINE 50; 325; 40 MG/1; MG/1; MG/1
1 TABLET ORAL EVERY 6 HOURS PRN
Qty: 214 TABLET | Refills: 2 | Status: SHIPPED | OUTPATIENT
Start: 2020-12-24 | End: 2021-04-05

## 2020-12-14 RX ORDER — ALBUTEROL SULFATE 90 UG/1
2 AEROSOL, METERED RESPIRATORY (INHALATION) EVERY 6 HOURS PRN
Qty: 18 G | Refills: 0 | Status: SHIPPED | OUTPATIENT
Start: 2020-12-14 | End: 2022-09-06 | Stop reason: SDUPTHER

## 2020-12-14 RX ORDER — KETOROLAC TROMETHAMINE 30 MG/ML
60 INJECTION, SOLUTION INTRAMUSCULAR; INTRAVENOUS
Status: COMPLETED | OUTPATIENT
Start: 2020-12-14 | End: 2020-12-14

## 2020-12-14 RX ORDER — PREDNISONE 5 MG/1
10 TABLET ORAL DAILY PRN
Qty: 60 TABLET | Refills: 6 | Status: SHIPPED | OUTPATIENT
Start: 2020-12-14 | End: 2021-08-02 | Stop reason: SDUPTHER

## 2020-12-14 RX ORDER — CYANOCOBALAMIN 1000 UG/ML
1000 INJECTION, SOLUTION INTRAMUSCULAR; SUBCUTANEOUS
Status: COMPLETED | OUTPATIENT
Start: 2020-12-14 | End: 2020-12-14

## 2020-12-14 RX ORDER — GABAPENTIN 300 MG/1
600 CAPSULE ORAL NIGHTLY
Qty: 60 CAPSULE | Refills: 3 | Status: SHIPPED | OUTPATIENT
Start: 2020-12-14 | End: 2021-02-04 | Stop reason: SDUPTHER

## 2020-12-14 RX ORDER — PROMETHAZINE HYDROCHLORIDE AND DEXTROMETHORPHAN HYDROBROMIDE 6.25; 15 MG/5ML; MG/5ML
5 SYRUP ORAL EVERY 6 HOURS PRN
Qty: 473 ML | Refills: 0 | Status: SHIPPED | OUTPATIENT
Start: 2020-12-14 | End: 2020-12-24

## 2020-12-14 RX ADMIN — CYANOCOBALAMIN 1000 MCG: 1000 INJECTION, SOLUTION INTRAMUSCULAR at 09:12

## 2020-12-14 RX ADMIN — KETOROLAC TROMETHAMINE 60 MG: 30 INJECTION, SOLUTION INTRAMUSCULAR at 09:12

## 2020-12-14 ASSESSMENT — ROUTINE ASSESSMENT OF PATIENT INDEX DATA (RAPID3)
AM STIFFNESS SCORE: 1, YES
WHEN YOU AWAKENED IN THE MORNING OVER THE LAST WEEK, PLEASE INDICATE THE AMOUNT OF TIME IT TAKES UNTIL YOU ARE AS LIMBER AS YOU WILL BE FOR THE DAY: 1 HR
PATIENT GLOBAL ASSESSMENT SCORE: 7.5
PAIN SCORE: 8
PSYCHOLOGICAL DISTRESS SCORE: 4.4
FATIGUE SCORE: 1.1
MDHAQ FUNCTION SCORE: 1.1
AM STIFFNESS SCORE: 1, YES
PAIN SCORE: 8
WHEN YOU AWAKENED IN THE MORNING OVER THE LAST WEEK, PLEASE INDICATE THE AMOUNT OF TIME IT TAKES UNTIL YOU ARE AS LIMBER AS YOU WILL BE FOR THE DAY: 1 HR
TOTAL RAPID3 SCORE: 6.39

## 2020-12-14 NOTE — PROGRESS NOTES
Subjective:       Patient ID: Barbara Mims is a 60 y.o. female.    Chief Complaint: Disease Management, Pain, and Swelling    Mrs. Mims is a 60 year old female who presents to clinic for follow up on psoriatic arthritis and pain management. At her last visit in August, she was treated for left lower lobe pneumonia and she reports significant improvement with antibiotics initially; however sx returned shortly thereafter. She complains of productive cough (brown phelgm) and difficulty sleeping at night due to cough. No fever. No known covid exposure. She reports not leaving her house in months with exception of MD appt. No pulmonary established yet. She is holding rinvoq since she is ill. Arthritis has been flared involving her hands, knees, and neck. Pain is constant aching with morning stiffness lasting between 1-2 hours. She has increased burning pain in her feet at night. She has psoriatic nail changes, but no skin rash. She has chronic neck pain s/p cervical surgery and is no longer followed by pain management after multiple unsuccessful interventional treatments. She is due for labs.    Current treatment:  1. Norco 10/325 TID PRN for pain  2. OTC ibuprofen 400 mg daily  3. Prednisone 10 mg daily  4. Gabapentin 300 mg nightly  5. Rinvoq (on hold)    Review of Systems   Constitutional: Positive for activity change. Negative for appetite change, chills, fatigue and fever.   Eyes: Negative for visual disturbance.   Respiratory: Negative for shortness of breath.  Positive for cough.  Cardiovascular: Negative for chest pain, palpitations and leg swelling.   Gastrointestinal: Negative for abdominal pain.   Musculoskeletal: Positive for arthralgias, joint swelling, neck pain and neck stiffness.   Neurological: Negative for dizziness, weakness, light-headedness and headaches.   Psychiatric/Behavioral: Negative for dysphoric mood. The patient is not nervous/anxious.          Objective:     Vitals:    12/14/20  0845   BP: (!) 140/90   Pulse: 96       Past Medical History:   Diagnosis Date    Alopecia     Anemia     Anxiety     Arthritis     Cardiac angina syndrome     Chronic kidney disease     kidney stones    Degenerative disc disease     Depression     Dry eyes     Dry mouth     Hyperlipidemia     Hypertension     Kidney stones     Mitral valve prolapse     Thyroid disease      Past Surgical History:   Procedure Laterality Date    CARDIAC CATHETERIZATION      CERVICAL DISC SURGERY      HYSTERECTOMY      LITHOTRIPSY            Physical Exam   Constitutional: She is well-developed, well-nourished, and in no distress.   Eyes: Right conjunctiva is not injected. Left conjunctiva is not injected. Right eye exhibits normal extraocular motion. Left eye exhibits normal extraocular motion.   Neck: No JVD present. Muscular tenderness present. No spinous process tenderness present. Decreased range of motion present. No thyromegaly present.   Cardiovascular: Normal rate and regular rhythm.  Exam reveals no decreased pulses.    Pulmonary/Chest: She has no wheezes. She has rhonchi. She has no rales.       Right Side Rheumatological Exam     Examination finds the shoulder, elbow, wrist, knee, 4th MCP and 5th MCP normal.    The patient is tender to palpation of the 1st PIP, 1st MCP, 2nd PIP, 2nd MCP, 3rd PIP, 3rd MCP, 4th PIP and 5th PIP    The patient has an enlarged 1st PIP    Left Side Rheumatological Exam     Examination finds the shoulder, elbow, wrist, knee, 1st MCP, 2nd MCP, 3rd MCP, 4th MCP and 5th MCP normal.    The patient is tender to palpation of the 1st PIP, 2nd PIP, 3rd PIP, 4th PIP and 5th PIP.    The patient has an enlarged 1st PIP.      Back/Neck Exam    Neck Range of Motion   Flexion: Limited  Extension: Limited  Right Lateral Bend: abnormal  Left Lateral Bend: abnormal  Right Rotation: abnormal  Left Rotation: abnormal  Lymphadenopathy:     She has no cervical adenopathy.   Neurological: Gait  normal.   Skin: No rash noted.     +psoriatic nail changes   Psychiatric: Mood and affect normal.       Labs reviewed:  Component      Latest Ref Rng & Units 8/12/2020 8/3/2020   WBC      3.90 - 12.70 K/uL  15.34 (H)   RBC      4.00 - 5.40 M/uL  3.49 (L)   Hemoglobin      12.0 - 16.0 g/dL  11.7 (L)   Hematocrit      37.0 - 48.5 %  37.4   MCV      82 - 98 fL  107 (H)   MCH      27.0 - 31.0 pg  33.5 (H)   MCHC      32.0 - 36.0 g/dL  31.3 (L)   RDW      11.5 - 14.5 %  13.4   Platelets      150 - 350 K/uL  382 (H)   MPV      9.2 - 12.9 fL  9.6   Immature Granulocytes      0.0 - 0.5 %  0.3   Gran # (ANC)      1.8 - 7.7 K/uL  9.9 (H)   Immature Grans (Abs)      0.00 - 0.04 K/uL  0.04   Lymph #      1.0 - 4.8 K/uL  3.7   Mono #      0.3 - 1.0 K/uL  1.1 (H)   Eos #      0.0 - 0.5 K/uL  0.5   Baso #      0.00 - 0.20 K/uL  0.12   nRBC      0 /100 WBC  0   Gran %      38.0 - 73.0 %  64.3   Lymph %      18.0 - 48.0 %  24.2   Mono %      4.0 - 15.0 %  6.9   Eosinophil %      0.0 - 8.0 %  3.5   Basophil %      0.0 - 1.9 %  0.8   Differential Method        Automated   Sodium      136 - 145 mmol/L  137   Potassium      3.5 - 5.1 mmol/L  4.5   Chloride      95 - 110 mmol/L  107   CO2      23 - 29 mmol/L  21 (L)   Glucose      70 - 110 mg/dL  95   BUN      6 - 20 mg/dL  24 (H)   Creatinine      0.5 - 1.4 mg/dL  1.0   Calcium      8.7 - 10.5 mg/dL  9.3   PROTEIN TOTAL      6.0 - 8.4 g/dL  7.5   Albumin      3.5 - 5.2 g/dL  3.7   BILIRUBIN TOTAL      0.1 - 1.0 mg/dL  0.1   Alkaline Phosphatase      55 - 135 U/L  106   AST      10 - 40 U/L  16   ALT      10 - 44 U/L  12   Anion Gap      8 - 16 mmol/L  9   eGFR if African American      >60 mL/min/1.73 m:2  >60.0   eGFR if non African American      >60 mL/min/1.73 m:2  >60.0   Cholesterol      120 - 199 mg/dL  310 (H)   Triglycerides      30 - 150 mg/dL  421 (H)   HDL      40 - 75 mg/dL  45   LDL Cholesterol External      63.0 - 159.0 mg/dL  Invalid, Trig>400.0   HDL/Cholesterol Ratio       20.0 - 50.0 %  14.5 (L)   Total Cholesterol/HDL Ratio      2.0 - 5.0  6.9 (H)   Non-HDL Cholesterol      mg/dL  265   Hemoglobin A1C External      4.0 - 5.6 %  5.7 (H)   Estimated Avg Glucose      68 - 131 mg/dL  117   Sed Rate      0 - 20 mm/Hr  19   CRP      0.0 - 8.2 mg/L  119.0 (H)   Antibody SARS CoV-2      Negative Negative      Assessment:       1. Psoriatic arthritis    2. Acute bacterial bronchitis    3. Immunocompromised    4. Chronic pain syndrome    5. Current chronic use of systemic steroids    6. Fibromyalgia    7. Hypothyroidism, unspecified type    8. Vitamin D deficiency    9. Anxiety disorder, unspecified type            Plan:       Psoriatic arthritis  -     CBC Auto Differential; Future; Expected date: 12/14/2020  -     Comprehensive Metabolic Panel; Future; Expected date: 12/14/2020  -     Sedimentation rate; Future; Expected date: 12/14/2020  -     Comprehensive Metabolic Panel; Future; Expected date: 12/14/2020  -     Hepatitis C Antibody; Future; Expected date: 12/14/2020  -     Quantiferon Gold TB; Future; Expected date: 12/14/2020  -     Hepatitis B Core Antibody, Total; Future; Expected date: 12/14/2020  -     Hepatitis B Surface Antigen; Future; Expected date: 12/14/2020  -     HEPATITIS B SURFACE ANTIBODY; Future; Expected date: 12/14/2020  -     albuterol (VENTOLIN HFA) 90 mcg/actuation inhaler; Inhale 2 puffs into the lungs every 6 (six) hours as needed for Wheezing. Rescue  Dispense: 18 g; Refill: 0  -     gabapentin (NEURONTIN) 300 MG capsule; Take 2 capsules (600 mg total) by mouth nightly.  Dispense: 60 capsule; Refill: 3  -     ketorolac injection 60 mg  -     cyanocobalamin injection 1,000 mcg  -     predniSONE (DELTASONE) 5 MG tablet; Take 2 tablets (10 mg total) by mouth daily as needed.  Dispense: 60 tablet; Refill: 6    Acute bacterial bronchitis  -     promethazine-dextromethorphan (PROMETHAZINE-DM) 6.25-15 mg/5 mL Syrp; Take 5 mLs by mouth every 6 (six) hours as needed.   Dispense: 473 mL; Refill: 0  -     Ambulatory referral/consult to Pulmonology; Future; Expected date: 12/21/2020  -     clarithromycin (BIAXIN) 500 MG tablet; Take 1 tablet (500 mg total) by mouth every 12 (twelve) hours. for 10 days  Dispense: 20 tablet; Refill: 0  -     methylPREDNISolone (MEDROL DOSEPACK) 4 mg tablet; use as directed  Dispense: 1 Package; Refill: 0  -     Cancel: X-Ray Chest PA And Lateral; Future; Expected date: 12/14/2020    Immunocompromised  -     Ambulatory referral/consult to Pulmonology; Future; Expected date: 12/21/2020    Chronic pain syndrome  -     gabapentin (NEURONTIN) 300 MG capsule; Take 2 capsules (600 mg total) by mouth nightly.  Dispense: 60 capsule; Refill: 3    Current chronic use of systemic steroids    Fibromyalgia  -     gabapentin (NEURONTIN) 300 MG capsule; Take 2 capsules (600 mg total) by mouth nightly.  Dispense: 60 capsule; Refill: 3    Hypothyroidism, unspecified type  -     TSH; Future; Expected date: 12/14/2020  -     T4, Free; Future; Expected date: 12/14/2020    Vitamin D deficiency  -     Vitamin D; Future; Expected date: 12/14/2020    Anxiety disorder, unspecified type        Assessment:  60 year old female with   Psoriatic arthritis, psoriasis, seronegative RA, fibromyalgia on rinvoq  --chronic neck pain s/p cervical surgery, failed pain management interventions  --chronic pain syndrome on norco  --chronic insomnia on seroquel  --fibromyalgia    Plan:  1. Toradol 60 mg, b12 today  2. Hold rinvoq. Start biaxin and prometh cough syrup.   3. Hold prednisone. Start medrol dose pack. Pulmonary referral.  4. Increase gabapentin 600 mg nightly if tolerated, cont. Flexeril prn  5. Cont. seroquel nightly  6. Cont. norco prn per Dr. Rodriguez.  I have checked [place] prescription monitoring program site and no unusual or abnormal behavior has occurred pt understand the risk and benefits of taking opioid medications and has decided to continue the medication.  7. check  labs today    Follow up:   3-4 mo Dr. Jennifer hannon/labs prior

## 2020-12-14 NOTE — PATIENT INSTRUCTIONS
Hold Rinvoq until you are well    Start antibiotics and medrol dose pack    Set up appointment with Pulmonary (lung specialist)    Labs

## 2020-12-15 LAB
25(OH)D3+25(OH)D2 SERPL-MCNC: 23 NG/ML (ref 30–96)
HBV CORE AB SERPL QL IA: NEGATIVE
HBV SURFACE AB SER-ACNC: NEGATIVE M[IU]/ML
HBV SURFACE AG SERPL QL IA: NEGATIVE
HCV AB SERPL QL IA: NEGATIVE

## 2020-12-30 ENCOUNTER — DOCUMENTATION ONLY (OUTPATIENT)
Dept: RHEUMATOLOGY | Facility: CLINIC | Age: 60
End: 2020-12-30

## 2020-12-30 ENCOUNTER — PATIENT MESSAGE (OUTPATIENT)
Dept: RHEUMATOLOGY | Facility: CLINIC | Age: 60
End: 2020-12-30

## 2021-01-14 ENCOUNTER — TELEPHONE (OUTPATIENT)
Dept: RHEUMATOLOGY | Facility: CLINIC | Age: 61
End: 2021-01-14

## 2021-01-19 DIAGNOSIS — G89.4 CHRONIC PAIN SYNDROME: ICD-10-CM

## 2021-01-19 DIAGNOSIS — L40.50 PSORIATIC ARTHRITIS: ICD-10-CM

## 2021-01-19 DIAGNOSIS — R51.9 NONINTRACTABLE HEADACHE, UNSPECIFIED CHRONICITY PATTERN, UNSPECIFIED HEADACHE TYPE: ICD-10-CM

## 2021-01-21 RX ORDER — HYDROCODONE BITARTRATE AND ACETAMINOPHEN 10; 325 MG/1; MG/1
1 TABLET ORAL EVERY 8 HOURS PRN
Qty: 90 TABLET | Refills: 0 | Status: SHIPPED | OUTPATIENT
Start: 2021-01-21 | End: 2021-02-17

## 2021-02-04 DIAGNOSIS — L40.50 PSORIATIC ARTHRITIS: ICD-10-CM

## 2021-02-04 DIAGNOSIS — G89.4 CHRONIC PAIN SYNDROME: ICD-10-CM

## 2021-02-04 DIAGNOSIS — M79.7 FIBROMYALGIA: ICD-10-CM

## 2021-02-04 RX ORDER — GABAPENTIN 300 MG/1
CAPSULE ORAL
Qty: 180 CAPSULE | Refills: 1 | Status: SHIPPED | OUTPATIENT
Start: 2021-02-04 | End: 2021-04-15 | Stop reason: SDUPTHER

## 2021-02-17 ENCOUNTER — HOSPITAL ENCOUNTER (OUTPATIENT)
Dept: RADIOLOGY | Facility: HOSPITAL | Age: 61
Discharge: HOME | End: 2021-02-17
Attending: INTERNAL MEDICINE
Payer: MEDICARE

## 2021-02-17 DIAGNOSIS — G89.4 CHRONIC PAIN SYNDROME: ICD-10-CM

## 2021-02-17 DIAGNOSIS — L40.50 PSORIATIC ARTHRITIS: ICD-10-CM

## 2021-02-17 DIAGNOSIS — J18.9 PNEUMONIA DUE TO INFECTIOUS ORGANISM, UNSPECIFIED LATERALITY, UNSPECIFIED PART OF LUNG: ICD-10-CM

## 2021-02-17 DIAGNOSIS — R51.9 NONINTRACTABLE HEADACHE, UNSPECIFIED CHRONICITY PATTERN, UNSPECIFIED HEADACHE TYPE: ICD-10-CM

## 2021-02-17 DIAGNOSIS — G47.00 INSOMNIA, UNSPECIFIED TYPE: ICD-10-CM

## 2021-02-17 PROCEDURE — 71046 X-RAY EXAM CHEST 2 VIEWS: CPT | Mod: TC,FY,PO

## 2021-02-17 PROCEDURE — 71046 XR CHEST PA AND LATERAL: ICD-10-PCS | Mod: 26,,, | Performed by: RADIOLOGY

## 2021-02-17 PROCEDURE — 71046 X-RAY EXAM CHEST 2 VIEWS: CPT | Mod: 26,,, | Performed by: RADIOLOGY

## 2021-02-20 RX ORDER — HYDROCODONE BITARTRATE AND ACETAMINOPHEN 10; 325 MG/1; MG/1
1 TABLET ORAL EVERY 6 HOURS PRN
Qty: 90 TABLET | Refills: 0 | Status: SHIPPED | OUTPATIENT
Start: 2021-02-20 | End: 2021-03-17 | Stop reason: SDUPTHER

## 2021-02-20 RX ORDER — QUETIAPINE FUMARATE 50 MG/1
150 TABLET, FILM COATED ORAL NIGHTLY
Qty: 90 TABLET | Refills: 2 | Status: SHIPPED | OUTPATIENT
Start: 2021-02-20 | End: 2021-04-15 | Stop reason: SDUPTHER

## 2021-03-17 DIAGNOSIS — L40.50 PSORIATIC ARTHRITIS: ICD-10-CM

## 2021-03-17 DIAGNOSIS — G89.4 CHRONIC PAIN SYNDROME: ICD-10-CM

## 2021-03-17 DIAGNOSIS — R51.9 NONINTRACTABLE HEADACHE, UNSPECIFIED CHRONICITY PATTERN, UNSPECIFIED HEADACHE TYPE: ICD-10-CM

## 2021-03-18 RX ORDER — HYDROCODONE BITARTRATE AND ACETAMINOPHEN 10; 325 MG/1; MG/1
1 TABLET ORAL EVERY 8 HOURS PRN
Qty: 90 TABLET | Refills: 0 | Status: SHIPPED | OUTPATIENT
Start: 2021-03-18 | End: 2021-04-15 | Stop reason: SDUPTHER

## 2021-04-15 ENCOUNTER — OFFICE VISIT (OUTPATIENT)
Dept: RHEUMATOLOGY | Facility: CLINIC | Age: 61
End: 2021-04-15
Payer: MEDICARE

## 2021-04-15 VITALS
BODY MASS INDEX: 21.77 KG/M2 | HEART RATE: 94 BPM | WEIGHT: 110.88 LBS | SYSTOLIC BLOOD PRESSURE: 115 MMHG | HEIGHT: 60 IN | DIASTOLIC BLOOD PRESSURE: 79 MMHG

## 2021-04-15 DIAGNOSIS — M79.7 FIBROMYALGIA: ICD-10-CM

## 2021-04-15 DIAGNOSIS — R05.9 COUGH: ICD-10-CM

## 2021-04-15 DIAGNOSIS — G89.4 CHRONIC PAIN SYNDROME: ICD-10-CM

## 2021-04-15 DIAGNOSIS — E55.9 VITAMIN D DEFICIENCY: ICD-10-CM

## 2021-04-15 DIAGNOSIS — R51.9 NONINTRACTABLE HEADACHE, UNSPECIFIED CHRONICITY PATTERN, UNSPECIFIED HEADACHE TYPE: ICD-10-CM

## 2021-04-15 DIAGNOSIS — L40.50 PSORIATIC ARTHRITIS: ICD-10-CM

## 2021-04-15 DIAGNOSIS — L40.50 PSORIATIC ARTHRITIS: Primary | ICD-10-CM

## 2021-04-15 DIAGNOSIS — K21.9 GASTROESOPHAGEAL REFLUX DISEASE WITHOUT ESOPHAGITIS: ICD-10-CM

## 2021-04-15 DIAGNOSIS — M06.00 SERONEGATIVE RHEUMATOID ARTHRITIS: ICD-10-CM

## 2021-04-15 DIAGNOSIS — F41.9 ANXIETY: ICD-10-CM

## 2021-04-15 DIAGNOSIS — G47.00 INSOMNIA, UNSPECIFIED TYPE: ICD-10-CM

## 2021-04-15 DIAGNOSIS — D84.9 IMMUNOCOMPROMISED: ICD-10-CM

## 2021-04-15 PROCEDURE — 96372 THER/PROPH/DIAG INJ SC/IM: CPT | Mod: PBBFAC,PO

## 2021-04-15 PROCEDURE — 99999 PR PBB SHADOW E&M-EST. PATIENT-LVL V: ICD-10-PCS | Mod: PBBFAC,,, | Performed by: PHYSICIAN ASSISTANT

## 2021-04-15 PROCEDURE — 99214 OFFICE O/P EST MOD 30 MIN: CPT | Mod: 25,S$PBB,, | Performed by: PHYSICIAN ASSISTANT

## 2021-04-15 PROCEDURE — 99215 OFFICE O/P EST HI 40 MIN: CPT | Mod: PBBFAC,PO | Performed by: PHYSICIAN ASSISTANT

## 2021-04-15 PROCEDURE — 99214 PR OFFICE/OUTPT VISIT, EST, LEVL IV, 30-39 MIN: ICD-10-PCS | Mod: 25,S$PBB,, | Performed by: PHYSICIAN ASSISTANT

## 2021-04-15 PROCEDURE — 99999 PR PBB SHADOW E&M-EST. PATIENT-LVL V: CPT | Mod: PBBFAC,,, | Performed by: PHYSICIAN ASSISTANT

## 2021-04-15 RX ORDER — PREDNISONE 20 MG/1
20 TABLET ORAL DAILY
Qty: 5 TABLET | Refills: 0 | Status: SHIPPED | OUTPATIENT
Start: 2021-04-15 | End: 2021-04-20

## 2021-04-15 RX ORDER — AMOXICILLIN AND CLAVULANATE POTASSIUM 875; 125 MG/1; MG/1
1 TABLET, FILM COATED ORAL 2 TIMES DAILY
Qty: 20 TABLET | Refills: 0 | Status: SHIPPED | OUTPATIENT
Start: 2021-04-15 | End: 2021-04-25

## 2021-04-15 RX ORDER — DEXAMETHASONE SODIUM PHOSPHATE 4 MG/ML
8 INJECTION, SOLUTION INTRA-ARTICULAR; INTRALESIONAL; INTRAMUSCULAR; INTRAVENOUS; SOFT TISSUE
Status: COMPLETED | OUTPATIENT
Start: 2021-04-15 | End: 2021-04-15

## 2021-04-15 RX ORDER — GABAPENTIN 300 MG/1
600 CAPSULE ORAL 2 TIMES DAILY
Qty: 120 CAPSULE | Refills: 3 | Status: SHIPPED | OUTPATIENT
Start: 2021-04-15 | End: 2021-08-20 | Stop reason: SDUPTHER

## 2021-04-15 RX ORDER — CYANOCOBALAMIN 1000 UG/ML
1000 INJECTION, SOLUTION INTRAMUSCULAR; SUBCUTANEOUS
Status: COMPLETED | OUTPATIENT
Start: 2021-04-15 | End: 2021-04-15

## 2021-04-15 RX ORDER — QUETIAPINE FUMARATE 50 MG/1
150 TABLET, FILM COATED ORAL NIGHTLY
Qty: 90 TABLET | Refills: 5 | Status: SHIPPED | OUTPATIENT
Start: 2021-04-15 | End: 2022-05-10

## 2021-04-15 RX ORDER — ERGOCALCIFEROL 1.25 MG/1
50000 CAPSULE ORAL
Qty: 4 CAPSULE | Refills: 6 | Status: SHIPPED | OUTPATIENT
Start: 2021-04-15 | End: 2021-08-26 | Stop reason: SDUPTHER

## 2021-04-15 RX ORDER — CYCLOBENZAPRINE HCL 10 MG
10 TABLET ORAL 3 TIMES DAILY PRN
Qty: 90 TABLET | Refills: 3 | Status: SHIPPED | OUTPATIENT
Start: 2021-04-15 | End: 2021-12-08 | Stop reason: SDUPTHER

## 2021-04-15 RX ORDER — PAROXETINE HYDROCHLORIDE 20 MG/1
20 TABLET, FILM COATED ORAL DAILY
Qty: 30 TABLET | Refills: 5 | Status: SHIPPED | OUTPATIENT
Start: 2021-04-15 | End: 2021-08-26

## 2021-04-15 RX ORDER — KETOROLAC TROMETHAMINE 30 MG/ML
60 INJECTION, SOLUTION INTRAMUSCULAR; INTRAVENOUS
Status: COMPLETED | OUTPATIENT
Start: 2021-04-15 | End: 2021-04-15

## 2021-04-15 RX ORDER — OMEPRAZOLE 40 MG/1
40 CAPSULE, DELAYED RELEASE ORAL DAILY
Qty: 90 CAPSULE | Refills: 1 | Status: SHIPPED | OUTPATIENT
Start: 2021-04-15 | End: 2021-05-06 | Stop reason: SDUPTHER

## 2021-04-15 RX ORDER — PROMETHAZINE HYDROCHLORIDE AND DEXTROMETHORPHAN HYDROBROMIDE 6.25; 15 MG/5ML; MG/5ML
5 SYRUP ORAL EVERY 6 HOURS PRN
Qty: 473 ML | Refills: 1 | Status: SHIPPED | OUTPATIENT
Start: 2021-04-15 | End: 2021-04-25

## 2021-04-15 RX ADMIN — KETOROLAC TROMETHAMINE 60 MG: 60 INJECTION, SOLUTION INTRAMUSCULAR at 02:04

## 2021-04-15 RX ADMIN — CYANOCOBALAMIN 1000 MCG: 1000 INJECTION, SOLUTION INTRAMUSCULAR at 02:04

## 2021-04-15 RX ADMIN — DEXAMETHASONE SODIUM PHOSPHATE 8 MG: 4 INJECTION INTRA-ARTICULAR; INTRALESIONAL; INTRAMUSCULAR; INTRAVENOUS; SOFT TISSUE at 02:04

## 2021-04-16 RX ORDER — BUTALBITAL, ACETAMINOPHEN AND CAFFEINE 50; 325; 40 MG/1; MG/1; MG/1
1 TABLET ORAL EVERY 6 HOURS PRN
Qty: 120 TABLET | Refills: 2 | Status: SHIPPED | OUTPATIENT
Start: 2021-04-16 | End: 2021-11-03

## 2021-04-16 RX ORDER — HYDROCODONE BITARTRATE AND ACETAMINOPHEN 10; 325 MG/1; MG/1
1 TABLET ORAL EVERY 8 HOURS PRN
Qty: 90 TABLET | Refills: 0 | Status: SHIPPED | OUTPATIENT
Start: 2021-04-21 | End: 2021-05-18

## 2021-04-20 DIAGNOSIS — R06.02 SHORTNESS OF BREATH: ICD-10-CM

## 2021-04-21 RX ORDER — FLUTICASONE FUROATE AND VILANTEROL TRIFENATATE 200; 25 UG/1; UG/1
1 POWDER RESPIRATORY (INHALATION) DAILY
Qty: 30 EACH | Refills: 5 | Status: SHIPPED | OUTPATIENT
Start: 2021-04-21 | End: 2021-05-13 | Stop reason: SDUPTHER

## 2021-05-06 DIAGNOSIS — K21.9 GASTROESOPHAGEAL REFLUX DISEASE WITHOUT ESOPHAGITIS: ICD-10-CM

## 2021-05-06 RX ORDER — OMEPRAZOLE 40 MG/1
40 CAPSULE, DELAYED RELEASE ORAL DAILY
Qty: 90 CAPSULE | Refills: 1 | Status: SHIPPED | OUTPATIENT
Start: 2021-05-15 | End: 2021-11-10 | Stop reason: SDUPTHER

## 2021-05-10 ENCOUNTER — PATIENT MESSAGE (OUTPATIENT)
Dept: RESEARCH | Facility: HOSPITAL | Age: 61
End: 2021-05-10

## 2021-05-13 DIAGNOSIS — R06.02 SHORTNESS OF BREATH: ICD-10-CM

## 2021-05-13 RX ORDER — FLUTICASONE FUROATE AND VILANTEROL TRIFENATATE 200; 25 UG/1; UG/1
1 POWDER RESPIRATORY (INHALATION) DAILY
Qty: 90 EACH | Refills: 1 | Status: SHIPPED | OUTPATIENT
Start: 2021-05-13 | End: 2021-08-11

## 2021-05-18 DIAGNOSIS — G89.4 CHRONIC PAIN SYNDROME: ICD-10-CM

## 2021-05-18 DIAGNOSIS — R51.9 NONINTRACTABLE HEADACHE, UNSPECIFIED CHRONICITY PATTERN, UNSPECIFIED HEADACHE TYPE: ICD-10-CM

## 2021-05-18 DIAGNOSIS — L40.50 PSORIATIC ARTHRITIS: ICD-10-CM

## 2021-05-18 RX ORDER — HYDROCODONE BITARTRATE AND ACETAMINOPHEN 10; 325 MG/1; MG/1
1 TABLET ORAL EVERY 8 HOURS PRN
Qty: 90 TABLET | Refills: 0 | Status: SHIPPED | OUTPATIENT
Start: 2021-05-18 | End: 2021-06-19

## 2021-06-09 DIAGNOSIS — E03.9 HYPOTHYROIDISM, UNSPECIFIED TYPE: ICD-10-CM

## 2021-06-09 RX ORDER — LEVOTHYROXINE SODIUM 50 UG/1
50 TABLET ORAL
Qty: 90 TABLET | Refills: 3 | Status: SHIPPED | OUTPATIENT
Start: 2021-06-09 | End: 2021-12-23 | Stop reason: SDUPTHER

## 2021-06-16 DIAGNOSIS — R51.9 NONINTRACTABLE HEADACHE, UNSPECIFIED CHRONICITY PATTERN, UNSPECIFIED HEADACHE TYPE: ICD-10-CM

## 2021-06-16 DIAGNOSIS — G89.4 CHRONIC PAIN SYNDROME: ICD-10-CM

## 2021-06-16 DIAGNOSIS — L40.50 PSORIATIC ARTHRITIS: ICD-10-CM

## 2021-06-19 RX ORDER — HYDROCODONE BITARTRATE AND ACETAMINOPHEN 10; 325 MG/1; MG/1
1 TABLET ORAL EVERY 8 HOURS PRN
Qty: 90 TABLET | Refills: 0 | Status: SHIPPED | OUTPATIENT
Start: 2021-06-19 | End: 2021-07-23

## 2021-06-21 ENCOUNTER — TELEPHONE (OUTPATIENT)
Dept: RHEUMATOLOGY | Facility: CLINIC | Age: 61
End: 2021-06-21

## 2021-07-29 DIAGNOSIS — R05.9 COUGH: Primary | ICD-10-CM

## 2021-08-02 DIAGNOSIS — L40.50 PSORIATIC ARTHRITIS: ICD-10-CM

## 2021-08-02 RX ORDER — PROMETHAZINE HYDROCHLORIDE AND DEXTROMETHORPHAN HYDROBROMIDE 6.25; 15 MG/5ML; MG/5ML
5 SYRUP ORAL EVERY 6 HOURS PRN
Qty: 240 ML | Refills: 0 | Status: SHIPPED | OUTPATIENT
Start: 2021-08-02 | End: 2021-08-18

## 2021-08-02 RX ORDER — PREDNISONE 5 MG/1
10 TABLET ORAL DAILY PRN
Qty: 60 TABLET | Refills: 6 | Status: SHIPPED | OUTPATIENT
Start: 2021-08-02 | End: 2021-08-26 | Stop reason: SDUPTHER

## 2021-08-20 DIAGNOSIS — L40.50 PSORIATIC ARTHRITIS: ICD-10-CM

## 2021-08-20 DIAGNOSIS — M06.00 SERONEGATIVE RHEUMATOID ARTHRITIS: ICD-10-CM

## 2021-08-20 RX ORDER — GABAPENTIN 300 MG/1
600 CAPSULE ORAL 2 TIMES DAILY
Qty: 360 CAPSULE | Refills: 3 | Status: SHIPPED | OUTPATIENT
Start: 2021-08-20 | End: 2022-04-11 | Stop reason: SDUPTHER

## 2021-08-26 ENCOUNTER — OFFICE VISIT (OUTPATIENT)
Dept: RHEUMATOLOGY | Facility: CLINIC | Age: 61
End: 2021-08-26
Payer: MEDICARE

## 2021-08-26 VITALS — BODY MASS INDEX: 22.38 KG/M2 | WEIGHT: 114 LBS | HEART RATE: 90 BPM | HEIGHT: 60 IN

## 2021-08-26 DIAGNOSIS — R51.9 NONINTRACTABLE HEADACHE, UNSPECIFIED CHRONICITY PATTERN, UNSPECIFIED HEADACHE TYPE: ICD-10-CM

## 2021-08-26 DIAGNOSIS — D84.9 IMMUNOCOMPROMISED: ICD-10-CM

## 2021-08-26 DIAGNOSIS — M17.0 PRIMARY OSTEOARTHRITIS OF BOTH KNEES: ICD-10-CM

## 2021-08-26 DIAGNOSIS — G89.4 CHRONIC PAIN SYNDROME: ICD-10-CM

## 2021-08-26 DIAGNOSIS — R05.9 COUGH: ICD-10-CM

## 2021-08-26 DIAGNOSIS — M79.7 FIBROMYALGIA: ICD-10-CM

## 2021-08-26 DIAGNOSIS — I10 HYPERTENSION, UNSPECIFIED TYPE: ICD-10-CM

## 2021-08-26 DIAGNOSIS — B96.89 ACUTE BACTERIAL BRONCHITIS: ICD-10-CM

## 2021-08-26 DIAGNOSIS — J20.8 ACUTE BACTERIAL BRONCHITIS: ICD-10-CM

## 2021-08-26 DIAGNOSIS — E55.9 VITAMIN D DEFICIENCY: ICD-10-CM

## 2021-08-26 DIAGNOSIS — M06.00 SERONEGATIVE RHEUMATOID ARTHRITIS: ICD-10-CM

## 2021-08-26 DIAGNOSIS — L40.50 PSORIATIC ARTHRITIS: Primary | ICD-10-CM

## 2021-08-26 PROCEDURE — 99999 PR PBB SHADOW E&M-EST. PATIENT-LVL III: CPT | Mod: PBBFAC,,, | Performed by: INTERNAL MEDICINE

## 2021-08-26 PROCEDURE — 99215 OFFICE O/P EST HI 40 MIN: CPT | Mod: 25,S$PBB,, | Performed by: INTERNAL MEDICINE

## 2021-08-26 PROCEDURE — 99215 PR OFFICE/OUTPT VISIT, EST, LEVL V, 40-54 MIN: ICD-10-PCS | Mod: 25,S$PBB,, | Performed by: INTERNAL MEDICINE

## 2021-08-26 PROCEDURE — 96372 THER/PROPH/DIAG INJ SC/IM: CPT | Mod: PBBFAC,PN

## 2021-08-26 PROCEDURE — 99999 PR PBB SHADOW E&M-EST. PATIENT-LVL III: ICD-10-PCS | Mod: PBBFAC,,, | Performed by: INTERNAL MEDICINE

## 2021-08-26 PROCEDURE — 99213 OFFICE O/P EST LOW 20 MIN: CPT | Mod: PBBFAC,PN,25 | Performed by: INTERNAL MEDICINE

## 2021-08-26 RX ORDER — LIDOCAINE AND PRILOCAINE 25; 25 MG/G; MG/G
CREAM TOPICAL
Qty: 30 G | Refills: 3 | Status: SHIPPED | OUTPATIENT
Start: 2021-08-26 | End: 2023-03-17

## 2021-08-26 RX ORDER — IMIPRAMINE HYDROCHLORIDE 50 MG/1
50 TABLET, FILM COATED ORAL NIGHTLY
Qty: 30 TABLET | Refills: 11 | Status: SHIPPED | OUTPATIENT
Start: 2021-08-26 | End: 2022-04-26

## 2021-08-26 RX ORDER — CYANOCOBALAMIN 1000 UG/ML
1000 INJECTION, SOLUTION INTRAMUSCULAR; SUBCUTANEOUS
Status: COMPLETED | OUTPATIENT
Start: 2021-08-26 | End: 2021-08-26

## 2021-08-26 RX ORDER — HYDROCODONE BITARTRATE AND ACETAMINOPHEN 10; 325 MG/1; MG/1
1 TABLET ORAL EVERY 8 HOURS PRN
Qty: 90 TABLET | Refills: 0 | Status: SHIPPED | OUTPATIENT
Start: 2021-09-20 | End: 2021-08-26 | Stop reason: SDUPTHER

## 2021-08-26 RX ORDER — HYDROXYCHLOROQUINE SULFATE 200 MG/1
200 TABLET, FILM COATED ORAL DAILY
Qty: 30 TABLET | Refills: 3 | Status: SHIPPED | OUTPATIENT
Start: 2021-08-26 | End: 2021-11-26

## 2021-08-26 RX ORDER — DOXYCYCLINE HYCLATE 100 MG/1
100 TABLET, DELAYED RELEASE ORAL 2 TIMES DAILY
Qty: 40 TABLET | Refills: 0 | Status: SHIPPED | OUTPATIENT
Start: 2021-08-26 | End: 2021-09-15

## 2021-08-26 RX ORDER — PREDNISONE 5 MG/1
15 TABLET ORAL DAILY PRN
Qty: 90 TABLET | Refills: 6 | Status: SHIPPED | OUTPATIENT
Start: 2021-08-26 | End: 2022-05-24

## 2021-08-26 RX ORDER — DICLOFENAC SODIUM 20 MG/G
40 SOLUTION TOPICAL 2 TIMES DAILY
Qty: 112 BOTTLE | Refills: 3 | Status: SHIPPED | OUTPATIENT
Start: 2021-08-26 | End: 2024-03-04 | Stop reason: SDUPTHER

## 2021-08-26 RX ORDER — PREGABALIN 50 MG/1
50 CAPSULE ORAL 3 TIMES DAILY
Qty: 90 CAPSULE | Refills: 6 | Status: SHIPPED | OUTPATIENT
Start: 2021-08-26 | End: 2022-03-02

## 2021-08-26 RX ORDER — BETAMETHASONE SODIUM PHOSPHATE AND BETAMETHASONE ACETATE 3; 3 MG/ML; MG/ML
6 INJECTION, SUSPENSION INTRA-ARTICULAR; INTRALESIONAL; INTRAMUSCULAR; SOFT TISSUE
Status: COMPLETED | OUTPATIENT
Start: 2021-08-26 | End: 2021-08-26

## 2021-08-26 RX ORDER — KETOROLAC TROMETHAMINE 30 MG/ML
60 INJECTION, SOLUTION INTRAMUSCULAR; INTRAVENOUS
Status: COMPLETED | OUTPATIENT
Start: 2021-08-26 | End: 2021-08-26

## 2021-08-26 RX ORDER — PAROXETINE HYDROCHLORIDE 40 MG/1
40 TABLET, FILM COATED ORAL
COMMUNITY
Start: 2021-05-27 | End: 2021-10-14 | Stop reason: SDUPTHER

## 2021-08-26 RX ORDER — DEXAMETHASONE SODIUM PHOSPHATE 4 MG/ML
4 INJECTION, SOLUTION INTRA-ARTICULAR; INTRALESIONAL; INTRAMUSCULAR; INTRAVENOUS; SOFT TISSUE
Status: COMPLETED | OUTPATIENT
Start: 2021-08-26 | End: 2021-08-26

## 2021-08-26 RX ORDER — ERGOCALCIFEROL 1.25 MG/1
50000 CAPSULE ORAL
Qty: 12 CAPSULE | Refills: 3 | Status: SHIPPED | OUTPATIENT
Start: 2021-08-26 | End: 2022-08-26

## 2021-08-26 RX ORDER — PROPRANOLOL HYDROCHLORIDE 20 MG/1
20 TABLET ORAL 2 TIMES DAILY PRN
COMMUNITY
Start: 2021-07-22 | End: 2022-04-26

## 2021-08-26 RX ORDER — AMLODIPINE BESYLATE 5 MG/1
5 TABLET ORAL DAILY
Qty: 30 TABLET | Refills: 11 | Status: SHIPPED | OUTPATIENT
Start: 2021-08-26 | End: 2022-04-26

## 2021-08-26 RX ORDER — HYDROCODONE BITARTRATE AND ACETAMINOPHEN 10; 325 MG/1; MG/1
1 TABLET ORAL EVERY 8 HOURS PRN
Qty: 90 TABLET | Refills: 0 | Status: SHIPPED | OUTPATIENT
Start: 2021-11-20 | End: 2021-12-19

## 2021-08-26 RX ORDER — HYDROCODONE BITARTRATE AND ACETAMINOPHEN 10; 325 MG/1; MG/1
1 TABLET ORAL EVERY 8 HOURS PRN
Qty: 90 TABLET | Refills: 0 | Status: SHIPPED | OUTPATIENT
Start: 2021-10-20 | End: 2021-08-26 | Stop reason: SDUPTHER

## 2021-08-26 RX ADMIN — BETAMETHASONE SODIUM PHOSPHATE AND BETAMETHASONE ACETATE 6 MG: 3; 3 INJECTION, SUSPENSION INTRA-ARTICULAR; INTRALESIONAL; INTRAMUSCULAR at 06:08

## 2021-08-26 RX ADMIN — KETOROLAC TROMETHAMINE 60 MG: 60 INJECTION, SOLUTION INTRAMUSCULAR at 06:08

## 2021-08-26 RX ADMIN — CYANOCOBALAMIN 1000 MCG: 1000 INJECTION, SOLUTION INTRAMUSCULAR at 06:08

## 2021-08-26 RX ADMIN — DEXAMETHASONE SODIUM PHOSPHATE 4 MG: 4 INJECTION, SOLUTION INTRA-ARTICULAR; INTRALESIONAL; INTRAMUSCULAR; INTRAVENOUS; SOFT TISSUE at 06:08

## 2021-08-26 ASSESSMENT — ROUTINE ASSESSMENT OF PATIENT INDEX DATA (RAPID3)
PAIN SCORE: 8.5
MDHAQ FUNCTION SCORE: 1
TOTAL RAPID3 SCORE: 6.61
PATIENT GLOBAL ASSESSMENT SCORE: 8
FATIGUE SCORE: 1.1
PSYCHOLOGICAL DISTRESS SCORE: 2.2

## 2021-08-27 ENCOUNTER — TELEPHONE (OUTPATIENT)
Dept: RHEUMATOLOGY | Facility: CLINIC | Age: 61
End: 2021-08-27

## 2021-08-27 ENCOUNTER — DOCUMENTATION ONLY (OUTPATIENT)
Dept: RHEUMATOLOGY | Facility: CLINIC | Age: 61
End: 2021-08-27

## 2021-09-18 RX ORDER — PROMETHAZINE HYDROCHLORIDE AND DEXTROMETHORPHAN HYDROBROMIDE 6.25; 15 MG/5ML; MG/5ML
5 SYRUP ORAL EVERY 4 HOURS PRN
Qty: 240 ML | Refills: 0 | Status: SHIPPED | OUTPATIENT
Start: 2021-09-18 | End: 2021-10-27

## 2021-10-14 RX ORDER — PAROXETINE HYDROCHLORIDE 40 MG/1
40 TABLET, FILM COATED ORAL DAILY
Qty: 30 TABLET | Refills: 3 | Status: SHIPPED | OUTPATIENT
Start: 2021-10-14 | End: 2022-01-04 | Stop reason: SDUPTHER

## 2021-10-14 RX ORDER — PAROXETINE HYDROCHLORIDE 20 MG/1
TABLET, FILM COATED ORAL
Qty: 30 TABLET | Refills: 3 | Status: CANCELLED | OUTPATIENT
Start: 2021-10-14

## 2021-11-10 DIAGNOSIS — K21.9 GASTROESOPHAGEAL REFLUX DISEASE WITHOUT ESOPHAGITIS: ICD-10-CM

## 2021-11-12 RX ORDER — OMEPRAZOLE 40 MG/1
40 CAPSULE, DELAYED RELEASE ORAL DAILY
Qty: 90 CAPSULE | Refills: 1 | Status: SHIPPED | OUTPATIENT
Start: 2021-11-12 | End: 2022-05-17 | Stop reason: SDUPTHER

## 2021-12-08 DIAGNOSIS — M06.00 SERONEGATIVE RHEUMATOID ARTHRITIS: ICD-10-CM

## 2021-12-08 DIAGNOSIS — L40.50 PSORIATIC ARTHRITIS: ICD-10-CM

## 2021-12-09 RX ORDER — CYCLOBENZAPRINE HCL 10 MG
10 TABLET ORAL 3 TIMES DAILY PRN
Qty: 90 TABLET | Refills: 3 | Status: SHIPPED | OUTPATIENT
Start: 2021-12-09 | End: 2022-04-19 | Stop reason: SDUPTHER

## 2021-12-20 ENCOUNTER — HOSPITAL ENCOUNTER (OUTPATIENT)
Dept: RADIOLOGY | Facility: HOSPITAL | Age: 61
Discharge: HOME | End: 2021-12-20
Attending: NURSE PRACTITIONER
Payer: MEDICARE

## 2021-12-20 DIAGNOSIS — R06.00 DYSPNEA: ICD-10-CM

## 2021-12-20 PROCEDURE — 71046 X-RAY EXAM CHEST 2 VIEWS: CPT | Mod: TC,FY,PO

## 2021-12-20 PROCEDURE — 71046 X-RAY EXAM CHEST 2 VIEWS: CPT | Mod: 26,,, | Performed by: RADIOLOGY

## 2021-12-20 PROCEDURE — 71046 XR CHEST PA AND LATERAL: ICD-10-PCS | Mod: 26,,, | Performed by: RADIOLOGY

## 2021-12-23 DIAGNOSIS — E03.9 HYPOTHYROIDISM, UNSPECIFIED TYPE: ICD-10-CM

## 2021-12-23 RX ORDER — LEVOTHYROXINE SODIUM 50 UG/1
50 TABLET ORAL
Qty: 90 TABLET | Refills: 1 | Status: SHIPPED | OUTPATIENT
Start: 2021-12-23 | End: 2022-04-28 | Stop reason: SDUPTHER

## 2022-01-04 ENCOUNTER — OFFICE VISIT (OUTPATIENT)
Dept: RHEUMATOLOGY | Facility: CLINIC | Age: 62
End: 2022-01-04
Payer: MEDICARE

## 2022-01-04 VITALS
BODY MASS INDEX: 22.38 KG/M2 | HEIGHT: 60 IN | DIASTOLIC BLOOD PRESSURE: 97 MMHG | WEIGHT: 114 LBS | HEART RATE: 94 BPM | SYSTOLIC BLOOD PRESSURE: 152 MMHG

## 2022-01-04 DIAGNOSIS — F32.A ANXIETY AND DEPRESSION: ICD-10-CM

## 2022-01-04 DIAGNOSIS — G89.4 CHRONIC PAIN SYNDROME: ICD-10-CM

## 2022-01-04 DIAGNOSIS — L40.50 PSORIATIC ARTHRITIS: ICD-10-CM

## 2022-01-04 DIAGNOSIS — R07.81 RIB PAIN ON LEFT SIDE: ICD-10-CM

## 2022-01-04 DIAGNOSIS — R51.9 NONINTRACTABLE HEADACHE, UNSPECIFIED CHRONICITY PATTERN, UNSPECIFIED HEADACHE TYPE: ICD-10-CM

## 2022-01-04 DIAGNOSIS — R05.9 COUGH: ICD-10-CM

## 2022-01-04 DIAGNOSIS — M06.00 SERONEGATIVE RHEUMATOID ARTHRITIS: Primary | ICD-10-CM

## 2022-01-04 DIAGNOSIS — D84.9 IMMUNOCOMPROMISED: ICD-10-CM

## 2022-01-04 DIAGNOSIS — F41.9 ANXIETY AND DEPRESSION: ICD-10-CM

## 2022-01-04 DIAGNOSIS — E55.9 VITAMIN D DEFICIENCY: ICD-10-CM

## 2022-01-04 PROCEDURE — 99214 PR OFFICE/OUTPT VISIT, EST, LEVL IV, 30-39 MIN: ICD-10-PCS | Mod: 25,S$PBB,, | Performed by: PHYSICIAN ASSISTANT

## 2022-01-04 PROCEDURE — 99214 OFFICE O/P EST MOD 30 MIN: CPT | Mod: 25,S$PBB,, | Performed by: PHYSICIAN ASSISTANT

## 2022-01-04 PROCEDURE — 99999 PR PBB SHADOW E&M-EST. PATIENT-LVL V: CPT | Mod: PBBFAC,,, | Performed by: PHYSICIAN ASSISTANT

## 2022-01-04 PROCEDURE — 99215 OFFICE O/P EST HI 40 MIN: CPT | Mod: PBBFAC,PN | Performed by: PHYSICIAN ASSISTANT

## 2022-01-04 PROCEDURE — 99999 PR PBB SHADOW E&M-EST. PATIENT-LVL V: ICD-10-PCS | Mod: PBBFAC,,, | Performed by: PHYSICIAN ASSISTANT

## 2022-01-04 PROCEDURE — 96372 THER/PROPH/DIAG INJ SC/IM: CPT | Mod: PBBFAC,PN

## 2022-01-04 RX ORDER — PAROXETINE HYDROCHLORIDE 40 MG/1
40 TABLET, FILM COATED ORAL DAILY
Qty: 30 TABLET | Refills: 3 | Status: SHIPPED | OUTPATIENT
Start: 2022-01-04 | End: 2022-04-26 | Stop reason: SDUPTHER

## 2022-01-04 RX ORDER — KETOROLAC TROMETHAMINE 30 MG/ML
60 INJECTION, SOLUTION INTRAMUSCULAR; INTRAVENOUS
Status: COMPLETED | OUTPATIENT
Start: 2022-01-04 | End: 2022-01-04

## 2022-01-04 RX ORDER — AZITHROMYCIN 250 MG/1
TABLET, FILM COATED ORAL
Qty: 6 TABLET | Refills: 0 | Status: SHIPPED | OUTPATIENT
Start: 2022-01-04 | End: 2022-04-26

## 2022-01-04 RX ORDER — CYANOCOBALAMIN 1000 UG/ML
1000 INJECTION, SOLUTION INTRAMUSCULAR; SUBCUTANEOUS
Status: COMPLETED | OUTPATIENT
Start: 2022-01-04 | End: 2022-01-04

## 2022-01-04 RX ADMIN — CYANOCOBALAMIN 1000 MCG: 1000 INJECTION INTRAMUSCULAR; SUBCUTANEOUS at 02:01

## 2022-01-04 RX ADMIN — KETOROLAC TROMETHAMINE 60 MG: 60 INJECTION, SOLUTION INTRAMUSCULAR at 02:01

## 2022-01-04 ASSESSMENT — ROUTINE ASSESSMENT OF PATIENT INDEX DATA (RAPID3)
PAIN SCORE: 7.5
PATIENT GLOBAL ASSESSMENT SCORE: 7.5
FATIGUE SCORE: 0
PSYCHOLOGICAL DISTRESS SCORE: 3.3
TOTAL RAPID3 SCORE: 6.22
MDHAQ FUNCTION SCORE: 1.1

## 2022-01-04 NOTE — TELEPHONE ENCOUNTER
Not due until 1/20 so I have post dated 3 [place]   PT was in ER are previously to place back his dislocated shoulder. PT advised 
that his shoulder popped out of place and now very painful at the joint 9/10. 
PT is not presenting any signs of acute distress.

## 2022-01-04 NOTE — PROGRESS NOTES
Subjective:       Patient ID: Barbara Mims is a 61 y.o. female.    Chief Complaint: Disease Management    Mrs. Mims is a 61 year old female who presents to clinic for follow up on psoriatic arthritis and pain management. She is doing fair overall. She continues to have chronic cough, but no significant shortness of breath. Her pulmonologist retired and she has not yet est w/new provider. She did not feel doxycycline helped her sx in the past. She continues to have productive sputum and is coughing through the night. She has new pain on the L posterior ribs. No injury. Not flank pain. CXR recently was negative for pneumonia. She has fatigue and low energy. PCP recently increased BP medication and BP remains high today.     She is currently on prednisone 10 mg qAM for arthritis. She has not started Rinvoq given recurrent lung infection. She has burning pain in her feet and leg cramps. Never started immunosuppressive medication due to chronic lung infections.  She has psoriatic nail changes, but no skin rash. She has chronic neck pain s/p cervical surgery and is no longer followed by pain management after multiple unsuccessful interventional treatments. She is on norco PRN for severe pain.    Current treatment:  1. Norco 10/325 TID PRN for pain  2. OTC ibuprofen 400 mg daily  3. Prednisone 10 mg daily  4. Gabapentin     Review of Systems   Constitutional: Positive for activity change. Negative for appetite change, chills, fatigue and fever.   Eyes: Negative for visual disturbance.   Respiratory:Positive for cough, sob, wheezing.  Cardiovascular: Negative for chest pain, palpitations and leg swelling.   Gastrointestinal: Negative for abdominal pain.   Musculoskeletal: Positive for arthralgias, joint swelling, neck pain and neck stiffness.   Neurological: Negative for dizziness, weakness, light-headedness and headaches.   Psychiatric/Behavioral: Negative for dysphoric mood. The patient is not nervous/anxious.           Objective:     Vitals:    01/04/22 1315   BP: (!) 152/97   Pulse: 94       Past Medical History:   Diagnosis Date    Alopecia     Anemia     Anxiety     Arthritis     Cardiac angina syndrome     Chronic kidney disease     kidney stones    Degenerative disc disease     Depression     Dry eyes     Dry mouth     Hyperlipidemia     Hypertension     Kidney stones     Mitral valve prolapse     Thyroid disease      Past Surgical History:   Procedure Laterality Date    CARDIAC CATHETERIZATION      CERVICAL DISC SURGERY      HYSTERECTOMY      LITHOTRIPSY            Physical Exam   Constitutional: She is well-developed, well-nourished, and in no distress.   Eyes: Right conjunctiva is not injected. Left conjunctiva is not injected. Right eye exhibits normal extraocular motion. Left eye exhibits normal extraocular motion.   Neck: No JVD present. Muscular tenderness present. No spinous process tenderness present. Decreased range of motion present. No thyromegaly present.   Cardiovascular: Normal rate and regular rhythm.  Exam reveals no decreased pulses.    Pulmonary/Chest: She has no wheezes. She has rhonchi. She has no rales.       Right Side Rheumatological Exam     Examination finds the shoulder, elbow, wrist, knee, 4th MCP and 5th MCP normal.    The patient is tender to palpation of the 1st PIP, 1st MCP, 2nd PIP, 2nd MCP, 3rd PIP, 3rd MCP, 4th PIP and 5th PIP    The patient has an enlarged 1st PIP    Left Side Rheumatological Exam     Examination finds the shoulder, elbow, wrist, knee, 1st MCP, 2nd MCP, 3rd MCP, 4th MCP and 5th MCP normal.    The patient is tender to palpation of the 1st PIP, 2nd PIP, 3rd PIP, 4th PIP and 5th PIP.    The patient has an enlarged 1st PIP.      Lymphadenopathy:     She has no cervical adenopathy.   Neurological: Gait normal.   Skin: No rash noted.     +psoriatic nail changes   Psychiatric: Mood and affect normal.       Labs reviewed:  Ext labs wbc wnl, platelet count  480, TSH wnl, CMP normal, HgbA1c 5.8%, Vit D 16  Assessment:       1. Seronegative rheumatoid arthritis    2. Psoriatic arthritis    3. Vitamin D deficiency    4. Immunocompromised    5. Anxiety and depression    6. Rib pain on left side    7. Cough    8. Chronic pain syndrome            Plan:       Seronegative rheumatoid arthritis  -     ketorolac injection 60 mg  -     cyanocobalamin injection 1,000 mcg  -     CBC Auto Differential; Future; Expected date: 01/04/2022  -     Comprehensive Metabolic Panel; Future; Expected date: 01/04/2022  -     C-Reactive Protein; Future; Expected date: 01/04/2022  -     Sedimentation rate; Future; Expected date: 01/04/2022    Psoriatic arthritis  -     ketorolac injection 60 mg  -     cyanocobalamin injection 1,000 mcg  -     CBC Auto Differential; Future; Expected date: 01/04/2022  -     Comprehensive Metabolic Panel; Future; Expected date: 01/04/2022  -     C-Reactive Protein; Future; Expected date: 01/04/2022  -     Sedimentation rate; Future; Expected date: 01/04/2022    Vitamin D deficiency    Immunocompromised    Anxiety and depression  -     paroxetine (PAXIL) 40 MG tablet; Take 1 tablet (40 mg total) by mouth once daily.  Dispense: 30 tablet; Refill: 3    Rib pain on left side  -     X-Ray Ribs 2 View Left; Future; Expected date: 01/04/2022    Cough  -     azithromycin (Z-MEHNAZ) 250 MG tablet; Take 2 tablets by mouth on day 1; Take 1 tablet by mouth on days 2-5  Dispense: 6 tablet; Refill: 0    Chronic pain syndrome        Assessment:  61 year old female with   Psoriatic arthritis, psoriasis, seronegative RA, fibromyalgia   --chronic neck pain s/p cervical surgery, failed pain management interventions  --chronic pain syndrome on norco  --chronic insomnia on seroquel  --fibromyalgia  --vitamin D deficiency  --macrocytosis     Plan:  1. Toradol 60 mg, b12  2. Azithromycin sent. Est w/pulmonary  3. X-ray ribs ordered  4. Cont. paxil daily, Cont. seroquel nightly  5. Restart  vit D 50,000 once weekly  6. Cont. norco prn per Dr. Rodriguez.  I have checked [place] prescription monitoring program site and no unusual or abnormal behavior has occurred pt understand the risk and benefits of taking opioid medications and has decided to continue the medication.      Follow up:   3-4 mo Dr. Rodriguez w/labs prior

## 2022-01-04 NOTE — PROGRESS NOTES
Administered B 12 ,1cc in Right Upper Gluteal    Administered Ketorolac 60 mg, cc's Left Upper Gluteal

## 2022-01-05 RX ORDER — HYDROCODONE BITARTRATE AND ACETAMINOPHEN 10; 325 MG/1; MG/1
1 TABLET ORAL EVERY 8 HOURS PRN
Qty: 90 TABLET | Refills: 0 | Status: SHIPPED | OUTPATIENT
Start: 2022-02-19 | End: 2022-04-26

## 2022-01-05 RX ORDER — HYDROCODONE BITARTRATE AND ACETAMINOPHEN 10; 325 MG/1; MG/1
1 TABLET ORAL EVERY 8 HOURS PRN
Qty: 90 TABLET | Refills: 0 | Status: SHIPPED | OUTPATIENT
Start: 2022-03-21 | End: 2022-04-24

## 2022-01-05 RX ORDER — HYDROCODONE BITARTRATE AND ACETAMINOPHEN 10; 325 MG/1; MG/1
1 TABLET ORAL EVERY 8 HOURS PRN
Qty: 90 TABLET | Refills: 0 | Status: SHIPPED | OUTPATIENT
Start: 2022-01-20 | End: 2022-04-26

## 2022-03-10 ENCOUNTER — PATIENT MESSAGE (OUTPATIENT)
Dept: RHEUMATOLOGY | Facility: CLINIC | Age: 62
End: 2022-03-10
Payer: MEDICARE

## 2022-04-11 DIAGNOSIS — M06.00 SERONEGATIVE RHEUMATOID ARTHRITIS: ICD-10-CM

## 2022-04-11 DIAGNOSIS — L40.50 PSORIATIC ARTHRITIS: ICD-10-CM

## 2022-04-11 RX ORDER — GABAPENTIN 300 MG/1
600 CAPSULE ORAL 2 TIMES DAILY
Qty: 360 CAPSULE | Refills: 3 | Status: SHIPPED | OUTPATIENT
Start: 2022-04-11 | End: 2023-04-06 | Stop reason: SDUPTHER

## 2022-04-11 NOTE — TELEPHONE ENCOUNTER
Pharmacy requesting refill on Gabapentin 300mg  Pt's LOV 01/04/2022  Pt's NOV 04/26/2022  Medication pending

## 2022-04-12 ENCOUNTER — TELEPHONE (OUTPATIENT)
Dept: RHEUMATOLOGY | Facility: CLINIC | Age: 62
End: 2022-04-12
Payer: MEDICARE

## 2022-04-12 NOTE — TELEPHONE ENCOUNTER
----- Message from Jia Salinas sent at 4/12/2022 12:07 PM CDT -----  Contact: Patient  Type:  Needs Medical Advice    Who Called:  Patient      Would the patient rather a call back or a response via [facility]?  Call    Best Call Back Number:  [phone] (home)     Additional Information:  patient thinks she missed a call from Dr Rodriguez office     If anyone called her please call back

## 2022-04-21 ENCOUNTER — TELEPHONE (OUTPATIENT)
Dept: RHEUMATOLOGY | Facility: CLINIC | Age: 62
End: 2022-04-21

## 2022-04-21 DIAGNOSIS — R51.9 NONINTRACTABLE HEADACHE, UNSPECIFIED CHRONICITY PATTERN, UNSPECIFIED HEADACHE TYPE: ICD-10-CM

## 2022-04-21 DIAGNOSIS — L40.50 PSORIATIC ARTHRITIS: ICD-10-CM

## 2022-04-21 DIAGNOSIS — G89.4 CHRONIC PAIN SYNDROME: ICD-10-CM

## 2022-04-21 RX ORDER — HYDROCODONE BITARTRATE AND ACETAMINOPHEN 10; 325 MG/1; MG/1
1 TABLET ORAL EVERY 8 HOURS PRN
Qty: 90 TABLET | Refills: 0 | Status: CANCELLED | OUTPATIENT
Start: 2022-04-21

## 2022-04-21 NOTE — TELEPHONE ENCOUNTER
----- Message from Marilyn Gaffney sent at 4/21/2022  1:42 PM CDT -----  Regarding: refill  Type:  RX Refill Request    Who Called: [facility], pharmacy  Refill or New Rx:refill  RX Name and Strength:HYDROcodone-acetaminophen (NORCO)  mg per tablet  How is the patient currently taking it? (ex. 1XDay):  Is this a 30 day or 90 day RX:30  Preferred Pharmacy with phone number:[facility] [address] [place] [address] [address], [address]  Local or Mail Order:local  Ordering Provider:  Would the patient rather a call back or a response via [facility]? call  Best Call Back Number:[phone]  Additional Information:

## 2022-04-21 NOTE — TELEPHONE ENCOUNTER
----- Message from Delmi Salcedo sent at 4/21/2022  9:33 AM CDT -----  Who Called:        Refill or New Rx: refill         RX Name and Strength:   HYDROcodone-acetaminophen (NORCO)  mg per tablet        How is the patient currently taking it? (ex. 1XDay)        Is this a 30 day or 90 day RX        Preferred Pharmacy with phone number:  [facility] [id number] [place] [address] [address], [address]        Local or Mail Order:    local         Ordering Provider:        Would the patient rather a call back or a response via [facility]? Call back         Best Call Back Number:   [phone]        Additional Information:

## 2022-04-26 ENCOUNTER — OFFICE VISIT (OUTPATIENT)
Dept: RHEUMATOLOGY | Facility: CLINIC | Age: 62
End: 2022-04-26
Payer: MEDICARE

## 2022-04-26 VITALS
SYSTOLIC BLOOD PRESSURE: 98 MMHG | HEART RATE: 108 BPM | DIASTOLIC BLOOD PRESSURE: 65 MMHG | HEIGHT: 60 IN | BODY MASS INDEX: 22.42 KG/M2 | WEIGHT: 114.19 LBS

## 2022-04-26 DIAGNOSIS — L40.50 PSORIATIC ARTHRITIS: ICD-10-CM

## 2022-04-26 DIAGNOSIS — E55.9 VITAMIN D DEFICIENCY: ICD-10-CM

## 2022-04-26 DIAGNOSIS — R51.9 NONINTRACTABLE HEADACHE, UNSPECIFIED CHRONICITY PATTERN, UNSPECIFIED HEADACHE TYPE: ICD-10-CM

## 2022-04-26 DIAGNOSIS — F51.01 PRIMARY INSOMNIA: Primary | ICD-10-CM

## 2022-04-26 DIAGNOSIS — E06.9 THYROIDITIS: ICD-10-CM

## 2022-04-26 DIAGNOSIS — F41.9 ANXIETY AND DEPRESSION: ICD-10-CM

## 2022-04-26 DIAGNOSIS — F32.A ANXIETY AND DEPRESSION: ICD-10-CM

## 2022-04-26 DIAGNOSIS — M06.00 SERONEGATIVE RHEUMATOID ARTHRITIS: ICD-10-CM

## 2022-04-26 DIAGNOSIS — M79.7 FIBROMYALGIA: ICD-10-CM

## 2022-04-26 DIAGNOSIS — R05.9 COUGH: ICD-10-CM

## 2022-04-26 DIAGNOSIS — F11.90 CHRONIC, CONTINUOUS USE OF OPIOIDS: ICD-10-CM

## 2022-04-26 DIAGNOSIS — I73.00 RAYNAUD'S DISEASE WITHOUT GANGRENE: ICD-10-CM

## 2022-04-26 DIAGNOSIS — L40.8 PSORIASIS WITH PUSTULES: ICD-10-CM

## 2022-04-26 DIAGNOSIS — G89.4 CHRONIC PAIN SYNDROME: ICD-10-CM

## 2022-04-26 PROCEDURE — 99999 PR PBB SHADOW E&M-EST. PATIENT-LVL IV: ICD-10-PCS | Mod: PBBFAC,,, | Performed by: INTERNAL MEDICINE

## 2022-04-26 PROCEDURE — 99215 OFFICE O/P EST HI 40 MIN: CPT | Mod: 25,S$PBB,, | Performed by: INTERNAL MEDICINE

## 2022-04-26 PROCEDURE — 99999 PR PBB SHADOW E&M-EST. PATIENT-LVL IV: CPT | Mod: PBBFAC,,, | Performed by: INTERNAL MEDICINE

## 2022-04-26 PROCEDURE — 96372 THER/PROPH/DIAG INJ SC/IM: CPT | Mod: PBBFAC,PN

## 2022-04-26 PROCEDURE — 99215 PR OFFICE/OUTPT VISIT, EST, LEVL V, 40-54 MIN: ICD-10-PCS | Mod: 25,S$PBB,, | Performed by: INTERNAL MEDICINE

## 2022-04-26 PROCEDURE — 99214 OFFICE O/P EST MOD 30 MIN: CPT | Mod: PBBFAC,PN | Performed by: INTERNAL MEDICINE

## 2022-04-26 RX ORDER — LISINOPRIL 10 MG/1
10 TABLET ORAL 2 TIMES DAILY
Qty: 180 TABLET | Refills: 3 | Status: SHIPPED | OUTPATIENT
Start: 2022-04-26 | End: 2022-09-21

## 2022-04-26 RX ORDER — ZALEPLON 10 MG/1
10 CAPSULE ORAL NIGHTLY
Qty: 30 CAPSULE | Refills: 3 | Status: SHIPPED | OUTPATIENT
Start: 2022-04-26 | End: 2022-08-24

## 2022-04-26 RX ORDER — DEXAMETHASONE SODIUM PHOSPHATE 4 MG/ML
8 INJECTION, SOLUTION INTRA-ARTICULAR; INTRALESIONAL; INTRAMUSCULAR; INTRAVENOUS; SOFT TISSUE
Status: COMPLETED | OUTPATIENT
Start: 2022-04-26 | End: 2022-04-26

## 2022-04-26 RX ORDER — HYDROCODONE BITARTRATE AND ACETAMINOPHEN 10; 325 MG/1; MG/1
1 TABLET ORAL EVERY 8 HOURS PRN
Qty: 90 TABLET | Refills: 0 | Status: SHIPPED | OUTPATIENT
Start: 2022-05-21 | End: 2022-04-26 | Stop reason: SDUPTHER

## 2022-04-26 RX ORDER — PAROXETINE HYDROCHLORIDE 40 MG/1
40 TABLET, FILM COATED ORAL DAILY
Qty: 30 TABLET | Refills: 6 | Status: SHIPPED | OUTPATIENT
Start: 2022-04-26 | End: 2022-07-23

## 2022-04-26 RX ORDER — HYDROCODONE BITARTRATE AND ACETAMINOPHEN 10; 325 MG/1; MG/1
1 TABLET ORAL EVERY 8 HOURS PRN
Qty: 90 TABLET | Refills: 0 | Status: SHIPPED | OUTPATIENT
Start: 2022-07-20 | End: 2022-08-19

## 2022-04-26 RX ORDER — BUTALBITAL, ACETAMINOPHEN AND CAFFEINE 50; 325; 40 MG/1; MG/1; MG/1
1 TABLET ORAL EVERY 6 HOURS PRN
Qty: 120 TABLET | Refills: 3 | Status: SHIPPED | OUTPATIENT
Start: 2022-04-26 | End: 2022-08-30

## 2022-04-26 RX ORDER — NALOXONE HYDROCHLORIDE 4 MG/.1ML
1 SPRAY NASAL ONCE
Qty: 1 EACH | Refills: 0 | Status: SHIPPED | OUTPATIENT
Start: 2022-04-26 | End: 2022-04-26

## 2022-04-26 RX ORDER — KETOROLAC TROMETHAMINE 30 MG/ML
60 INJECTION, SOLUTION INTRAMUSCULAR; INTRAVENOUS
Status: COMPLETED | OUTPATIENT
Start: 2022-04-26 | End: 2022-04-26

## 2022-04-26 RX ORDER — PROMETHAZINE HYDROCHLORIDE AND DEXTROMETHORPHAN HYDROBROMIDE 6.25; 15 MG/5ML; MG/5ML
5 SYRUP ORAL EVERY 4 HOURS PRN
Qty: 240 ML | Refills: 3 | Status: SHIPPED | OUTPATIENT
Start: 2022-04-26 | End: 2022-05-11

## 2022-04-26 RX ORDER — DOXYCYCLINE HYCLATE 100 MG
100 TABLET ORAL 2 TIMES DAILY
Qty: 30 TABLET | Refills: 0 | Status: SHIPPED | OUTPATIENT
Start: 2022-04-26 | End: 2022-05-11

## 2022-04-26 RX ORDER — HYDROCODONE BITARTRATE AND ACETAMINOPHEN 10; 325 MG/1; MG/1
1 TABLET ORAL EVERY 8 HOURS PRN
Qty: 90 TABLET | Refills: 0 | Status: SHIPPED | OUTPATIENT
Start: 2022-06-20 | End: 2022-04-26 | Stop reason: SDUPTHER

## 2022-04-26 RX ADMIN — KETOROLAC TROMETHAMINE 60 MG: 60 INJECTION, SOLUTION INTRAMUSCULAR at 05:04

## 2022-04-26 RX ADMIN — DEXAMETHASONE SODIUM PHOSPHATE 8 MG: 4 INJECTION INTRA-ARTICULAR; INTRALESIONAL; INTRAMUSCULAR; INTRAVENOUS; SOFT TISSUE at 05:04

## 2022-04-26 NOTE — PROGRESS NOTES
Subjective:       Patient ID: Barbara Mims is a 62 y.o. female.    Chief Complaint: Disease Management    Follow up: 62 year old female who presents to clinic for follow up on psoriatic arthritis and pain management with a worsening cough She is doing fair overall. She continues to have chronic cough, but no significant shortness of breath. She is on breo and albuterol nebulizer and cough meds, still productive cough with sputum that is brown.   She continues to have productive sputum and is coughing through the night. She has new pain on the L posterior ribs. No injury. Not flank pain. CXR recently was negative for pneumonia.  She has not started Rinvoq given recurrent lung infection. She has burning pain in her feet and leg cramps. Never started immunosuppressive medication due to chronic lung infections.  She has psoriatic nail changes, but no skin rash. She has chronic neck pain s/p cervical surgery and is no longer followed by pain management after multiple unsuccessful interventional treatments. She is on norco PRN for severe pain.    Current treatment:  1. Norco 10/325 TID PRN for pain  2. OTC ibuprofen 400 mg daily  3. Prednisone 10 mg daily  4. Gabapentin       Review of Systems   Constitutional: Positive for chills and fatigue. Negative for activity change, appetite change, diaphoresis, fever and unexpected weight change.   HENT: Negative for congestion, dental problem, ear discharge, ear pain, facial swelling, mouth sores, nosebleeds, postnasal drip, rhinorrhea, sinus pressure, sneezing, sore throat, tinnitus, trouble swallowing and voice change.    Eyes: Negative for photophobia, pain, discharge, redness and itching.   Respiratory: Positive for cough and shortness of breath. Negative for apnea, chest tightness and wheezing.    Cardiovascular: Positive for leg swelling. Negative for chest pain and palpitations.   Gastrointestinal: Positive for abdominal pain. Negative for abdominal distention,  constipation, diarrhea, nausea and vomiting.   Endocrine: Negative for cold intolerance, heat intolerance, polydipsia and polyuria.   Genitourinary: Negative for decreased urine volume, difficulty urinating, dysuria, flank pain, frequency, hematuria and urgency.   Musculoskeletal: Positive for arthralgias, back pain, gait problem, joint swelling, myalgias, neck pain and neck stiffness.   Skin: Negative for pallor, rash and wound.   Allergic/Immunologic: Negative for immunocompromised state.   Neurological: Positive for weakness. Negative for dizziness, tremors, numbness and headaches.   Hematological: Negative for adenopathy. Does not bruise/bleed easily.   Psychiatric/Behavioral: Negative for sleep disturbance. The patient is not nervous/anxious.          Objective:   BP 98/65   Pulse 108   Ht 5' (1.524 m)   Wt 51.8 kg (114 lb 3.2 oz)   BMI 22.30 kg/m²      Physical Exam   Constitutional: She is oriented to person, place, and time.   HENT:   Head: Normocephalic and atraumatic.   Mouth/Throat: Oropharynx is clear and moist.   Eyes: Pupils are equal, round, and reactive to light.   Neck: No thyromegaly present.   Cardiovascular: Normal rate, regular rhythm and normal heart sounds. Exam reveals no gallop and no friction rub.   No murmur heard.  Pulmonary/Chest: Breath sounds normal. She has no wheezes. She has no rales. She exhibits no tenderness.   Abdominal: There is no abdominal tenderness. There is no rebound and no guarding.   Musculoskeletal:         General: Tenderness and deformity present.      Right shoulder: Tenderness present.      Left shoulder: Tenderness present.      Right elbow: Normal.      Left elbow: Normal.      Right wrist: Tenderness present.      Left wrist: Tenderness present.      Cervical back: Neck supple.      Right knee: No effusion. Tenderness present.      Left knee: No effusion. Tenderness present.   Lymphadenopathy:     She has no cervical adenopathy.   Neurological: She is alert  and oriented to person, place, and time. Gait normal.   Skin: No rash noted. No erythema. No pallor.   Psychiatric: Mood and affect normal.   Nursing note and vitals reviewed.      Right Side Rheumatological Exam     Examination finds the elbow normal.    The patient is tender to palpation of the shoulder, wrist, knee, 1st PIP, 1st MCP, 2nd PIP, 2nd MCP, 3rd PIP, 3rd MCP, 4th PIP, 4th MCP, 5th PIP and 5th MCP    She has swelling of the 1st PIP, 1st MCP, 2nd PIP, 2nd MCP, 3rd PIP, 3rd MCP, 4th PIP, 4th MCP, 5th PIP and 5th MCP    Shoulder Exam   Tenderness Location: no tenderness    Range of Motion   Active abduction: abnormal   Adduction: abnormal  Sensation: normal    Knee Exam   Patellofemoral Crepitus: positive  Effusion: negative  Sensation: normal    Hip Exam   Tenderness Location: posterior  Sensation: normal    Elbow/Wrist Exam   Tenderness Location: no tenderness  Sensation: normal    Left Side Rheumatological Exam     Examination finds the elbow normal.    The patient is tender to palpation of the shoulder, wrist, knee, 1st PIP, 1st MCP, 2nd PIP, 2nd MCP, 3rd PIP, 3rd MCP, 4th PIP, 4th MCP, 5th PIP and 5th MCP.    She has swelling of the 1st PIP, 1st MCP, 2nd PIP, 2nd MCP, 3rd PIP, 3rd MCP, 4th PIP, 4th MCP, 5th PIP and 5th MCP    Shoulder Exam   Tenderness Location: no tenderness    Range of Motion   Active abduction: abnormal   Sensation: normal    Knee Exam     Patellofemoral Crepitus: positive  Effusion: negative  Sensation: normal    Hip Exam   Tenderness Location: posterior  Sensation: normal    Elbow/Wrist Exam   Sensation: normal      Back/Neck Exam   General Inspection   Gait: normal              Results for orders placed or performed in visit on 12/14/20   Quantiferon Gold TB   Result Value Ref Range    NIL 0.020 See text IU/mL    TB1 - Nil 0.000 See text IU/mL    TB2 - Nil 0.000 See text IU/mL    Mitogen - Nil 8.030 See text IU/mL    TB Gold Plus Negative         Assessment:       1. Primary  insomnia    2. Cough    3. Nonintractable headache, unspecified chronicity pattern, unspecified headache type    4. Psoriatic arthritis    5. Chronic pain syndrome    6. Anxiety and depression    7. Vitamin D deficiency    8. Chronic, continuous use of opioids    9. Seronegative rheumatoid arthritis    10. Raynaud's disease without gangrene    11. Thyroiditis    12. Psoriasis with pustules    13. Fibromyalgia            Plan:       Barbara was seen today for disease management.    Diagnoses and all orders for this visit:    Primary insomnia  -     zaleplon (SONATA) 10 MG capsule; Take 1 capsule (10 mg total) by mouth every evening.    Cough  -     CT Chest Without Contrast; Future  -     Ambulatory referral/consult to Pulmonology; Future  -     promethazine-dextromethorphan (PROMETHAZINE-DM) 6.25-15 mg/5 mL Syrp; Take 5 mLs by mouth every 4 (four) hours as needed (cough).  -     COVID-19 (SARS CoV-2) IgG Antibody Quant; Future    Nonintractable headache, unspecified chronicity pattern, unspecified headache type  -     butalbital-acetaminophen-caffeine -40 mg (FIORICET, ESGIC) -40 mg per tablet; Take 1 tablet by mouth every 6 (six) hours as needed for Pain.  -     Discontinue: HYDROcodone-acetaminophen (NORCO)  mg per tablet; Take 1 tablet by mouth every 8 (eight) hours as needed for Pain.  -     Discontinue: HYDROcodone-acetaminophen (NORCO)  mg per tablet; Take 1 tablet by mouth every 8 (eight) hours as needed for Pain.  -     HYDROcodone-acetaminophen (NORCO)  mg per tablet; Take 1 tablet by mouth every 8 (eight) hours as needed for Pain.  -     COVID-19 (SARS CoV-2) IgG Antibody Quant; Future    Psoriatic arthritis  -     Discontinue: HYDROcodone-acetaminophen (NORCO)  mg per tablet; Take 1 tablet by mouth every 8 (eight) hours as needed for Pain.  -     Discontinue: HYDROcodone-acetaminophen (NORCO)  mg per tablet; Take 1 tablet by mouth every 8 (eight) hours as needed  for Pain.  -     HYDROcodone-acetaminophen (NORCO)  mg per tablet; Take 1 tablet by mouth every 8 (eight) hours as needed for Pain.  -     lisinopriL 10 MG tablet; Take 1 tablet (10 mg total) by mouth 2 (two) times daily.  -     ketorolac injection 60 mg  -     dexamethasone injection 8 mg  -     doxycycline (VIBRA-TABS) 100 MG tablet; Take 1 tablet (100 mg total) by mouth 2 (two) times daily. for 15 days  -     COVID-19 (SARS CoV-2) IgG Antibody Quant; Future  -     Comprehensive Metabolic Panel; Future  -     CBC Auto Differential; Future  -     C-Reactive Protein; Future  -     Sedimentation rate; Future    Chronic pain syndrome  -     Discontinue: HYDROcodone-acetaminophen (NORCO)  mg per tablet; Take 1 tablet by mouth every 8 (eight) hours as needed for Pain.  -     Discontinue: HYDROcodone-acetaminophen (NORCO)  mg per tablet; Take 1 tablet by mouth every 8 (eight) hours as needed for Pain.  -     HYDROcodone-acetaminophen (NORCO)  mg per tablet; Take 1 tablet by mouth every 8 (eight) hours as needed for Pain.  -     lisinopriL 10 MG tablet; Take 1 tablet (10 mg total) by mouth 2 (two) times daily.  -     naloxone (NARCAN) 4 mg/actuation Spry; 1 spray (4 mg total) by Nasal route once. for 1 dose  -     ketorolac injection 60 mg  -     dexamethasone injection 8 mg  -     doxycycline (VIBRA-TABS) 100 MG tablet; Take 1 tablet (100 mg total) by mouth 2 (two) times daily. for 15 days  -     COVID-19 (SARS CoV-2) IgG Antibody Quant; Future  -     Comprehensive Metabolic Panel; Future  -     CBC Auto Differential; Future  -     C-Reactive Protein; Future  -     Sedimentation rate; Future    Anxiety and depression  -     paroxetine (PAXIL) 40 MG tablet; Take 1 tablet (40 mg total) by mouth once daily.  -     ketorolac injection 60 mg  -     dexamethasone injection 8 mg  -     doxycycline (VIBRA-TABS) 100 MG tablet; Take 1 tablet (100 mg total) by mouth 2 (two) times daily. for 15 days  -      COVID-19 (SARS CoV-2) IgG Antibody Quant; Future    Vitamin D deficiency  -     lisinopriL 10 MG tablet; Take 1 tablet (10 mg total) by mouth 2 (two) times daily.  -     ketorolac injection 60 mg  -     dexamethasone injection 8 mg  -     doxycycline (VIBRA-TABS) 100 MG tablet; Take 1 tablet (100 mg total) by mouth 2 (two) times daily. for 15 days  -     Comprehensive Metabolic Panel; Future  -     CBC Auto Differential; Future  -     C-Reactive Protein; Future  -     Sedimentation rate; Future    Chronic, continuous use of opioids  -     ketorolac injection 60 mg  -     dexamethasone injection 8 mg  -     doxycycline (VIBRA-TABS) 100 MG tablet; Take 1 tablet (100 mg total) by mouth 2 (two) times daily. for 15 days  -     COVID-19 (SARS CoV-2) IgG Antibody Quant; Future  -     Comprehensive Metabolic Panel; Future  -     CBC Auto Differential; Future  -     C-Reactive Protein; Future  -     Sedimentation rate; Future    Seronegative rheumatoid arthritis  -     lisinopriL 10 MG tablet; Take 1 tablet (10 mg total) by mouth 2 (two) times daily.  -     Comprehensive Metabolic Panel; Future  -     CBC Auto Differential; Future  -     C-Reactive Protein; Future  -     Sedimentation rate; Future    Raynaud's disease without gangrene  -     lisinopriL 10 MG tablet; Take 1 tablet (10 mg total) by mouth 2 (two) times daily.  -     Comprehensive Metabolic Panel; Future  -     CBC Auto Differential; Future  -     C-Reactive Protein; Future  -     Sedimentation rate; Future    Thyroiditis    Psoriasis with pustules    Fibromyalgia

## 2022-04-26 NOTE — PROGRESS NOTES
Patient was administered 2cc (8mg) Dexamethasone IM into Right Upper Outer Quad Gluteus. Patient was administered 2cc (60mg) Ketorolac IM into Right Upper Outer Quad Gluteus. Patient tolerated injections well without adverse reactions noted. No complaints voiced at this time.

## 2022-04-28 DIAGNOSIS — E03.9 HYPOTHYROIDISM, UNSPECIFIED TYPE: ICD-10-CM

## 2022-04-28 RX ORDER — LEVOTHYROXINE SODIUM 50 UG/1
50 TABLET ORAL
Qty: 90 TABLET | Refills: 1 | Status: SHIPPED | OUTPATIENT
Start: 2022-04-28 | End: 2023-04-06 | Stop reason: SDUPTHER

## 2022-05-17 DIAGNOSIS — K21.9 GASTROESOPHAGEAL REFLUX DISEASE WITHOUT ESOPHAGITIS: ICD-10-CM

## 2022-05-17 RX ORDER — OMEPRAZOLE 40 MG/1
40 CAPSULE, DELAYED RELEASE ORAL DAILY
Qty: 90 CAPSULE | Refills: 1 | Status: SHIPPED | OUTPATIENT
Start: 2022-05-17 | End: 2022-11-17 | Stop reason: SDUPTHER

## 2022-05-17 NOTE — TELEPHONE ENCOUNTER
Pharmacy requesting refill on Omeprazole 40mg  Pt's LOV 04/26/2022  Pt's NOV 09/06/2022  Medication pending

## 2022-06-09 DIAGNOSIS — R05.9 COUGH, UNSPECIFIED: ICD-10-CM

## 2022-06-13 RX ORDER — PROMETHAZINE HYDROCHLORIDE AND DEXTROMETHORPHAN HYDROBROMIDE 6.25; 15 MG/5ML; MG/5ML
SYRUP ORAL
Qty: 240 ML | Refills: 1 | Status: SHIPPED | OUTPATIENT
Start: 2022-06-13 | End: 2022-06-15

## 2022-08-18 DIAGNOSIS — R51.9 NONINTRACTABLE HEADACHE, UNSPECIFIED CHRONICITY PATTERN, UNSPECIFIED HEADACHE TYPE: ICD-10-CM

## 2022-08-18 DIAGNOSIS — L40.50 PSORIATIC ARTHRITIS: ICD-10-CM

## 2022-08-18 DIAGNOSIS — G89.4 CHRONIC PAIN SYNDROME: ICD-10-CM

## 2022-08-19 RX ORDER — HYDROCODONE BITARTRATE AND ACETAMINOPHEN 10; 325 MG/1; MG/1
TABLET ORAL
Qty: 90 TABLET | Refills: 0 | Status: SHIPPED | OUTPATIENT
Start: 2022-08-19 | End: 2022-09-06 | Stop reason: SDUPTHER

## 2022-08-23 ENCOUNTER — TELEPHONE (OUTPATIENT)
Dept: RHEUMATOLOGY | Facility: CLINIC | Age: 62
End: 2022-08-23
Payer: MEDICARE

## 2022-08-23 NOTE — TELEPHONE ENCOUNTER
Called patient to get more information about labs looked in patient chart and she has labs and they are scheduled one week before her apt patient did not answer ----- Message from Tee Moy sent at 8/23/2022 12:19 PM CDT -----  Type: Needs Medical Advice  Who Called:  patient  Best Call Back Number: [phone]   Additional Information: Patient stated she needed an order for blood work.

## 2022-09-06 ENCOUNTER — OFFICE VISIT (OUTPATIENT)
Dept: RHEUMATOLOGY | Facility: CLINIC | Age: 62
End: 2022-09-06
Payer: MEDICARE

## 2022-09-06 VITALS
DIASTOLIC BLOOD PRESSURE: 78 MMHG | WEIGHT: 112 LBS | SYSTOLIC BLOOD PRESSURE: 124 MMHG | BODY MASS INDEX: 21.99 KG/M2 | HEART RATE: 98 BPM | HEIGHT: 60 IN

## 2022-09-06 DIAGNOSIS — E55.9 VITAMIN D DEFICIENCY: ICD-10-CM

## 2022-09-06 DIAGNOSIS — E03.9 HYPOTHYROIDISM, UNSPECIFIED TYPE: ICD-10-CM

## 2022-09-06 DIAGNOSIS — L40.50 PSORIATIC ARTHRITIS: Primary | ICD-10-CM

## 2022-09-06 DIAGNOSIS — R05.9 COUGH: ICD-10-CM

## 2022-09-06 DIAGNOSIS — G89.4 CHRONIC PAIN SYNDROME: ICD-10-CM

## 2022-09-06 DIAGNOSIS — R73.9 HYPERGLYCEMIA: ICD-10-CM

## 2022-09-06 DIAGNOSIS — R51.9 NONINTRACTABLE HEADACHE, UNSPECIFIED CHRONICITY PATTERN, UNSPECIFIED HEADACHE TYPE: ICD-10-CM

## 2022-09-06 DIAGNOSIS — L40.50 PSORIATIC ARTHRITIS: ICD-10-CM

## 2022-09-06 PROCEDURE — 99999 PR PBB SHADOW E&M-EST. PATIENT-LVL V: ICD-10-PCS | Mod: PBBFAC,,, | Performed by: PHYSICIAN ASSISTANT

## 2022-09-06 PROCEDURE — 96372 THER/PROPH/DIAG INJ SC/IM: CPT | Mod: PBBFAC,PN

## 2022-09-06 PROCEDURE — 99215 PR OFFICE/OUTPT VISIT, EST, LEVL V, 40-54 MIN: ICD-10-PCS | Mod: 25,S$PBB,, | Performed by: PHYSICIAN ASSISTANT

## 2022-09-06 PROCEDURE — 99215 OFFICE O/P EST HI 40 MIN: CPT | Mod: 25,PBBFAC,PN | Performed by: PHYSICIAN ASSISTANT

## 2022-09-06 PROCEDURE — 99215 OFFICE O/P EST HI 40 MIN: CPT | Mod: 25,S$PBB,, | Performed by: PHYSICIAN ASSISTANT

## 2022-09-06 PROCEDURE — 99999 PR PBB SHADOW E&M-EST. PATIENT-LVL V: CPT | Mod: PBBFAC,,, | Performed by: PHYSICIAN ASSISTANT

## 2022-09-06 RX ORDER — ERGOCALCIFEROL 1.25 MG/1
50000 CAPSULE ORAL
Qty: 12 CAPSULE | Refills: 1 | Status: SHIPPED | OUTPATIENT
Start: 2022-09-06 | End: 2022-12-09 | Stop reason: SDUPTHER

## 2022-09-06 RX ORDER — CYANOCOBALAMIN 1000 UG/ML
1000 INJECTION, SOLUTION INTRAMUSCULAR; SUBCUTANEOUS
Status: COMPLETED | OUTPATIENT
Start: 2022-09-06 | End: 2022-09-06

## 2022-09-06 RX ORDER — ALBUTEROL SULFATE 90 UG/1
2 AEROSOL, METERED RESPIRATORY (INHALATION) EVERY 6 HOURS PRN
Qty: 18 G | Refills: 3 | Status: SHIPPED | OUTPATIENT
Start: 2022-09-06 | End: 2023-01-13 | Stop reason: SDUPTHER

## 2022-09-06 RX ORDER — SULFASALAZINE 500 MG/1
1000 TABLET, DELAYED RELEASE ORAL 2 TIMES DAILY
Qty: 120 TABLET | Refills: 3 | Status: SHIPPED | OUTPATIENT
Start: 2022-09-06 | End: 2023-04-06

## 2022-09-06 RX ORDER — KETOROLAC TROMETHAMINE 30 MG/ML
60 INJECTION, SOLUTION INTRAMUSCULAR; INTRAVENOUS
Status: COMPLETED | OUTPATIENT
Start: 2022-09-06 | End: 2022-09-06

## 2022-09-06 RX ORDER — DOXYCYCLINE 100 MG/1
100 CAPSULE ORAL EVERY 12 HOURS
Qty: 20 CAPSULE | Refills: 3 | Status: SHIPPED | OUTPATIENT
Start: 2022-09-06 | End: 2022-09-16

## 2022-09-06 RX ADMIN — CYANOCOBALAMIN 1000 MCG: 1000 INJECTION INTRAMUSCULAR; SUBCUTANEOUS at 01:09

## 2022-09-06 RX ADMIN — KETOROLAC TROMETHAMINE 60 MG: 60 INJECTION, SOLUTION INTRAMUSCULAR at 01:09

## 2022-09-06 NOTE — PROGRESS NOTES
2 Patient ID's verified and allergies reviewed     Administered Ketorolac 60mtg/mL, 2cc's in Left Upper Gluteal    Administered b12, 05165 mcg, 1cc in Right Upper Gluteal    Patient tolerated injections well and left the facility in stable condition

## 2022-09-06 NOTE — PATIENT INSTRUCTIONS
Start sulfasalazine 1000 mg twice daily for arthritis    Decrease prednisone 10 mg daily if possible    Taper off lyrica by decreasing dose 50 mg every 2-3 weeks    Schedule CT chest and call Pulmonary to schedule a follow up visit

## 2022-09-06 NOTE — PROGRESS NOTES
Subjective:       Patient ID: Barbara Mims is a 62 y.o. female.    Chief Complaint: Disease Management    Mrs. Mims is a 62 year old female who presents to clinic for follow up on psoriatic arthritis and pain management. She continues to have chronic cough, but no significant shortness of breath. Her pulmonologist retired and she has not yet est w/new provider. She continues to have productive sputum and is coughing through the night and she feels she is never clear of infection. CT chest ordered, but not yet scheduled.    She complains of worsening fatigue. No issues with sleep taking seroquel and sonata. Labs are pending.    She is currently on prednisone 15 mg qAM for arthritis. She has not started Rinvoq given recurrent lung infection. Never started immunosuppressive medication due to chronic lung infections.  She has psoriatic nail changes, but no skin rash.     She has chronic neck pain s/p cervical surgery and is no longer followed by pain management after multiple unsuccessful interventional treatments. She is on norco and fioricet PRN for severe pain. She is taking lyrica and gabapentin and it is not clear why both.    Current treatment:  1. Norco 10/325 TID PRN for pain  2. OTC ibuprofen 400 mg daily  3. Prednisone 10 mg daily  4. Gabapentin     Review of Systems   Constitutional: Positive for activity change. Negative for appetite change, chills, fatigue and fever.   Eyes: Negative for visual disturbance.   Respiratory:Positive for cough, sob, wheezing.  Cardiovascular: Negative for chest pain, palpitations and leg swelling.   Gastrointestinal: Negative for abdominal pain.   Musculoskeletal: Positive for arthralgias, joint swelling, neck pain and neck stiffness.   Neurological: Negative for dizziness, weakness, light-headedness and headaches.   Psychiatric/Behavioral: Negative for dysphoric mood. The patient is not nervous/anxious.          Objective:     Vitals:    09/06/22 1212   BP: 124/78    Pulse: 98       Past Medical History:   Diagnosis Date    Alopecia     Anemia     Anxiety     Arthritis     Cardiac angina syndrome     Chronic kidney disease     kidney stones    Degenerative disc disease     Depression     Dry eyes     Dry mouth     Hyperlipidemia     Hypertension     Kidney stones     Mitral valve prolapse     Thyroid disease      Past Surgical History:   Procedure Laterality Date    CARDIAC CATHETERIZATION      CERVICAL DISC SURGERY      HYSTERECTOMY      LITHOTRIPSY            Physical Exam   Constitutional: She is well-developed, well-nourished, and in no distress.   Eyes: Right conjunctiva is not injected. Left conjunctiva is not injected. Right eye exhibits normal extraocular motion. Left eye exhibits normal extraocular motion.   Neck: No JVD present. Muscular tenderness present. No spinous process tenderness present. Decreased range of motion present. No thyromegaly present.   Cardiovascular: Normal rate and regular rhythm.  Exam reveals no decreased pulses.    Pulmonary/Chest: She has no wheezes. She has rhonchi. She has no rales.       Right Side Rheumatological Exam     Examination finds the shoulder, elbow, wrist, knee, 4th MCP and 5th MCP normal.    The patient is tender to palpation of the 1st PIP, 1st MCP, 2nd PIP, 2nd MCP, 3rd PIP, 3rd MCP, 4th PIP and 5th PIP    The patient has an enlarged 1st PIP    Left Side Rheumatological Exam     Examination finds the shoulder, elbow, wrist, knee, 1st MCP, 2nd MCP, 3rd MCP, 4th MCP and 5th MCP normal.    The patient is tender to palpation of the 1st PIP, 2nd PIP, 3rd PIP, 4th PIP and 5th PIP.    The patient has an enlarged 1st PIP.      Lymphadenopathy:     She has no cervical adenopathy.   Neurological: Gait normal.   Skin: No rash noted.     +psoriatic nail changes   Psychiatric: Mood and affect normal.       Labs pending  Assessment:       1. Psoriatic arthritis    2. Hypothyroidism, unspecified type    3. Hyperglycemia    4. Vitamin D  deficiency    5. Cough            Plan:       Psoriatic arthritis  -     sulfaSALAzine (AZULFIDINE) 500 MG EC tablet; Take 2 tablets (1,000 mg total) by mouth 2 (two) times daily.  Dispense: 120 tablet; Refill: 3  -     ketorolac injection 60 mg  -     cyanocobalamin injection 1,000 mcg  -     albuterol (VENTOLIN HFA) 90 mcg/actuation inhaler; Inhale 2 puffs into the lungs every 6 (six) hours as needed for Wheezing.  Dispense: 18 g; Refill: 3    Hypothyroidism, unspecified type  -     TSH; Future; Expected date: 09/06/2022  -     T4, Free; Future; Expected date: 09/06/2022  -     TSH; Future; Expected date: 09/06/2022  -     T4, Free; Future; Expected date: 09/06/2022    Hyperglycemia  -     Hemoglobin A1C; Future; Expected date: 09/06/2022  -     Hemoglobin A1C; Future; Expected date: 09/06/2022    Vitamin D deficiency  -     Vitamin D; Future; Expected date: 09/06/2022  -     ergocalciferol (ERGOCALCIFEROL) 50,000 unit Cap; Take 1 capsule (50,000 Units total) by mouth every 7 days.  Dispense: 12 capsule; Refill: 1  -     Vitamin D; Future; Expected date: 09/06/2022    Cough  -     doxycycline (MONODOX) 100 MG capsule; Take 1 capsule (100 mg total) by mouth every 12 (twelve) hours. for 10 days  Dispense: 20 capsule; Refill: 3      Assessment:  62 year old female with   Psoriatic arthritis, psoriasis, seronegative RA, fibromyalgia   --chronic neck pain s/p cervical surgery, failed pain management interventions  --chronic pain syndrome on norco  --chronic insomnia on seroquel  --fibromyalgia  --vitamin D deficiency  --macrocytosis     Plan:  1. Toradol 60 mg, b12  2. Doxycycline sent. Est with Pulmonary. CT chest. If no infection then plan to move forward with Rinvoq (she has needle phobia)  3. Restart vitamin D 50,000 once weekly  4. Cont. paxil daily, Cont. seroquel nightly, sonata nightly  5. Restart vit D 50,000 once weekly  6. Cont. norco prn per Dr. Rodriguez.  I have checked [place] prescription monitoring  program site and no unusual or abnormal behavior has occurred pt understand the risk and benefits of taking opioid medications and has decided to continue the medication. Pended x 3  7. Start SSZ 1000 mg twice daily for arthritis. Taper prednisone 10 mg in 1-2 months if possible  8. Taper off lyrica 50 mg every 2-3 weeks until off and continue gabapentin only  9. Check additional labs now and f/u pending lab results    Follow up:   3-4 mo Dr. Rodriguez w/labs prior

## 2022-09-08 RX ORDER — HYDROCODONE BITARTRATE AND ACETAMINOPHEN 10; 325 MG/1; MG/1
1 TABLET ORAL EVERY 8 HOURS PRN
Qty: 90 TABLET | Refills: 0 | Status: SHIPPED | OUTPATIENT
Start: 2022-09-19 | End: 2022-12-09

## 2022-09-08 RX ORDER — HYDROCODONE BITARTRATE AND ACETAMINOPHEN 10; 325 MG/1; MG/1
1 TABLET ORAL EVERY 8 HOURS PRN
Qty: 90 TABLET | Refills: 0 | Status: SHIPPED | OUTPATIENT
Start: 2022-11-18 | End: 2022-12-09

## 2022-09-08 RX ORDER — HYDROCODONE BITARTRATE AND ACETAMINOPHEN 10; 325 MG/1; MG/1
1 TABLET ORAL EVERY 8 HOURS PRN
Qty: 90 TABLET | Refills: 0 | Status: SHIPPED | OUTPATIENT
Start: 2022-10-19 | End: 2022-12-09

## 2022-09-09 ENCOUNTER — TELEPHONE (OUTPATIENT)
Dept: RHEUMATOLOGY | Facility: CLINIC | Age: 62
End: 2022-09-09
Payer: MEDICARE

## 2022-09-09 NOTE — TELEPHONE ENCOUNTER
Message sent to provider to review labs  ----- Message from Mary Redding sent at 9/9/2022  3:06 PM CDT -----  Contact: Pt  Type:  Test Results    Who Called:  Pt  Name of Test (Lab/Mammo/Etc):  Lab  Date of Test:  9/6  Ordering Provider:  Jennifer Bang Call Back Number:  [phone]  Additional Information:  Please call back. Thanks.

## 2022-09-16 NOTE — TELEPHONE ENCOUNTER
Inflammation markers are slightly elevated, but otherwise labs are stable. Please add sulfasalazine 1000 mg twice daily as we discussed in the office.

## 2022-10-10 ENCOUNTER — TELEPHONE (OUTPATIENT)
Dept: RHEUMATOLOGY | Facility: CLINIC | Age: 62
End: 2022-10-10
Payer: MEDICARE

## 2022-10-10 NOTE — TELEPHONE ENCOUNTER
----- Message from Helga Vazquez sent at 10/10/2022 10:46 AM CDT -----  Who Called: Patient    What is the reqeust in detail: Requesting call back for an update on her earlier inquiry about her lab results (completed on 09/06/22 and 09/22/22 at [facility]) and her complet CT (on 09/22/22 at [facility]). Please advise.      Can the clinic reply by [facility]? No    Best Call Back Number: [phone]    Additional Information:

## 2022-10-12 ENCOUNTER — TELEPHONE (OUTPATIENT)
Dept: RHEUMATOLOGY | Facility: CLINIC | Age: 62
End: 2022-10-12
Payer: MEDICARE

## 2022-10-12 NOTE — TELEPHONE ENCOUNTER
Called patient back in reply to her message that she didn't get a call back about her lab results. Left a message on answering machine. Here are the results per Patrizia Gay Pa-C:    HgbA1c is consistent with pre-diabetes. Vitamin D is improving. Thyroid is normal.

## 2022-10-21 DIAGNOSIS — R10.9 FLANK PAIN: Primary | ICD-10-CM

## 2022-10-21 DIAGNOSIS — R93.89 ABNORMAL CT OF THE CHEST: ICD-10-CM

## 2022-10-21 DIAGNOSIS — R05.3 CHRONIC COUGH: ICD-10-CM

## 2022-10-23 ENCOUNTER — TELEPHONE (OUTPATIENT)
Dept: RHEUMATOLOGY | Facility: CLINIC | Age: 62
End: 2022-10-23
Payer: MEDICARE

## 2022-10-24 NOTE — TELEPHONE ENCOUNTER
Pt came into clinic for results review on 10/21/22. CT chest reviewed. She continues to have R sided rib pain. X-ray ribs ordered. Pulmonary referral placed. Please fax referral and schedule xray. Advised pt to go to ER if pain worsens.

## 2022-10-24 NOTE — TELEPHONE ENCOUNTER
Called patient and left message to schedule x ray appt and faxed [facility] Fisher

## 2022-11-01 DIAGNOSIS — R51.9 NONINTRACTABLE HEADACHE, UNSPECIFIED CHRONICITY PATTERN, UNSPECIFIED HEADACHE TYPE: ICD-10-CM

## 2022-11-10 RX ORDER — BUTALBITAL, ACETAMINOPHEN AND CAFFEINE 50; 325; 40 MG/1; MG/1; MG/1
TABLET ORAL
Qty: 120 TABLET | Refills: 3 | Status: SHIPPED | OUTPATIENT
Start: 2022-11-10 | End: 2023-04-07

## 2022-11-17 DIAGNOSIS — M06.00 SERONEGATIVE RHEUMATOID ARTHRITIS: ICD-10-CM

## 2022-11-17 DIAGNOSIS — K21.9 GASTROESOPHAGEAL REFLUX DISEASE WITHOUT ESOPHAGITIS: ICD-10-CM

## 2022-11-17 DIAGNOSIS — L40.50 PSORIATIC ARTHRITIS: ICD-10-CM

## 2022-11-17 RX ORDER — OMEPRAZOLE 40 MG/1
40 CAPSULE, DELAYED RELEASE ORAL DAILY
Qty: 90 CAPSULE | Refills: 1 | Status: SHIPPED | OUTPATIENT
Start: 2022-11-17 | End: 2023-04-06 | Stop reason: SDUPTHER

## 2022-11-17 RX ORDER — HYDROXYCHLOROQUINE SULFATE 200 MG/1
TABLET, FILM COATED ORAL
Qty: 30 TABLET | Refills: 6 | Status: SHIPPED | OUTPATIENT
Start: 2022-11-17 | End: 2023-04-06 | Stop reason: SDUPTHER

## 2022-11-17 NOTE — TELEPHONE ENCOUNTER
Pharmacy requesting refill on Omeprazole 40mg  Pt's LOV 09/06/2022  Pt's NOV 12/09/2022  Medication pending

## 2022-11-19 DIAGNOSIS — L40.50 PSORIATIC ARTHRITIS: ICD-10-CM

## 2022-11-19 DIAGNOSIS — M06.00 SERONEGATIVE RHEUMATOID ARTHRITIS: ICD-10-CM

## 2022-11-21 RX ORDER — HYDROXYCHLOROQUINE SULFATE 200 MG/1
TABLET, FILM COATED ORAL
Qty: 30 TABLET | Refills: 3 | OUTPATIENT
Start: 2022-11-21

## 2022-11-30 DIAGNOSIS — G47.00 INSOMNIA, UNSPECIFIED TYPE: ICD-10-CM

## 2022-12-03 RX ORDER — QUETIAPINE FUMARATE 50 MG/1
TABLET, FILM COATED ORAL
Qty: 90 TABLET | Refills: 5 | Status: SHIPPED | OUTPATIENT
Start: 2022-12-03 | End: 2023-04-06 | Stop reason: SDUPTHER

## 2022-12-09 ENCOUNTER — OFFICE VISIT (OUTPATIENT)
Dept: RHEUMATOLOGY | Facility: CLINIC | Age: 62
End: 2022-12-09
Payer: MEDICARE

## 2022-12-09 VITALS
HEART RATE: 93 BPM | WEIGHT: 109.56 LBS | DIASTOLIC BLOOD PRESSURE: 87 MMHG | HEIGHT: 60 IN | SYSTOLIC BLOOD PRESSURE: 121 MMHG | BODY MASS INDEX: 21.51 KG/M2

## 2022-12-09 DIAGNOSIS — E78.5 HYPERLIPIDEMIA, UNSPECIFIED HYPERLIPIDEMIA TYPE: ICD-10-CM

## 2022-12-09 DIAGNOSIS — R51.9 NONINTRACTABLE HEADACHE, UNSPECIFIED CHRONICITY PATTERN, UNSPECIFIED HEADACHE TYPE: ICD-10-CM

## 2022-12-09 DIAGNOSIS — R93.89 ABNORMAL CT OF THE CHEST: ICD-10-CM

## 2022-12-09 DIAGNOSIS — L40.50 PSORIATIC ARTHRITIS: Primary | ICD-10-CM

## 2022-12-09 DIAGNOSIS — M79.7 FIBROMYALGIA: ICD-10-CM

## 2022-12-09 DIAGNOSIS — D84.9 IMMUNOCOMPROMISED: ICD-10-CM

## 2022-12-09 DIAGNOSIS — E55.9 VITAMIN D DEFICIENCY: ICD-10-CM

## 2022-12-09 DIAGNOSIS — M06.00 SERONEGATIVE RHEUMATOID ARTHRITIS: ICD-10-CM

## 2022-12-09 DIAGNOSIS — R05.3 CHRONIC COUGH: ICD-10-CM

## 2022-12-09 DIAGNOSIS — G89.4 CHRONIC PAIN SYNDROME: ICD-10-CM

## 2022-12-09 DIAGNOSIS — I10 HYPERTENSION, UNSPECIFIED TYPE: ICD-10-CM

## 2022-12-09 DIAGNOSIS — F41.9 ANXIETY: ICD-10-CM

## 2022-12-09 DIAGNOSIS — R73.9 HYPERGLYCEMIA: ICD-10-CM

## 2022-12-09 DIAGNOSIS — Z79.52 CURRENT CHRONIC USE OF SYSTEMIC STEROIDS: ICD-10-CM

## 2022-12-09 PROCEDURE — 99215 OFFICE O/P EST HI 40 MIN: CPT | Mod: 25,S$PBB,, | Performed by: INTERNAL MEDICINE

## 2022-12-09 PROCEDURE — 96372 THER/PROPH/DIAG INJ SC/IM: CPT | Mod: PBBFAC,PN

## 2022-12-09 PROCEDURE — 99214 OFFICE O/P EST MOD 30 MIN: CPT | Mod: PBBFAC,25,PN | Performed by: INTERNAL MEDICINE

## 2022-12-09 PROCEDURE — 99999 PR PBB SHADOW E&M-EST. PATIENT-LVL IV: ICD-10-PCS | Mod: PBBFAC,,, | Performed by: INTERNAL MEDICINE

## 2022-12-09 PROCEDURE — 99215 PR OFFICE/OUTPT VISIT, EST, LEVL V, 40-54 MIN: ICD-10-PCS | Mod: 25,S$PBB,, | Performed by: INTERNAL MEDICINE

## 2022-12-09 PROCEDURE — 99999 PR PBB SHADOW E&M-EST. PATIENT-LVL IV: CPT | Mod: PBBFAC,,, | Performed by: INTERNAL MEDICINE

## 2022-12-09 RX ORDER — CYCLOBENZAPRINE HCL 10 MG
TABLET ORAL
Qty: 90 TABLET | Refills: 6 | Status: SHIPPED | OUTPATIENT
Start: 2022-12-09 | End: 2023-04-06 | Stop reason: SDUPTHER

## 2022-12-09 RX ORDER — ATORVASTATIN CALCIUM 20 MG/1
20 TABLET, FILM COATED ORAL DAILY
Qty: 90 TABLET | Refills: 3 | Status: SHIPPED | OUTPATIENT
Start: 2022-12-09 | End: 2023-04-06

## 2022-12-09 RX ORDER — ERGOCALCIFEROL 1.25 MG/1
50000 CAPSULE ORAL
Qty: 12 CAPSULE | Refills: 3 | Status: SHIPPED | OUTPATIENT
Start: 2022-12-09 | End: 2023-04-06 | Stop reason: SDUPTHER

## 2022-12-09 RX ORDER — KETOROLAC TROMETHAMINE 30 MG/ML
60 INJECTION, SOLUTION INTRAMUSCULAR; INTRAVENOUS
Status: COMPLETED | OUTPATIENT
Start: 2022-12-09 | End: 2022-12-09

## 2022-12-09 RX ORDER — OXYCODONE AND ACETAMINOPHEN 10; 325 MG/1; MG/1
1 TABLET ORAL EVERY 8 HOURS PRN
Qty: 90 TABLET | Refills: 0 | Status: SHIPPED | OUTPATIENT
Start: 2022-12-09 | End: 2023-01-08

## 2022-12-09 RX ORDER — BUSPIRONE HYDROCHLORIDE 10 MG/1
10 TABLET ORAL 3 TIMES DAILY
Qty: 90 TABLET | Refills: 11 | Status: SHIPPED | OUTPATIENT
Start: 2022-12-09 | End: 2023-11-24

## 2022-12-09 RX ORDER — PREDNISONE 2.5 MG/1
10 TABLET ORAL DAILY
Qty: 120 TABLET | Refills: 11 | Status: SHIPPED | OUTPATIENT
Start: 2022-12-09 | End: 2023-12-22

## 2022-12-09 RX ORDER — AMLODIPINE BESYLATE 5 MG/1
5 TABLET ORAL DAILY
Qty: 90 TABLET | Refills: 3 | Status: SHIPPED | OUTPATIENT
Start: 2022-12-09 | End: 2023-12-14 | Stop reason: SDUPTHER

## 2022-12-09 RX ORDER — OXYCODONE AND ACETAMINOPHEN 10; 325 MG/1; MG/1
1 TABLET ORAL EVERY 8 HOURS PRN
Qty: 90 TABLET | Refills: 0 | Status: SHIPPED | OUTPATIENT
Start: 2023-02-09 | End: 2023-03-11

## 2022-12-09 RX ORDER — CYANOCOBALAMIN 1000 UG/ML
1000 INJECTION, SOLUTION INTRAMUSCULAR; SUBCUTANEOUS
Status: COMPLETED | OUTPATIENT
Start: 2022-12-09 | End: 2022-12-09

## 2022-12-09 RX ORDER — OXYCODONE AND ACETAMINOPHEN 10; 325 MG/1; MG/1
1 TABLET ORAL EVERY 8 HOURS PRN
Qty: 90 TABLET | Refills: 0 | Status: SHIPPED | OUTPATIENT
Start: 2023-01-09 | End: 2023-02-08

## 2022-12-09 RX ADMIN — KETOROLAC TROMETHAMINE 60 MG: 60 INJECTION, SOLUTION INTRAMUSCULAR at 01:12

## 2022-12-09 RX ADMIN — CYANOCOBALAMIN 1000 MCG: 1000 INJECTION, SOLUTION INTRAMUSCULAR at 01:12

## 2022-12-09 NOTE — PROGRESS NOTES
Subjective:       Patient ID: Barbara Mims is a 62 y.o. female.    Chief Complaint: Disease Management    Follow up: 62 year old female who presents to clinic for follow up on psoriatic arthritis and pain management. She continues to have chronic cough, but no significant shortness of breath. On albuterol and breo and nebulizer her sputum production. Her pulmonologist retired and she has not yet est w/new provider. She continues to have productive sputum and is coughing through the night and she feels she is never clear of infection. CT chest ordered, she  has stress in her mother  and her daughter is not speaking to her.She complains of worsening fatigue. No issues with sleep taking seroquel and sonata. Labs are pending.    She is currently on prednisone 15 mg qAM for arthritis. She has not started Rinvoq given recurrent lung infection. Never started immunosuppressive medication due to chronic lung infections.  She has psoriatic nail changes, but no skin rash.     She has chronic neck pain s/p cervical surgery and is no longer followed by pain management after multiple unsuccessful interventional treatments. She is on norco and fioricet PRN for severe pain. She is taking lyrica and gabapentin and it is not clear why both.    Current treatment:  1. Norco 10/325 TID PRN for pain  2. OTC ibuprofen 400 mg daily  3. Prednisone 10 mg daily  4. Gabapentin       Review of Systems   Constitutional:  Positive for chills and fatigue. Negative for activity change, appetite change, diaphoresis, fever and unexpected weight change.   HENT:  Negative for congestion, dental problem, ear discharge, ear pain, facial swelling, mouth sores, nosebleeds, postnasal drip, rhinorrhea, sinus pressure, sneezing, sore throat, tinnitus, trouble swallowing and voice change.    Eyes:  Negative for photophobia, pain, discharge, redness and itching.   Respiratory:  Positive for cough. Negative for apnea, chest tightness, shortness of  breath and wheezing.    Cardiovascular:  Positive for leg swelling. Negative for chest pain and palpitations.   Gastrointestinal:  Positive for abdominal pain. Negative for abdominal distention, constipation, diarrhea, nausea and vomiting.   Endocrine: Negative for cold intolerance, heat intolerance, polydipsia and polyuria.   Genitourinary:  Negative for decreased urine volume, difficulty urinating, dysuria, flank pain, frequency, hematuria and urgency.   Musculoskeletal:  Positive for arthralgias, back pain, gait problem, joint swelling, myalgias, neck pain and neck stiffness.   Skin:  Negative for pallor, rash and wound.   Allergic/Immunologic: Negative for immunocompromised state.   Neurological:  Negative for dizziness, tremors, numbness and headaches.   Hematological:  Negative for adenopathy. Does not bruise/bleed easily.   Psychiatric/Behavioral:  Negative for sleep disturbance. The patient is not nervous/anxious.        Objective:   /87   Pulse 93   Ht 5' (1.524 m)   Wt 49.7 kg (109 lb 9.1 oz)   BMI 21.40 kg/m²      Physical Exam   Constitutional: She is oriented to person, place, and time. She appears distressed.   HENT:   Head: Normocephalic and atraumatic.   Eyes: Pupils are equal, round, and reactive to light. Right eye exhibits no discharge. Left eye exhibits no discharge.   Neck: No thyromegaly present.   Cardiovascular: Normal rate, regular rhythm and normal heart sounds. Exam reveals no gallop and no friction rub.   No murmur heard.  Pulmonary/Chest: Breath sounds normal. She has no wheezes. She has no rales. She exhibits no tenderness.   Abdominal: There is no abdominal tenderness. There is no rebound and no guarding.   Musculoskeletal:         General: Tenderness present.      Right shoulder: Tenderness present.      Left shoulder: Tenderness present.      Right elbow: Normal.      Left elbow: Tenderness present.      Right wrist: Tenderness present.      Left wrist: Tenderness present.       Cervical back: Neck supple.      Right knee: Effusion present. Tenderness present.      Left knee: Effusion present. Tenderness present.   Lymphadenopathy:     She has no cervical adenopathy.   Neurological: She is alert and oriented to person, place, and time. Gait normal.   Skin: Skin is dry. No rash noted. No erythema. There is pallor.   Psychiatric: Mood and affect normal.   Vitals reviewed.      Right Side Rheumatological Exam     Examination finds the elbow normal.    The patient is tender to palpation of the shoulder, wrist, knee, 1st PIP, 1st MCP, 2nd PIP, 2nd MCP, 3rd PIP, 3rd MCP, 4th PIP, 4th MCP, 5th PIP and 5th MCP    She has swelling of the 1st PIP, 1st MCP, 2nd PIP, 2nd MCP, 3rd PIP, 3rd MCP, 4th PIP, 4th MCP, 5th PIP and 5th MCP    Shoulder Exam   Tenderness Location: no tenderness    Range of Motion   Active abduction:  abnormal   Adduction: abnormal  Sensation: normal    Knee Exam   Patellofemoral Crepitus: positive  Effusion: positive  Sensation: normal    Hip Exam   Tenderness Location: posterior  Sensation: normal    Elbow/Wrist Exam   Tenderness Location: no tenderness  Sensation: normal    Left Side Rheumatological Exam     The patient is tender to palpation of the shoulder, elbow, wrist, knee, 1st PIP, 1st MCP, 2nd PIP, 2nd MCP, 3rd PIP, 3rd MCP, 4th PIP, 4th MCP, 5th PIP and 5th MCP.    She has swelling of the 1st PIP, 1st MCP, 2nd PIP, 2nd MCP, 3rd PIP, 3rd MCP, 4th PIP, 4th MCP, 5th PIP and 5th MCP    Shoulder Exam   Tenderness Location: no tenderness    Range of Motion   Active abduction:  abnormal   Sensation: normal    Knee Exam     Patellofemoral Crepitus: positive  Effusion: positive  Sensation: normal    Hip Exam   Tenderness Location: posterior  Sensation: normal    Elbow/Wrist Exam   Sensation: normal      Back/Neck Exam   General Inspection   Gait: normal          Results for orders placed or performed in visit on 09/22/22   TSH   Result Value Ref Range    TSH 0.860 0.400 -  4.000 uIU/mL   Hemoglobin A1c   Result Value Ref Range    Hemoglobin A1C 5.7 (H) 0.0 - 5.6 %    Estimated Avg Glucose 117 68 - 131 mg/dL   Vitamin D   Result Value Ref Range    Vit D, 25-Hydroxy 31 30 - 96 ng/mL   T4, free   Result Value Ref Range    Free T4 0.83 0.78 - 2.19 ng/dL     Collected Updated Procedure    09/22/2022 1122 09/22/2022 1635 T4, free [[id number]]   Blood    Component Value Units   Free T4 0.83  ng/dL          09/06/2022 1142 09/06/2022 1329 Sedimentation rate [[id number]]    (Abnormal)   Blood    Component Value Units   Sed Rate 35 High  mm/Hr          09/06/2022 1142 09/06/2022 1341 C-Reactive Protein [[id number]]    (Abnormal)   Blood    Component Value Units   CRP 2.70 High  mg/dL          09/06/2022 1142 09/06/2022 1307 CBC Auto Differential [[id number]]    (Abnormal)   Blood    Component Value Units   WBC 12.61 K/uL   RBC 3.57 Low  M/uL   Hemoglobin 12.0 g/dL   Hematocrit 37.3 %    High  fL   MCH 33.6 High  pg   MCHC 32.2 g/dL   RDW 13.2 %   Platelets 400 K/uL   MPV 9.3 fL   Immature Granulocytes 0.5 %   Gran # (ANC) 10.8 High  K/uL   Immature Grans (Abs) 0.06 High   K/uL   Lymph # 1.2 K/uL   Mono # 0.4 K/uL   Eos # 0.1 K/uL   Baso # 0.09 K/uL   nRBC 0 /100 WBC   Gran % 85.7 High  %   Lymph % 9.4 Low  %   Mono % 2.8 Low  %   Eosinophil % 0.9 %   Basophil % 0.7 %   Differential Method Automated           09/06/2022 1142 09/06/2022 1341 Comprehensive Metabolic Panel [[id number]]    (Abnormal)   Blood    Component Value Units   Sodium 140 mmol/L   Potassium 4.6 mmol/L   Chloride 104 mmol/L   CO2 26 mmol/L   Glucose 136 High   mg/dL   BUN 14 mg/dL   Creatinine 0.58 mg/dL   Calcium 9.0 mg/dL   Total Protein 7.1 g/dL   Albumin 4.4 g/dL   Total Bilirubin 0.3 mg/dL   Alkaline Phosphatase 103 U/L   AST 23 U/L   ALT 15 U/L   Anion Gap 10 mmol/L   eGFR >60 mL/min/1.73 m^2          09/06/2022 1142 09/06/2022 2004 COVID-19 (SARS CoV-2) IgG Antibody Quant [[id number]]    Blood    Component Value Units    COVID-19 (SARS CoV-2) IgG Antibody Quantitative 6029.9 AU/ml   COVID-19 (SARS CoV-2) IgG Antibody Interpretation Positive                 Assessment:       1. Psoriatic arthritis    2. Chronic cough    3. Abnormal CT of the chest    4. Hyperglycemia    5. Current chronic use of systemic steroids    6. Vitamin D deficiency    7. Seronegative rheumatoid arthritis    8. Immunocompromised    9. Chronic pain syndrome    10. Cough    11. Fibromyalgia    12. Hypertension, unspecified type    13. Hyperlipidemia, unspecified hyperlipidemia type    14. Nonintractable headache, unspecified chronicity pattern, unspecified headache type    15. Anxiety            Plan:       Barbara was seen today for disease management.    Diagnoses and all orders for this visit:    Psoriatic arthritis  -     predniSONE (DELTASONE) 2.5 MG tablet; Take 4 tablets (10 mg total) by mouth once daily.  -     DXA Bone Density Spine And Hip; Future  -     CBC Auto Differential; Future  -     Comprehensive Metabolic Panel; Future  -     Sedimentation rate; Future  -     C-Reactive Protein; Future  -     TSH; Future  -     T4, Free; Future  -     T3; Future  -     Hepatitis B Core Antibody, IgM; Future  -     Hepatitis B Surface Ab, Qualitative; Future  -     Hepatitis B Surface Antigen; Future  -     Hepatitis C Antibody; Future  -     Protein Electrophoresis, Serum; Future  -     amLODIPine (NORVASC) 5 MG tablet; Take 1 tablet (5 mg total) by mouth once daily.  -     cyclobenzaprine (FLEXERIL) 10 MG tablet; TAKE ONE TABLET BY MOUTH THREE TIMES DAILY AS NEEDED FOR MUSCLE SPASMS  -     oxyCODONE-acetaminophen (PERCOCET)  mg per tablet; Take 1 tablet by mouth every 8 (eight) hours as needed for Pain.  -     oxyCODONE-acetaminophen (PERCOCET)  mg per tablet; Take 1 tablet by mouth every 8 (eight) hours as needed for Pain.    Chronic cough  -     DXA Bone Density Spine And Hip; Future  -     CBC Auto Differential; Future  -     Comprehensive  Metabolic Panel; Future  -     Sedimentation rate; Future  -     C-Reactive Protein; Future  -     TSH; Future  -     T4, Free; Future  -     T3; Future  -     Hepatitis B Core Antibody, IgM; Future  -     Hepatitis B Surface Ab, Qualitative; Future  -     Hepatitis B Surface Antigen; Future  -     Hepatitis C Antibody; Future  -     Protein Electrophoresis, Serum; Future    Abnormal CT of the chest  -     DXA Bone Density Spine And Hip; Future  -     CBC Auto Differential; Future  -     Comprehensive Metabolic Panel; Future  -     Sedimentation rate; Future  -     C-Reactive Protein; Future  -     TSH; Future  -     T4, Free; Future  -     T3; Future  -     Hepatitis B Core Antibody, IgM; Future  -     Hepatitis B Surface Ab, Qualitative; Future  -     Hepatitis B Surface Antigen; Future  -     Hepatitis C Antibody; Future  -     Protein Electrophoresis, Serum; Future    Hyperglycemia  -     DXA Bone Density Spine And Hip; Future  -     CBC Auto Differential; Future  -     Comprehensive Metabolic Panel; Future  -     Sedimentation rate; Future  -     C-Reactive Protein; Future  -     TSH; Future  -     T4, Free; Future  -     T3; Future  -     Hepatitis B Core Antibody, IgM; Future  -     Hepatitis B Surface Ab, Qualitative; Future  -     Hepatitis B Surface Antigen; Future  -     Hepatitis C Antibody; Future  -     Protein Electrophoresis, Serum; Future    Current chronic use of systemic steroids  -     DXA Bone Density Spine And Hip; Future  -     CBC Auto Differential; Future  -     Comprehensive Metabolic Panel; Future  -     Sedimentation rate; Future  -     C-Reactive Protein; Future  -     TSH; Future  -     T4, Free; Future  -     T3; Future  -     Hepatitis B Core Antibody, IgM; Future  -     Hepatitis B Surface Ab, Qualitative; Future  -     Hepatitis B Surface Antigen; Future  -     Hepatitis C Antibody; Future  -     Protein Electrophoresis, Serum; Future  -     ketorolac injection 60 mg  -      cyanocobalamin injection 1,000 mcg    Vitamin D deficiency  -     ergocalciferol (ERGOCALCIFEROL) 50,000 unit Cap; Take 1 capsule (50,000 Units total) by mouth every 7 days.  -     amLODIPine (NORVASC) 5 MG tablet; Take 1 tablet (5 mg total) by mouth once daily.  -     oxyCODONE-acetaminophen (PERCOCET)  mg per tablet; Take 1 tablet by mouth every 8 (eight) hours as needed for Pain.  -     ketorolac injection 60 mg  -     cyanocobalamin injection 1,000 mcg    Seronegative rheumatoid arthritis  -     amLODIPine (NORVASC) 5 MG tablet; Take 1 tablet (5 mg total) by mouth once daily.  -     cyclobenzaprine (FLEXERIL) 10 MG tablet; TAKE ONE TABLET BY MOUTH THREE TIMES DAILY AS NEEDED FOR MUSCLE SPASMS  -     oxyCODONE-acetaminophen (PERCOCET)  mg per tablet; Take 1 tablet by mouth every 8 (eight) hours as needed for Pain.  -     ketorolac injection 60 mg  -     cyanocobalamin injection 1,000 mcg    Immunocompromised  -     amLODIPine (NORVASC) 5 MG tablet; Take 1 tablet (5 mg total) by mouth once daily.  -     ketorolac injection 60 mg  -     cyanocobalamin injection 1,000 mcg    Chronic pain syndrome  -     amLODIPine (NORVASC) 5 MG tablet; Take 1 tablet (5 mg total) by mouth once daily.  -     cyclobenzaprine (FLEXERIL) 10 MG tablet; TAKE ONE TABLET BY MOUTH THREE TIMES DAILY AS NEEDED FOR MUSCLE SPASMS  -     oxyCODONE-acetaminophen (PERCOCET)  mg per tablet; Take 1 tablet by mouth every 8 (eight) hours as needed for Pain.  -     oxyCODONE-acetaminophen (PERCOCET)  mg per tablet; Take 1 tablet by mouth every 8 (eight) hours as needed for Pain.  -     ketorolac injection 60 mg  -     cyanocobalamin injection 1,000 mcg    Cough  -     amLODIPine (NORVASC) 5 MG tablet; Take 1 tablet (5 mg total) by mouth once daily.  -     oxyCODONE-acetaminophen (PERCOCET)  mg per tablet; Take 1 tablet by mouth every 8 (eight) hours as needed for Pain.  -     ketorolac injection 60 mg  -     cyanocobalamin  injection 1,000 mcg    Fibromyalgia  -     amLODIPine (NORVASC) 5 MG tablet; Take 1 tablet (5 mg total) by mouth once daily.  -     oxyCODONE-acetaminophen (PERCOCET)  mg per tablet; Take 1 tablet by mouth every 8 (eight) hours as needed for Pain.  -     oxyCODONE-acetaminophen (PERCOCET)  mg per tablet; Take 1 tablet by mouth every 8 (eight) hours as needed for Pain.  -     cyanocobalamin injection 1,000 mcg    Hypertension, unspecified type  -     amLODIPine (NORVASC) 5 MG tablet; Take 1 tablet (5 mg total) by mouth once daily.    Hyperlipidemia, unspecified hyperlipidemia type  -     atorvastatin (LIPITOR) 20 MG tablet; Take 1 tablet (20 mg total) by mouth once daily.    Nonintractable headache, unspecified chronicity pattern, unspecified headache type    Anxiety  -     busPIRone (BUSPAR) 10 MG tablet; Take 1 tablet (10 mg total) by mouth 3 (three) times daily.    Other orders  -     oxyCODONE-acetaminophen (PERCOCET)  mg per tablet; Take 1 tablet by mouth every 8 (eight) hours as needed for Pain.    More than 50% of the  40 minute encounter was spent face to face counseling the patient regarding current status and future plan of care as well as side of the medications. All questions were answered to patient's satisfaction  Ordered  dexa  Perocet ordered . I have  check [place] prescription monitoring program site and no unusual or abnormal behavior has occured

## 2022-12-09 NOTE — PROGRESS NOTES
2 pt identifiers used  Allergies reviewed      Administered 2 cc ( 30 mg/ml ) of toradol to the right upper outer gluteal. Patient tolerated injections well. Informed of s/s to report verbalized understanding. No adverse reactions noted..    Administered 1 cc ( 1000 mcg/ml ) of b12 to the right ventrogluteal. Informed of s/s to report verbalized understanding. No adverse reactions noted.       Left facility in stable condition

## 2023-01-13 DIAGNOSIS — L40.50 PSORIATIC ARTHRITIS: ICD-10-CM

## 2023-01-13 NOTE — TELEPHONE ENCOUNTER
Pharmacy requesting refill on Albuterol inhaler  Pt's LOV 12/09/2022  Pt's NOV 04/06/2023  Medication pending

## 2023-01-15 RX ORDER — ALBUTEROL SULFATE 90 UG/1
2 AEROSOL, METERED RESPIRATORY (INHALATION) EVERY 6 HOURS PRN
Qty: 18 G | Refills: 3 | Status: SHIPPED | OUTPATIENT
Start: 2023-01-15 | End: 2024-03-04

## 2023-03-08 DIAGNOSIS — M06.00 SERONEGATIVE RHEUMATOID ARTHRITIS: ICD-10-CM

## 2023-03-08 DIAGNOSIS — M79.7 FIBROMYALGIA: ICD-10-CM

## 2023-03-08 DIAGNOSIS — L40.50 PSORIATIC ARTHRITIS: ICD-10-CM

## 2023-03-08 DIAGNOSIS — G89.4 CHRONIC PAIN SYNDROME: ICD-10-CM

## 2023-03-10 NOTE — TELEPHONE ENCOUNTER
----- Message from Sherita Fair sent at 3/10/2023  2:13 PM CST -----  Contact: self  Type: RX Refill Request        Who Called: patient  Refill or New Rx: refill  RX Name and Strength:   oxyCODONE-acetaminophen (PERCOCET)  mg per tablet  omeprazole (PRILOSEC) 40 MG capsule  How is the patient currently taking it? (ex. 1XDay): as directed   Is this a 30 day or 90 day RX: n/a  Preferred Pharmacy with phone number:   [facility] [address], [address] [facility]  [address] [facility] [address], [address] [address]  Phone: [phone] Fax: [phone]  Local or Mail Order: local   Ordering Provider: Dr. Jennifer Bang Call Back Number: [phone]  Additional Information: The patient needs t get these refilled. [facility] already sent faxes over. Thanks

## 2023-03-11 RX ORDER — OXYCODONE AND ACETAMINOPHEN 10; 325 MG/1; MG/1
1 TABLET ORAL EVERY 8 HOURS PRN
Qty: 90 TABLET | Refills: 0 | Status: SHIPPED | OUTPATIENT
Start: 2023-03-11 | End: 2023-04-06 | Stop reason: SDUPTHER

## 2023-03-13 DIAGNOSIS — B96.89 ACUTE BACTERIAL BRONCHITIS: ICD-10-CM

## 2023-03-13 DIAGNOSIS — G89.4 CHRONIC PAIN SYNDROME: ICD-10-CM

## 2023-03-13 DIAGNOSIS — R05.9 COUGH: ICD-10-CM

## 2023-03-13 DIAGNOSIS — M79.7 FIBROMYALGIA: ICD-10-CM

## 2023-03-13 DIAGNOSIS — E55.9 VITAMIN D DEFICIENCY: ICD-10-CM

## 2023-03-13 DIAGNOSIS — J20.8 ACUTE BACTERIAL BRONCHITIS: ICD-10-CM

## 2023-03-13 DIAGNOSIS — L40.50 PSORIATIC ARTHRITIS: ICD-10-CM

## 2023-03-13 DIAGNOSIS — M06.00 SERONEGATIVE RHEUMATOID ARTHRITIS: ICD-10-CM

## 2023-03-13 DIAGNOSIS — M17.0 PRIMARY OSTEOARTHRITIS OF BOTH KNEES: ICD-10-CM

## 2023-03-13 DIAGNOSIS — D84.9 IMMUNOCOMPROMISED: ICD-10-CM

## 2023-03-17 RX ORDER — LIDOCAINE AND PRILOCAINE 25; 25 MG/G; MG/G
CREAM TOPICAL
Qty: 30 G | Refills: 3 | Status: SHIPPED | OUTPATIENT
Start: 2023-03-17 | End: 2024-03-04 | Stop reason: SDUPTHER

## 2023-03-17 RX ORDER — PREGABALIN 50 MG/1
CAPSULE ORAL
Qty: 90 CAPSULE | Refills: 5 | Status: SHIPPED | OUTPATIENT
Start: 2023-03-17 | End: 2023-08-09

## 2023-03-31 DIAGNOSIS — R51.9 NONINTRACTABLE HEADACHE, UNSPECIFIED CHRONICITY PATTERN, UNSPECIFIED HEADACHE TYPE: ICD-10-CM

## 2023-04-06 ENCOUNTER — OFFICE VISIT (OUTPATIENT)
Dept: RHEUMATOLOGY | Facility: CLINIC | Age: 63
End: 2023-04-06
Payer: MEDICARE

## 2023-04-06 VITALS
HEIGHT: 60 IN | BODY MASS INDEX: 21.25 KG/M2 | DIASTOLIC BLOOD PRESSURE: 97 MMHG | WEIGHT: 108.25 LBS | SYSTOLIC BLOOD PRESSURE: 156 MMHG | HEART RATE: 88 BPM

## 2023-04-06 DIAGNOSIS — L40.50 PSORIATIC ARTHRITIS: Primary | ICD-10-CM

## 2023-04-06 DIAGNOSIS — R51.9 NONINTRACTABLE HEADACHE, UNSPECIFIED CHRONICITY PATTERN, UNSPECIFIED HEADACHE TYPE: ICD-10-CM

## 2023-04-06 DIAGNOSIS — L40.50 PSORIATIC ARTHRITIS: ICD-10-CM

## 2023-04-06 DIAGNOSIS — M06.00 SERONEGATIVE RHEUMATOID ARTHRITIS: ICD-10-CM

## 2023-04-06 DIAGNOSIS — F51.01 PRIMARY INSOMNIA: ICD-10-CM

## 2023-04-06 DIAGNOSIS — F32.A ANXIETY AND DEPRESSION: ICD-10-CM

## 2023-04-06 DIAGNOSIS — J30.2 SEASONAL ALLERGIES: ICD-10-CM

## 2023-04-06 DIAGNOSIS — M79.7 FIBROMYALGIA: ICD-10-CM

## 2023-04-06 DIAGNOSIS — G89.4 CHRONIC PAIN SYNDROME: ICD-10-CM

## 2023-04-06 DIAGNOSIS — E03.9 HYPOTHYROIDISM, UNSPECIFIED TYPE: ICD-10-CM

## 2023-04-06 DIAGNOSIS — F41.9 ANXIETY AND DEPRESSION: ICD-10-CM

## 2023-04-06 DIAGNOSIS — K21.9 GASTROESOPHAGEAL REFLUX DISEASE WITHOUT ESOPHAGITIS: ICD-10-CM

## 2023-04-06 DIAGNOSIS — N39.0 URINARY TRACT INFECTION WITHOUT HEMATURIA, SITE UNSPECIFIED: ICD-10-CM

## 2023-04-06 DIAGNOSIS — E55.9 VITAMIN D DEFICIENCY: ICD-10-CM

## 2023-04-06 DIAGNOSIS — G47.00 INSOMNIA, UNSPECIFIED TYPE: ICD-10-CM

## 2023-04-06 PROCEDURE — 99999 PR PBB SHADOW E&M-EST. PATIENT-LVL V: ICD-10-PCS | Mod: PBBFAC,,, | Performed by: PHYSICIAN ASSISTANT

## 2023-04-06 PROCEDURE — 99215 OFFICE O/P EST HI 40 MIN: CPT | Mod: 25,S$PBB,, | Performed by: PHYSICIAN ASSISTANT

## 2023-04-06 PROCEDURE — 96372 THER/PROPH/DIAG INJ SC/IM: CPT | Mod: PBBFAC,PN

## 2023-04-06 PROCEDURE — 99215 PR OFFICE/OUTPT VISIT, EST, LEVL V, 40-54 MIN: ICD-10-PCS | Mod: 25,S$PBB,, | Performed by: PHYSICIAN ASSISTANT

## 2023-04-06 PROCEDURE — 99215 OFFICE O/P EST HI 40 MIN: CPT | Mod: PBBFAC,PN | Performed by: PHYSICIAN ASSISTANT

## 2023-04-06 PROCEDURE — 99999 PR PBB SHADOW E&M-EST. PATIENT-LVL V: CPT | Mod: PBBFAC,,, | Performed by: PHYSICIAN ASSISTANT

## 2023-04-06 RX ORDER — QUETIAPINE FUMARATE 50 MG/1
150 TABLET, FILM COATED ORAL NIGHTLY
Qty: 270 TABLET | Refills: 1 | Status: SHIPPED | OUTPATIENT
Start: 2023-04-06 | End: 2023-09-08 | Stop reason: SDUPTHER

## 2023-04-06 RX ORDER — HYDROXYCHLOROQUINE SULFATE 200 MG/1
200 TABLET, FILM COATED ORAL DAILY
Qty: 90 TABLET | Refills: 3 | Status: SHIPPED | OUTPATIENT
Start: 2023-04-06 | End: 2024-03-27 | Stop reason: SDUPTHER

## 2023-04-06 RX ORDER — OMEPRAZOLE 40 MG/1
40 CAPSULE, DELAYED RELEASE ORAL DAILY
Qty: 90 CAPSULE | Refills: 1 | Status: SHIPPED | OUTPATIENT
Start: 2023-04-06

## 2023-04-06 RX ORDER — UPADACITINIB 15 MG/1
15 TABLET, EXTENDED RELEASE ORAL DAILY
Qty: 30 TABLET | Refills: 6 | Status: SHIPPED | OUTPATIENT
Start: 2023-04-06 | End: 2023-08-09

## 2023-04-06 RX ORDER — PAROXETINE HYDROCHLORIDE 40 MG/1
40 TABLET, FILM COATED ORAL DAILY
Qty: 90 TABLET | Refills: 1 | Status: SHIPPED | OUTPATIENT
Start: 2023-04-06 | End: 2023-09-08 | Stop reason: SDUPTHER

## 2023-04-06 RX ORDER — CYCLOBENZAPRINE HCL 10 MG
TABLET ORAL
Qty: 270 TABLET | Refills: 1 | Status: SHIPPED | OUTPATIENT
Start: 2023-04-06 | End: 2023-12-14 | Stop reason: SDUPTHER

## 2023-04-06 RX ORDER — ASPIRIN 81 MG/1
81 TABLET ORAL DAILY
COMMUNITY
Start: 2023-01-31

## 2023-04-06 RX ORDER — LEVOTHYROXINE SODIUM 50 UG/1
50 TABLET ORAL
Qty: 90 TABLET | Refills: 1 | Status: SHIPPED | OUTPATIENT
Start: 2023-04-06 | End: 2023-12-14 | Stop reason: SDUPTHER

## 2023-04-06 RX ORDER — LEVOCETIRIZINE DIHYDROCHLORIDE 5 MG/1
5 TABLET, FILM COATED ORAL NIGHTLY
Qty: 30 TABLET | Refills: 5 | Status: SHIPPED | OUTPATIENT
Start: 2023-04-06 | End: 2023-10-11 | Stop reason: SDUPTHER

## 2023-04-06 RX ORDER — KETOROLAC TROMETHAMINE 30 MG/ML
60 INJECTION, SOLUTION INTRAMUSCULAR; INTRAVENOUS
Status: COMPLETED | OUTPATIENT
Start: 2023-04-06 | End: 2023-04-06

## 2023-04-06 RX ORDER — ACETAMINOPHEN 500 MG
1 TABLET ORAL EVERY MORNING
COMMUNITY
Start: 2023-01-31

## 2023-04-06 RX ORDER — GABAPENTIN 300 MG/1
600 CAPSULE ORAL 2 TIMES DAILY
Qty: 360 CAPSULE | Refills: 1 | Status: SHIPPED | OUTPATIENT
Start: 2023-04-06 | End: 2023-09-08 | Stop reason: SDUPTHER

## 2023-04-06 RX ORDER — CYANOCOBALAMIN 1000 UG/ML
1000 INJECTION, SOLUTION INTRAMUSCULAR; SUBCUTANEOUS
Status: COMPLETED | OUTPATIENT
Start: 2023-04-06 | End: 2023-04-06

## 2023-04-06 RX ORDER — ERGOCALCIFEROL 1.25 MG/1
50000 CAPSULE ORAL
Qty: 12 CAPSULE | Refills: 3 | Status: SHIPPED | OUTPATIENT
Start: 2023-04-06 | End: 2023-12-14

## 2023-04-06 RX ORDER — ROSUVASTATIN CALCIUM 20 MG/1
TABLET, COATED ORAL
COMMUNITY
Start: 2023-01-31 | End: 2023-05-11

## 2023-04-06 RX ORDER — NITROFURANTOIN 25; 75 MG/1; MG/1
100 CAPSULE ORAL 2 TIMES DAILY
Qty: 14 CAPSULE | Refills: 0 | Status: SHIPPED | OUTPATIENT
Start: 2023-04-06 | End: 2023-04-13

## 2023-04-06 RX ADMIN — CYANOCOBALAMIN 1000 MCG: 1000 INJECTION, SOLUTION INTRAMUSCULAR at 02:04

## 2023-04-06 RX ADMIN — KETOROLAC TROMETHAMINE 60 MG: 60 INJECTION, SOLUTION INTRAMUSCULAR at 02:04

## 2023-04-06 ASSESSMENT — ROUTINE ASSESSMENT OF PATIENT INDEX DATA (RAPID3)
MDHAQ FUNCTION SCORE: 1.1
PAIN SCORE: 6.5
PSYCHOLOGICAL DISTRESS SCORE: 2.2
PATIENT GLOBAL ASSESSMENT SCORE: 6.5
TOTAL RAPID3 SCORE: 5.55
FATIGUE SCORE: 1.1

## 2023-04-06 NOTE — PROGRESS NOTES
Subjective:       Patient ID: Barbara Mims is a 62 y.o. female.    Chief Complaint: Disease Management    Mrs. Mims is a 62 year old female who presents to clinic for follow up on psoriatic arthritis, seronegative RA, and pain management. She is doing poorly--reports increased pain and swelling in her PIP and DIP joints. She has destructive changes in her fingernails. She complains of increased pain in her feet (MTPs) and bottom of her first when she first puts weight on her feet in the morning. She has been compliant with plaquenil and prednisone 5 mg daily. She is ready to start Rinvoq--she was previously hesistant due to frequent pulmonary infections. Last CT chest consistent with ground glass opacities. She has been unable to est care with provider in [place] and she does not have transportation to another city for evaluation. I recommended she consider [facility].    Sleep is stable on seroquel and sonata.     She has chronic neck pain s/p cervical surgery and is no longer followed by pain management after multiple unsuccessful interventional treatments. She is on norco and fioricet PRN for severe pain. She is taking gabapentin TID and occasionally adds lyrica 50 mg for severe pain.    Current treatment:  1. Norco 10/325 TID PRN for pain  2. OTC ibuprofen 400 mg daily  3. Prednisone 5 mg daily  4. plaquenil    Review of Systems   Constitutional: Positive for activity change. Negative for appetite change, chills, fatigue and fever.   Eyes: Negative for visual disturbance.   Respiratory: Positive for shortness of breath (chronic, stable)  Cardiovascular: Negative for chest pain, palpitations and leg swelling.   Gastrointestinal: Negative for abdominal pain.   GYN: Positive for dysuria  Musculoskeletal: Positive for arthralgias, joint swelling, neck pain and neck stiffness.   Neurological: Negative for dizziness, weakness, light-headedness and headaches.   Psychiatric/Behavioral: Negative for  dysphoric mood. The patient is not nervous/anxious.          Objective:     Vitals:    04/06/23 1254   BP: (!) 156/97   Pulse: 88         Past Medical History:   Diagnosis Date    Alopecia     Anemia     Anxiety     Arthritis     Cardiac angina syndrome     Chronic kidney disease     kidney stones    Degenerative disc disease     Depression     Dry eyes     Dry mouth     Hyperlipidemia     Hypertension     Kidney stones     Mitral valve prolapse     Thyroid disease      Past Surgical History:   Procedure Laterality Date    CARDIAC CATHETERIZATION      CERVICAL DISC SURGERY      HYSTERECTOMY      LITHOTRIPSY            Physical Exam   Constitutional: She is well-developed, well-nourished, and in no distress.   Eyes: Right conjunctiva is not injected. Left conjunctiva is not injected.   Neck: No JVD present. Muscular tenderness present. No spinous process tenderness present. Decreased range of motion present. No thyromegaly present.   Cardiovascular: Normal rate and regular rhythm.  Exam reveals no decreased pulses.    Pulmonary/Chest: normal effort      Right Side Rheumatological Exam     Examination finds the shoulder, elbow, wrist, knee, 4th MCP and 5th MCP normal.    The patient is tender to palpation of the 1st PIP, 1st MCP, 2nd PIP, 2nd MCP, 3rd PIP, 3rd MCP, 4th PIP and 5th PIP    The patient has an enlarged and swollen 1st PIP, 2nd PIP, 3rd PIP, 4th PIP, and 5th PIP, 1-5th DIPs    Left Side Rheumatological Exam     Examination finds the shoulder, elbow, wrist, knee, 1st MCP, 2nd MCP, 3rd MCP, 4th MCP and 5th MCP normal.    The patient is tender to palpation of the 1st PIP, 2nd PIP, 3rd PIP, 4th PIP and 5th PIP.    The patient has an enlarged and swollen 1st PIP, 2nd PIP, 3rd PIP, 4th PIP, and 5th PIP, 1-5th DIPs    Lymphadenopathy:     She has no cervical adenopathy.   Neurological: Gait normal.   Skin:   +psoriatic nail changes   Psychiatric: Mood and affect normal.       External labs reviewed from Primary  care NP  Assessment:       1. Psoriatic arthritis    2. Vitamin D deficiency    3. Seasonal allergies    4. Seronegative rheumatoid arthritis    5. Hypothyroidism, unspecified type    6. Gastroesophageal reflux disease without esophagitis    7. Anxiety and depression    8. Insomnia, unspecified type    9. Chronic pain syndrome    10. Urinary tract infection without hematuria, site unspecified              Plan:       Psoriatic arthritis  -     gabapentin (NEURONTIN) 300 MG capsule; Take 2 capsules (600 mg total) by mouth 2 (two) times daily.  Dispense: 360 capsule; Refill: 1  -     hydrOXYchloroQUINE (PLAQUENIL) 200 mg tablet; Take 1 tablet (200 mg total) by mouth once daily.  Dispense: 90 tablet; Refill: 3  -     cyclobenzaprine (FLEXERIL) 10 MG tablet; TAKE ONE TABLET BY MOUTH THREE TIMES DAILY AS NEEDED FOR MUSCLE SPASMS  Dispense: 270 tablet; Refill: 1  -     Quantiferon Gold TB; Future; Expected date: 04/06/2023  -     ketorolac injection 60 mg  -     cyanocobalamin injection 1,000 mcg  -     upadacitinib (RINVOQ) 15 mg 24 hr tablet; Take 1 tablet (15 mg total) by mouth once daily.  Dispense: 30 tablet; Refill: 6    Vitamin D deficiency  -     ergocalciferol (ERGOCALCIFEROL) 50,000 unit Cap; Take 1 capsule (50,000 Units total) by mouth every 7 days.  Dispense: 12 capsule; Refill: 3    Seasonal allergies  -     levocetirizine (XYZAL) 5 MG tablet; Take 1 tablet (5 mg total) by mouth every evening.  Dispense: 30 tablet; Refill: 5    Seronegative rheumatoid arthritis  -     gabapentin (NEURONTIN) 300 MG capsule; Take 2 capsules (600 mg total) by mouth 2 (two) times daily.  Dispense: 360 capsule; Refill: 1  -     hydrOXYchloroQUINE (PLAQUENIL) 200 mg tablet; Take 1 tablet (200 mg total) by mouth once daily.  Dispense: 90 tablet; Refill: 3  -     cyclobenzaprine (FLEXERIL) 10 MG tablet; TAKE ONE TABLET BY MOUTH THREE TIMES DAILY AS NEEDED FOR MUSCLE SPASMS  Dispense: 270 tablet; Refill: 1  -     Quantiferon Gold TB;  Future; Expected date: 04/06/2023  -     upadacitinib (RINVOQ) 15 mg 24 hr tablet; Take 1 tablet (15 mg total) by mouth once daily.  Dispense: 30 tablet; Refill: 6    Hypothyroidism, unspecified type  -     levothyroxine (SYNTHROID) 50 MCG tablet; Take 1 tablet (50 mcg total) by mouth before breakfast.  Dispense: 90 tablet; Refill: 1    Gastroesophageal reflux disease without esophagitis  -     omeprazole (PRILOSEC) 40 MG capsule; Take 1 capsule (40 mg total) by mouth once daily.  Dispense: 90 capsule; Refill: 1    Anxiety and depression  -     paroxetine (PAXIL) 40 MG tablet; Take 1 tablet (40 mg total) by mouth once daily.  Dispense: 90 tablet; Refill: 1    Insomnia, unspecified type  -     QUEtiapine (SEROQUEL) 50 MG tablet; Take 3 tablets (150 mg total) by mouth every evening.  Dispense: 270 tablet; Refill: 1    Chronic pain syndrome  -     cyclobenzaprine (FLEXERIL) 10 MG tablet; TAKE ONE TABLET BY MOUTH THREE TIMES DAILY AS NEEDED FOR MUSCLE SPASMS  Dispense: 270 tablet; Refill: 1    Urinary tract infection without hematuria, site unspecified  -     nitrofurantoin, macrocrystal-monohydrate, (MACROBID) 100 MG capsule; Take 1 capsule (100 mg total) by mouth 2 (two) times daily. for 7 days  Dispense: 14 capsule; Refill: 0          Assessment:  62 year old female with   Psoriatic arthritis, psoriasis, seronegative RA, fibromyalgia   --chronic neck pain s/p cervical surgery, failed pain management interventions  --chronic pain syndrome on norco  --chronic insomnia on seroquel  --fibromyalgia  --vitamin D deficiency  --macrocytosis     Plan:  1. Toradol 60, b12  2. Macrobid for UTI  3. Monitor BP at home and keep a log. Notify clinic if BP is persistently >140/90.    4. Start Rinvoq 15 mg daily (once uti has resolved)  5. Cont plaquenil 200 mg daily, prednisone 5-10 mg daily prn  6. Cont flexeril, gabapentin  7. Cont seroquel, sonata per Dr. Rodriguez  8. Cont percocet per MD.  I have checked [place] prescription  monitoring program site and no unusual or abnormal behavior has occurred pt understand the risk and benefits of taking opioid medications and has decided to continue the medication.  9. Add xyzal  10. Check labs soon  11. Exercises for plantar fasciitis printed    Follow up:   3-4 mo Dr. Rodriguez

## 2023-04-07 RX ORDER — BUTALBITAL, ACETAMINOPHEN AND CAFFEINE 50; 325; 40 MG/1; MG/1; MG/1
TABLET ORAL
Qty: 120 TABLET | Refills: 3 | Status: SHIPPED | OUTPATIENT
Start: 2023-04-07 | End: 2023-12-14 | Stop reason: SDUPTHER

## 2023-04-08 RX ORDER — OXYCODONE AND ACETAMINOPHEN 10; 325 MG/1; MG/1
1 TABLET ORAL EVERY 8 HOURS PRN
Qty: 90 TABLET | Refills: 0 | Status: SHIPPED | OUTPATIENT
Start: 2023-05-12 | End: 2023-07-11

## 2023-04-08 RX ORDER — ZALEPLON 10 MG/1
10 CAPSULE ORAL NIGHTLY
Qty: 30 CAPSULE | Refills: 3 | Status: SHIPPED | OUTPATIENT
Start: 2023-04-08 | End: 2023-08-09 | Stop reason: SDUPTHER

## 2023-04-08 RX ORDER — OXYCODONE AND ACETAMINOPHEN 10; 325 MG/1; MG/1
1 TABLET ORAL EVERY 8 HOURS PRN
Qty: 90 TABLET | Refills: 0 | Status: SHIPPED | OUTPATIENT
Start: 2023-06-11 | End: 2023-07-11

## 2023-04-08 RX ORDER — OXYCODONE AND ACETAMINOPHEN 10; 325 MG/1; MG/1
1 TABLET ORAL EVERY 8 HOURS PRN
Qty: 90 TABLET | Refills: 0 | Status: SHIPPED | OUTPATIENT
Start: 2023-04-12 | End: 2023-07-11

## 2023-04-08 RX ORDER — BUTALBITAL, ACETAMINOPHEN AND CAFFEINE 50; 325; 40 MG/1; MG/1; MG/1
TABLET ORAL
Qty: 120 TABLET | Refills: 3 | Status: SHIPPED | OUTPATIENT
Start: 2023-04-08 | End: 2023-08-09 | Stop reason: SDUPTHER

## 2023-04-13 ENCOUNTER — SPECIALTY PHARMACY (OUTPATIENT)
Dept: PHARMACY | Facility: CLINIC | Age: 63
End: 2023-04-13

## 2023-04-13 ENCOUNTER — TELEPHONE (OUTPATIENT)
Dept: RHEUMATOLOGY | Facility: CLINIC | Age: 63
End: 2023-04-13
Payer: MEDICARE

## 2023-04-13 DIAGNOSIS — L40.50 PSORIATIC ARTHRITIS: Primary | ICD-10-CM

## 2023-04-13 DIAGNOSIS — M06.00 SERONEGATIVE RHEUMATOID ARTHRITIS: Primary | ICD-10-CM

## 2023-04-13 NOTE — TELEPHONE ENCOUNTER
----- Message from Morena Caba, Laura sent at 4/13/2023 12:36 PM CDT -----  Regarding: Rinvoq  Good afternoon RUBINA Acharya has received the order for Ms. Zeng's Rinvoq.  The medication does not require prior authorization on her insurance.  Her last TB and Hep B test were drawn in 2020.  She does have the labs re-ordered.  Would you like her to have her labs drawn before restarting Rinvoq or can [facility] proceed with fill?    Thank you,  Morena Caba, PharmD  Clinical Pharmacist    [facility]  [address] [place] [address]  P [phone]  F [phone]

## 2023-04-13 NOTE — TELEPHONE ENCOUNTER
New order for Rinvoq received.  Pt is restarting.  No PA is required.  Pt's last TB and Hep B test were drawn in 2020 and labs are ordered.  Sending message to [facility] to confirm pt can start therapy or wait until labs are drawn.

## 2023-04-14 NOTE — TELEPHONE ENCOUNTER
MD responded back to message regarding safety labs.  MD would like pt have lab drawn prior to restarting Rinvoq.  VERONICA has reached out to pt to schedule lab appt.

## 2023-04-14 NOTE — TELEPHONE ENCOUNTER
Lorne, this is Morena Caba with [facility].  We are working on your prescription that your doctor has sent us. We will be working with your insurance to get this approved for you. We will be calling you along the way with updates on your medication.  If you have any questions, you can reach us at [phone].    Welcome call outcome: No answer/Unable to leave voicemail    Outgoing call to pt to inform her that MD would like her to have TB and Hep B labs drawn before restarting Rinvoq.  No answer, no VM set up.

## 2023-04-14 NOTE — TELEPHONE ENCOUNTER
Patient returned call regarding Rinvoq restart. Informed patient that she will need to complete labs before she is able to restart. Patient acknowledged, she will reach out to her local [facility] facility to complete labs.

## 2023-04-21 NOTE — TELEPHONE ENCOUNTER
Outgoing call to pt to confirm when she will go have safety labs drawn.  Pt stated she will call to make appt for some time next week but unsure of which day.  Informed pt will monitor her chart for results and contact her once results are loaded into chart.  Pt voiced understanding.

## 2023-05-24 NOTE — TELEPHONE ENCOUNTER
Outgoing call to confirm with pt when she will have safety labs drawn.  No answer, no VM set up.    Multiple call attempts reached.  Sending message to [facility] and closing pt out.

## 2023-06-07 ENCOUNTER — TELEPHONE (OUTPATIENT)
Dept: RHEUMATOLOGY | Facility: CLINIC | Age: 63
End: 2023-06-07

## 2023-06-07 NOTE — TELEPHONE ENCOUNTER
Contacted patient regarding rinvoq. Patient stated she is staying congested and has a cough. Stated she was told not to take rinvoq when she is sick. Patient asked [facility] to call back next month and see if she is over URI.

## 2023-06-07 NOTE — TELEPHONE ENCOUNTER
Please call her to see if interested in taking Rinvoq--[facility] has not been able to get in touch regarding rinvoq.

## 2023-07-11 DIAGNOSIS — M79.7 FIBROMYALGIA: ICD-10-CM

## 2023-07-11 DIAGNOSIS — G89.4 CHRONIC PAIN SYNDROME: ICD-10-CM

## 2023-07-11 DIAGNOSIS — L40.50 PSORIATIC ARTHRITIS: ICD-10-CM

## 2023-07-11 DIAGNOSIS — M06.00 SERONEGATIVE RHEUMATOID ARTHRITIS: ICD-10-CM

## 2023-07-11 RX ORDER — OXYCODONE AND ACETAMINOPHEN 10; 325 MG/1; MG/1
1 TABLET ORAL EVERY 8 HOURS PRN
Qty: 90 TABLET | Refills: 0 | Status: SHIPPED | OUTPATIENT
Start: 2023-07-11 | End: 2023-08-09

## 2023-08-09 ENCOUNTER — OFFICE VISIT (OUTPATIENT)
Dept: RHEUMATOLOGY | Facility: CLINIC | Age: 63
End: 2023-08-09
Payer: MEDICARE

## 2023-08-09 VITALS
HEART RATE: 90 BPM | SYSTOLIC BLOOD PRESSURE: 108 MMHG | WEIGHT: 100.5 LBS | HEIGHT: 60 IN | DIASTOLIC BLOOD PRESSURE: 74 MMHG | BODY MASS INDEX: 19.73 KG/M2

## 2023-08-09 DIAGNOSIS — F41.9 ANXIETY DISORDER, UNSPECIFIED TYPE: ICD-10-CM

## 2023-08-09 DIAGNOSIS — M79.7 FIBROMYALGIA: ICD-10-CM

## 2023-08-09 DIAGNOSIS — F51.01 PRIMARY INSOMNIA: ICD-10-CM

## 2023-08-09 DIAGNOSIS — R63.4 WEIGHT LOSS: ICD-10-CM

## 2023-08-09 DIAGNOSIS — I73.00 RAYNAUD'S DISEASE WITHOUT GANGRENE: ICD-10-CM

## 2023-08-09 DIAGNOSIS — G89.4 CHRONIC PAIN SYNDROME: ICD-10-CM

## 2023-08-09 DIAGNOSIS — L40.8 PSORIASIS WITH PUSTULES: ICD-10-CM

## 2023-08-09 DIAGNOSIS — E78.5 HYPERLIPIDEMIA, UNSPECIFIED HYPERLIPIDEMIA TYPE: ICD-10-CM

## 2023-08-09 DIAGNOSIS — F41.9 ANXIETY AND DEPRESSION: ICD-10-CM

## 2023-08-09 DIAGNOSIS — R51.9 NONINTRACTABLE HEADACHE, UNSPECIFIED CHRONICITY PATTERN, UNSPECIFIED HEADACHE TYPE: ICD-10-CM

## 2023-08-09 DIAGNOSIS — E55.9 VITAMIN D DEFICIENCY: ICD-10-CM

## 2023-08-09 DIAGNOSIS — F17.200 TOBACCO DEPENDENCE: ICD-10-CM

## 2023-08-09 DIAGNOSIS — F41.9 ANXIETY: ICD-10-CM

## 2023-08-09 DIAGNOSIS — F32.A ANXIETY AND DEPRESSION: ICD-10-CM

## 2023-08-09 DIAGNOSIS — L40.50 PSORIATIC ARTHRITIS: ICD-10-CM

## 2023-08-09 DIAGNOSIS — G47.00 INSOMNIA, UNSPECIFIED TYPE: ICD-10-CM

## 2023-08-09 DIAGNOSIS — F11.90 CHRONIC, CONTINUOUS USE OF OPIOIDS: ICD-10-CM

## 2023-08-09 DIAGNOSIS — M06.00 SERONEGATIVE RHEUMATOID ARTHRITIS: ICD-10-CM

## 2023-08-09 DIAGNOSIS — L40.50 PSORIATIC ARTHRITIS: Primary | ICD-10-CM

## 2023-08-09 DIAGNOSIS — J44.89 COPD (CHRONIC OBSTRUCTIVE PULMONARY DISEASE) WITH CHRONIC BRONCHITIS: ICD-10-CM

## 2023-08-09 DIAGNOSIS — F32.A DEPRESSION, UNSPECIFIED DEPRESSION TYPE: ICD-10-CM

## 2023-08-09 DIAGNOSIS — K21.9 GASTROESOPHAGEAL REFLUX DISEASE: ICD-10-CM

## 2023-08-09 DIAGNOSIS — E06.9 THYROIDITIS: ICD-10-CM

## 2023-08-09 DIAGNOSIS — R05.3 CHRONIC COUGH: ICD-10-CM

## 2023-08-09 PROCEDURE — 99999 PR PBB SHADOW E&M-EST. PATIENT-LVL IV: CPT | Mod: PBBFAC,,, | Performed by: INTERNAL MEDICINE

## 2023-08-09 PROCEDURE — 99999 PR PBB SHADOW E&M-EST. PATIENT-LVL IV: ICD-10-PCS | Mod: PBBFAC,,, | Performed by: INTERNAL MEDICINE

## 2023-08-09 PROCEDURE — 96372 THER/PROPH/DIAG INJ SC/IM: CPT | Mod: PBBFAC,59,PN

## 2023-08-09 PROCEDURE — 99999PBSHW PR PBB SHADOW TECHNICAL ONLY FILED TO HB: Mod: PBBFAC,,,

## 2023-08-09 PROCEDURE — 99214 OFFICE O/P EST MOD 30 MIN: CPT | Mod: PBBFAC,PN | Performed by: INTERNAL MEDICINE

## 2023-08-09 PROCEDURE — 99215 PR OFFICE/OUTPT VISIT, EST, LEVL V, 40-54 MIN: ICD-10-PCS | Mod: 25,S$PBB,, | Performed by: INTERNAL MEDICINE

## 2023-08-09 PROCEDURE — 99215 OFFICE O/P EST HI 40 MIN: CPT | Mod: 25,S$PBB,, | Performed by: INTERNAL MEDICINE

## 2023-08-09 PROCEDURE — 99999PBSHW PR PBB SHADOW TECHNICAL ONLY FILED TO HB: ICD-10-PCS | Mod: PBBFAC,,,

## 2023-08-09 RX ORDER — ZALEPLON 10 MG/1
10 CAPSULE ORAL NIGHTLY
Qty: 30 CAPSULE | Refills: 3 | Status: SHIPPED | OUTPATIENT
Start: 2023-08-09 | End: 2023-12-14

## 2023-08-09 RX ORDER — VARENICLINE TARTRATE 1 MG/1
1 TABLET, FILM COATED ORAL 2 TIMES DAILY
Qty: 60 TABLET | Refills: 5 | Status: SHIPPED | OUTPATIENT
Start: 2023-08-09 | End: 2023-12-14

## 2023-08-09 RX ORDER — FLUTICASONE FUROATE AND VILANTEROL 200; 25 UG/1; UG/1
1 POWDER RESPIRATORY (INHALATION)
COMMUNITY
End: 2023-12-18

## 2023-08-09 RX ORDER — VARENICLINE TARTRATE 0.5 (11)-1
KIT ORAL
Qty: 1 EACH | Refills: 0 | Status: SHIPPED | OUTPATIENT
Start: 2023-08-09 | End: 2023-12-14

## 2023-08-09 RX ORDER — DEXAMETHASONE SODIUM PHOSPHATE 4 MG/ML
8 INJECTION, SOLUTION INTRA-ARTICULAR; INTRALESIONAL; INTRAMUSCULAR; INTRAVENOUS; SOFT TISSUE
Status: COMPLETED | OUTPATIENT
Start: 2023-08-09 | End: 2023-08-09

## 2023-08-09 RX ORDER — FLUTICASONE FUROATE AND VILANTEROL 200; 25 UG/1; UG/1
1 POWDER RESPIRATORY (INHALATION) DAILY
Qty: 60 EACH | Refills: 12 | Status: SHIPPED | OUTPATIENT
Start: 2023-08-09 | End: 2023-12-18 | Stop reason: SDUPTHER

## 2023-08-09 RX ORDER — HYDROCODONE BITARTRATE AND ACETAMINOPHEN 10; 325 MG/1; MG/1
1 TABLET ORAL EVERY 8 HOURS PRN
Qty: 90 TABLET | Refills: 0 | Status: SHIPPED | OUTPATIENT
Start: 2023-10-04 | End: 2023-11-03

## 2023-08-09 RX ORDER — KETOROLAC TROMETHAMINE 30 MG/ML
60 INJECTION, SOLUTION INTRAMUSCULAR; INTRAVENOUS
Status: COMPLETED | OUTPATIENT
Start: 2023-08-09 | End: 2023-08-09

## 2023-08-09 RX ORDER — BUTALBITAL, ACETAMINOPHEN AND CAFFEINE 50; 325; 40 MG/1; MG/1; MG/1
TABLET ORAL
Qty: 120 TABLET | Refills: 3 | Status: SHIPPED | OUTPATIENT
Start: 2023-08-09 | End: 2023-11-24

## 2023-08-09 RX ORDER — HYDROCODONE BITARTRATE AND ACETAMINOPHEN 10; 325 MG/1; MG/1
1 TABLET ORAL EVERY 8 HOURS PRN
Qty: 90 TABLET | Refills: 0 | Status: SHIPPED | OUTPATIENT
Start: 2023-08-09 | End: 2023-09-08

## 2023-08-09 RX ORDER — SULFAMETHOXAZOLE AND TRIMETHOPRIM 800; 160 MG/1; MG/1
1 TABLET ORAL DAILY
Qty: 30 TABLET | Refills: 0 | Status: SHIPPED | OUTPATIENT
Start: 2023-08-09 | End: 2023-09-08

## 2023-08-09 RX ORDER — HYDROCODONE BITARTRATE AND ACETAMINOPHEN 10; 325 MG/1; MG/1
1 TABLET ORAL EVERY 8 HOURS PRN
Qty: 90 TABLET | Refills: 0 | Status: SHIPPED | OUTPATIENT
Start: 2023-09-06 | End: 2023-10-06

## 2023-08-09 RX ORDER — CYPROHEPTADINE HYDROCHLORIDE 4 MG/1
4 TABLET ORAL NIGHTLY
Qty: 30 TABLET | Refills: 3 | Status: SHIPPED | OUTPATIENT
Start: 2023-08-09 | End: 2023-11-24

## 2023-08-09 RX ORDER — ROSUVASTATIN CALCIUM 20 MG/1
20 TABLET, COATED ORAL NIGHTLY
COMMUNITY
Start: 2023-05-30

## 2023-08-09 RX ADMIN — DEXAMETHASONE SODIUM PHOSPHATE 8 MG: 4 INJECTION INTRA-ARTICULAR; INTRALESIONAL; INTRAMUSCULAR; INTRAVENOUS; SOFT TISSUE at 11:08

## 2023-08-09 RX ADMIN — KETOROLAC TROMETHAMINE 60 MG: 60 INJECTION, SOLUTION INTRAMUSCULAR at 11:08

## 2023-08-09 ASSESSMENT — ROUTINE ASSESSMENT OF PATIENT INDEX DATA (RAPID3)
FATIGUE SCORE: 1.1
TOTAL RAPID3 SCORE: 5.78
PSYCHOLOGICAL DISTRESS SCORE: 3.3
PATIENT GLOBAL ASSESSMENT SCORE: 6.5
MDHAQ FUNCTION SCORE: 1
PAIN SCORE: 7.5

## 2023-08-09 NOTE — PROGRESS NOTES
Subjective:      Patient ID: Barbara Mims is a 63 y.o. female.    Chief Complaint: Disease Management    Mrs. Mims is a 62 year old female who presents to clinic for follow up on psoriatic arthritis, seronegative RA, and pain management. She never start rinvoq due to chronic sinus and bronchitis.She is doing poorly--reports increased pain and swelling in her PIP and DIP joints. She has destructive changes in her fingernails. She complains of increased pain in her feet (MTPs) and bottom of her first when she first puts weight on her feet in the morning. She has been compliant with plaquenil and prednisone 5 mg daily. Cough with sputum  tried perocet and it caused nausea  Sleep is stable on seroquel and sonata.     She has chronic neck pain s/p cervical surgery and is no longer followed by pain management after multiple unsuccessful interventional treatments. She is on norco and fioricet PRN for severe pain. She is taking gabapentin TID and occasionally adds lyrica 50 mg for severe pain.    Current treatment:  1. Norco 10/325 TID PRN for pain  2. OTC ibuprofen 400 mg daily  3. Prednisone 5 mg daily  4. plaquenil        Review of Systems   Constitutional:  Positive for chills and fatigue. Negative for activity change, appetite change, diaphoresis, fever and unexpected weight change.   HENT:  Negative for congestion, dental problem, ear discharge, ear pain, facial swelling, mouth sores, nosebleeds, postnasal drip, rhinorrhea, sinus pressure, sneezing, sore throat, tinnitus, trouble swallowing and voice change.    Eyes:  Negative for photophobia, pain, discharge, redness and itching.   Respiratory:  Positive for cough. Negative for apnea, chest tightness, shortness of breath and wheezing.    Cardiovascular:  Positive for leg swelling. Negative for chest pain and palpitations.   Gastrointestinal:  Positive for abdominal pain. Negative for abdominal distention, constipation, diarrhea, nausea and vomiting.    Endocrine: Negative for cold intolerance, heat intolerance, polydipsia and polyuria.   Genitourinary:  Negative for decreased urine volume, difficulty urinating, dysuria, flank pain, frequency, hematuria and urgency.   Musculoskeletal:  Positive for arthralgias, back pain, gait problem, joint swelling, myalgias, neck pain and neck stiffness.   Skin:  Negative for pallor, rash and wound.   Allergic/Immunologic: Negative for immunocompromised state.   Neurological:  Negative for dizziness, tremors, numbness and headaches.   Hematological:  Negative for adenopathy. Does not bruise/bleed easily.   Psychiatric/Behavioral:  Negative for sleep disturbance. The patient is not nervous/anxious.         Objective:   /74   Pulse 90   Ht 5' (1.524 m)   Wt 45.6 kg (100 lb 8.5 oz)   BMI 19.63 kg/m²   Physical Exam   Constitutional: She is oriented to person, place, and time.   HENT:   Head: Normocephalic and atraumatic.   Mouth/Throat: Oropharynx is clear and moist.   Eyes: Pupils are equal, round, and reactive to light.   Neck: No thyromegaly present.   Cardiovascular: Normal rate, regular rhythm and normal heart sounds. Exam reveals no gallop and no friction rub.   No murmur heard.  Pulmonary/Chest: She has wheezes. She has rhonchi. She has no rales. She exhibits no tenderness.   Abdominal: There is no abdominal tenderness. There is no rebound and no guarding.   Musculoskeletal:         General: Swelling, tenderness and deformity present.      Right shoulder: Tenderness present.      Left shoulder: Tenderness present.      Right elbow: Normal.      Left elbow: Tenderness present.      Right wrist: Tenderness present.      Left wrist: Tenderness present.      Cervical back: Neck supple.      Right knee: Effusion present. Tenderness present.      Left knee: Effusion present. Tenderness present.   Lymphadenopathy:     She has no cervical adenopathy.   Neurological: She is alert and oriented to person, place, and time.  Gait normal.   Skin: Bruising and rash noted. There is erythema. There is pallor.   Psychiatric: Mood, memory, affect and judgment normal.   Vitals reviewed.      Right Side Rheumatological Exam     Examination finds the elbow normal.    The patient is tender to palpation of the shoulder, wrist, knee, 1st PIP, 1st MCP, 2nd PIP, 2nd MCP, 3rd PIP, 3rd MCP, 4th PIP, 4th MCP, 5th PIP and 5th MCP    Shoulder Exam   Tenderness Location: acromion    Range of Motion   Active abduction:  abnormal   Adduction: abnormal  Sensation: normal    Knee Exam   Tenderness Location: medial joint line and LCL  Patellofemoral Crepitus: positive  Effusion: positive  Sensation: normal    Hip Exam   Tenderness Location: posterior and lateral  Sensation: normal    Elbow/Wrist Exam   Tenderness Location: no tenderness  Sensation: normal    Left Side Rheumatological Exam     The patient is tender to palpation of the shoulder, elbow, wrist, knee, 1st PIP, 1st MCP, 2nd PIP, 2nd MCP, 3rd PIP, 3rd MCP, 4th PIP, 4th MCP, 5th PIP and 5th MCP.    Shoulder Exam   Tenderness Location: acromion    Range of Motion   Active abduction:  abnormal   Sensation: normal    Knee Exam   Tenderness Location: lateral joint line and medial joint line    Patellofemoral Crepitus: positive  Effusion: positive  Sensation: normal    Hip Exam   Tenderness Location: posterior and lateral  Sensation: normal    Elbow/Wrist Exam   Sensation: normal      Back/Neck Exam   Tenderness Right paramedian tenderness of the Upper C-Spine, Upper T-Spine, Upper L-Spine and SI Joint.Left paramedian tenderness of the Upper C-Spine, Upper T-Spine, SI Joint and Upper L-Spine.    GFR Non  >59 mL/min >60    eGFR  >59 mL/min >60    Comment:              STAGES OF CHRONIC KIDNEY DISEASE   STAGE          DESCRIPTION           GFR(mL/min/1.73 m2)   3         Moderate decrease GFR            30-59   4           Severe decrease GFR            15-29   5               Kidney Failure         <15 (or dialysis)   Chronic kidney disease is defined as either kidney damage   or GFR <60mL/min/1.73 m2 for >=3 months. Kidney damage is   defined as pathologic abnormalities or markers of damage,   including abnormalities in blood or urine tests or imaging   studies.   GFR is not calculated for patients under the age of 18.     Specimen Collected: 05/05/23 10:38    Performed by: [facility] Last Resulted: 05/05/23 14:13   Received From: [facility]  Result Received: 08/09/23 09:38    View Encounter      Received Information  HEMOGLOBIN A1C  Order: [id number]  Collected 5/5/2023 10:38          Component Ref Range & Units 3 mo ago   Hemoglobin A1C 4.6 - 6.2 % 5.4              Component Ref Range & Units 3 mo ago   Glucose 65 - 99 mg/dL 69    Sodium 136 - 144 mmol/L 141    Potassium 3.6 - 5.1 mmol/L 4.6    Chloride 101 - 111 mmol/L 108    CO2 22 - 32 mmol/L 24    Blood Urea Nitrogen 8 - 20 mg/dL 17    Calcium 8.9 - 10.3 mg/dL 9.8    Creatinine 0.60 - 1.10 mg/dL 0.73    Albumin 3.5 - 4.8 g/dL 4.2    Total Bilirubin 0.4 - 2.0 mg/dL 0.2 Low     Alkaline Phosphatase 28 - 116 U/L 122 High     Total Protein 6.1 - 7.9 g/dL 7.0    ALT 5 - 41 U/L 15    AST 10 - 34 U/L 18    Anion Gap 7 - 16 mmol/L 9         View Full Report        Specimen Collected: 05/05/23 10:38    Performed By: "[employer] Last Resulted: 05/05/23 14:13   Received From: [facility]  Result Received: 08/09/23 09:38       Received Information                LIPID PANEL  Order: [id number]  Collected 5/5/2023 10:38          Component Ref Range & Units 3 mo ago   Cholesterol 0 - 199 mg/dL 114    Triglycerides 0 - 199 mg/dL 58    HDL 29 - 89 mg/dL 46    LDL Calculated 0 - 109 mg/dL 56    Hdl/Cholesterol Ratio 0.0 - 4.5 2.5         Details    Reading Physician Reading Date Result Priority   Ismael Wild MD  [phone] 9/22/2022 Routine     Narrative & Impression  EXAMINATION:  CT CHEST WITHOUT CONTRAST 09/22/2022 at  11:24     INDICATION:  Chronic cough and dyspnea.  No history of recent trauma or surgery is provided.     COMPARISON:  Chest radiograph report of 12/20/2021.  CT chest report of 08/27/2020.     TECHNIQUE:  Axial non contrast-enhanced images are obtained of the chest with coronal and sagittal 3D reconstruction views. Automatic dose reduction is utilized. DLP is 111 mGy cm.     FINDINGS  The central airways are patent.  No displaced fracture or dislocation is appreciated. No significant effusions are appreciated. No pneumothorax, lobar consolidation or cardiac decompensation is appreciated. There are subcentimeter predominant similar mediastinal, hilar lymph nodes present.  Small hiatal hernia with slight gastroesophageal fold thickening is appreciated.  There are some minimal secretions as well as esophageal contour averaging about the left mainstem bronchus similar overall.  There is some minimal fissure thickening.  There is some patchy ground-glass attenuation with centrilobular distribution present for example anterior right upper lobe image 15 as well as right middle lobe image 28 laterally.  There is some chronic bandlike scarring at the inferior aspect of the right middle lobe and lingula.  There is slight fissure thickening.  There is similar ground-glass centrilobular changes for example anterior left upper lobe image 21.  There is some improved aeration overall of the left lower lobe as compared to the previous study with some residual ground-glass attenuation for example laterally with some bronchiolectasis.  Esophageal air can be seen with reflux, dysmotility.  There is some trace gallbladder sludge.  No significant interval pericardial fluid collection is appreciated.  There is some trace pericardial, recess fluid averaging similar overall including at the subcarinal, left tracheobronchial margin.  There is similar otherwise interval non-contrast appearance of the included upper abdominal organs. No  other significant interval changes are appreciated.     IMPRESSION  1. There is improved aeration with resolved consolidation of the left lower lobe.  No interval lobar consolidation or significant effusion is appreciated  2. There is some minimal patchy ground-glass centrilobular opacification bilaterally and could be seen with small airways disease related sequela as well as sequela of previous inflammation or granulomatous related change.  3. No interval abnormal mediastinal or hilar lymph node enlargement is appreciated.        Electronically signed by: Ismael Wild MD  Date:                                            09/22/2022  Time:                                           12:05           Exam Ended: 09/22/22 11:22 Last Resulted: 09/22/22 12:05              Assessment:     1. Psoriatic arthritis    2. Psoriasis with pustules    3. Anxiety and depression    4. Raynaud's disease without gangrene    5. COPD (chronic obstructive pulmonary disease) with chronic bronchitis          Plan:     Barbara was seen today for disease management.    Diagnoses and all orders for this visit:    Psoriatic arthritis  -     HYDROcodone-acetaminophen (NORCO)  mg per tablet; Take 1 tablet by mouth every 8 (eight) hours as needed for Pain.  -     HYDROcodone-acetaminophen (NORCO)  mg per tablet; Take 1 tablet by mouth every 8 (eight) hours as needed for Pain.  -     cyproheptadine (PERIACTIN) 4 mg tablet; Take 1 tablet (4 mg total) by mouth every evening.  -     zaleplon (SONATA) 10 MG capsule; Take 1 capsule (10 mg total) by mouth every evening.  -     butalbital-acetaminophen-caffeine -40 mg (FIORICET, ESGIC) -40 mg per tablet; TAKE ONE TABLET BY MOUTH EVERY 6 HOURS AS NEEDED FOR PAIN  -     fluticasone furoate-vilanteroL (BREO ELLIPTA) 200-25 mcg/dose DsDv diskus inhaler; Inhale 1 puff into the lungs once daily. Controller  -     dexAMETHasone injection 8 mg  -     ketorolac injection 60 mg  -      sulfamethoxazole-trimethoprim 800-160mg (BACTRIM DS) 800-160 mg Tab; Take 1 tablet by mouth once daily.    Psoriasis with pustules  -     HYDROcodone-acetaminophen (NORCO)  mg per tablet; Take 1 tablet by mouth every 8 (eight) hours as needed for Pain.  -     HYDROcodone-acetaminophen (NORCO)  mg per tablet; Take 1 tablet by mouth every 8 (eight) hours as needed for Pain.  -     HYDROcodone-acetaminophen (NORCO)  mg per tablet; Take 1 tablet by mouth every 8 (eight) hours as needed for Pain.  -     cyproheptadine (PERIACTIN) 4 mg tablet; Take 1 tablet (4 mg total) by mouth every evening.  -     zaleplon (SONATA) 10 MG capsule; Take 1 capsule (10 mg total) by mouth every evening.  -     butalbital-acetaminophen-caffeine -40 mg (FIORICET, ESGIC) -40 mg per tablet; TAKE ONE TABLET BY MOUTH EVERY 6 HOURS AS NEEDED FOR PAIN  -     fluticasone furoate-vilanteroL (BREO ELLIPTA) 200-25 mcg/dose DsDv diskus inhaler; Inhale 1 puff into the lungs once daily. Controller  -     dexAMETHasone injection 8 mg  -     ketorolac injection 60 mg  -     sulfamethoxazole-trimethoprim 800-160mg (BACTRIM DS) 800-160 mg Tab; Take 1 tablet by mouth once daily.    Anxiety and depression  -     HYDROcodone-acetaminophen (NORCO)  mg per tablet; Take 1 tablet by mouth every 8 (eight) hours as needed for Pain.  -     cyproheptadine (PERIACTIN) 4 mg tablet; Take 1 tablet (4 mg total) by mouth every evening.  -     zaleplon (SONATA) 10 MG capsule; Take 1 capsule (10 mg total) by mouth every evening.  -     butalbital-acetaminophen-caffeine -40 mg (FIORICET, ESGIC) -40 mg per tablet; TAKE ONE TABLET BY MOUTH EVERY 6 HOURS AS NEEDED FOR PAIN  -     fluticasone furoate-vilanteroL (BREO ELLIPTA) 200-25 mcg/dose DsDv diskus inhaler; Inhale 1 puff into the lungs once daily. Controller  -     dexAMETHasone injection 8 mg  -     ketorolac injection 60 mg  -     sulfamethoxazole-trimethoprim 800-160mg (BACTRIM  DS) 800-160 mg Tab; Take 1 tablet by mouth once daily.    Raynaud's disease without gangrene  -     HYDROcodone-acetaminophen (NORCO)  mg per tablet; Take 1 tablet by mouth every 8 (eight) hours as needed for Pain.  -     dexAMETHasone injection 8 mg  -     ketorolac injection 60 mg  -     sulfamethoxazole-trimethoprim 800-160mg (BACTRIM DS) 800-160 mg Tab; Take 1 tablet by mouth once daily.    COPD (chronic obstructive pulmonary disease) with chronic bronchitis  -     HYDROcodone-acetaminophen (NORCO)  mg per tablet; Take 1 tablet by mouth every 8 (eight) hours as needed for Pain.  -     cyproheptadine (PERIACTIN) 4 mg tablet; Take 1 tablet (4 mg total) by mouth every evening.  -     zaleplon (SONATA) 10 MG capsule; Take 1 capsule (10 mg total) by mouth every evening.  -     butalbital-acetaminophen-caffeine -40 mg (FIORICET, ESGIC) -40 mg per tablet; TAKE ONE TABLET BY MOUTH EVERY 6 HOURS AS NEEDED FOR PAIN  -     fluticasone furoate-vilanteroL (BREO ELLIPTA) 200-25 mcg/dose DsDv diskus inhaler; Inhale 1 puff into the lungs once daily. Controller  -     dexAMETHasone injection 8 mg  -     ketorolac injection 60 mg  -     sulfamethoxazole-trimethoprim 800-160mg (BACTRIM DS) 800-160 mg Tab; Take 1 tablet by mouth once daily.    Chronic pain syndrome  -     HYDROcodone-acetaminophen (NORCO)  mg per tablet; Take 1 tablet by mouth every 8 (eight) hours as needed for Pain.  -     HYDROcodone-acetaminophen (NORCO)  mg per tablet; Take 1 tablet by mouth every 8 (eight) hours as needed for Pain.  -     HYDROcodone-acetaminophen (NORCO)  mg per tablet; Take 1 tablet by mouth every 8 (eight) hours as needed for Pain.  -     butalbital-acetaminophen-caffeine -40 mg (FIORICET, ESGIC) -40 mg per tablet; TAKE ONE TABLET BY MOUTH EVERY 6 HOURS AS NEEDED FOR PAIN  -     dexAMETHasone injection 8 mg  -     ketorolac injection 60 mg  -     sulfamethoxazole-trimethoprim 800-160mg  (BACTRIM DS) 800-160 mg Tab; Take 1 tablet by mouth once daily.    Chronic cough  -     HYDROcodone-acetaminophen (NORCO)  mg per tablet; Take 1 tablet by mouth every 8 (eight) hours as needed for Pain.  -     cyproheptadine (PERIACTIN) 4 mg tablet; Take 1 tablet (4 mg total) by mouth every evening.  -     zaleplon (SONATA) 10 MG capsule; Take 1 capsule (10 mg total) by mouth every evening.  -     butalbital-acetaminophen-caffeine -40 mg (FIORICET, ESGIC) -40 mg per tablet; TAKE ONE TABLET BY MOUTH EVERY 6 HOURS AS NEEDED FOR PAIN  -     fluticasone furoate-vilanteroL (BREO ELLIPTA) 200-25 mcg/dose DsDv diskus inhaler; Inhale 1 puff into the lungs once daily. Controller  -     dexAMETHasone injection 8 mg  -     ketorolac injection 60 mg  -     sulfamethoxazole-trimethoprim 800-160mg (BACTRIM DS) 800-160 mg Tab; Take 1 tablet by mouth once daily.    Primary insomnia  -     zaleplon (SONATA) 10 MG capsule; Take 1 capsule (10 mg total) by mouth every evening.  -     dexAMETHasone injection 8 mg  -     ketorolac injection 60 mg  -     sulfamethoxazole-trimethoprim 800-160mg (BACTRIM DS) 800-160 mg Tab; Take 1 tablet by mouth once daily.    Nonintractable headache, unspecified chronicity pattern, unspecified headache type  -     cyproheptadine (PERIACTIN) 4 mg tablet; Take 1 tablet (4 mg total) by mouth every evening.  -     zaleplon (SONATA) 10 MG capsule; Take 1 capsule (10 mg total) by mouth every evening.  -     butalbital-acetaminophen-caffeine -40 mg (FIORICET, ESGIC) -40 mg per tablet; TAKE ONE TABLET BY MOUTH EVERY 6 HOURS AS NEEDED FOR PAIN  -     fluticasone furoate-vilanteroL (BREO ELLIPTA) 200-25 mcg/dose DsDv diskus inhaler; Inhale 1 puff into the lungs once daily. Controller  -     dexAMETHasone injection 8 mg  -     ketorolac injection 60 mg  -     sulfamethoxazole-trimethoprim 800-160mg (BACTRIM DS) 800-160 mg Tab; Take 1 tablet by mouth once daily.    Tobacco dependence  -      varenicline (CHANTIX STARTING MONTH BOX) 0.5 mg (11)- 1 mg (42) tablet; Take one 0.5mg tab by mouth once daily X3 days,then increase to one 0.5mg tab twice daily X4 days,then increase to one 1mg tab twice daily  -     varenicline (CHANTIX) 1 mg Tab; Take 1 tablet (1 mg total) by mouth 2 (two) times daily.      1.norco refill . I have  check [place] prescription monitoring program site and no unusual or abnormal behavior has occurred  2.nurse injection  3.More than 50% of the  40 minute encounter was spent face to face counseling the patient regarding current status and future plan of care as well as side effects  of the medications. All questions were answered to patient's satisfaction also includes  non-face to face time preparing to see the patient (eg, review of tests), Obtaining and/or reviewing separately obtained history, Documenting clinical information in the electronic or other health record, Independently interpreting results

## 2023-08-13 RX ORDER — LISINOPRIL 20 MG/1
TABLET ORAL
Qty: 330 TABLET | Refills: 3 | Status: SHIPPED | OUTPATIENT
Start: 2023-08-13 | End: 2024-03-04

## 2023-09-08 DIAGNOSIS — F41.9 ANXIETY AND DEPRESSION: ICD-10-CM

## 2023-09-08 DIAGNOSIS — F32.A ANXIETY AND DEPRESSION: ICD-10-CM

## 2023-09-08 DIAGNOSIS — L40.50 PSORIATIC ARTHRITIS: ICD-10-CM

## 2023-09-08 DIAGNOSIS — M06.00 SERONEGATIVE RHEUMATOID ARTHRITIS: ICD-10-CM

## 2023-09-08 DIAGNOSIS — G47.00 INSOMNIA, UNSPECIFIED TYPE: ICD-10-CM

## 2023-09-08 RX ORDER — IMIPRAMINE HYDROCHLORIDE 50 MG/1
TABLET, FILM COATED ORAL
Qty: 30 TABLET | Refills: 11 | OUTPATIENT
Start: 2023-09-08

## 2023-09-08 RX ORDER — QUETIAPINE FUMARATE 50 MG/1
150 TABLET, FILM COATED ORAL NIGHTLY
Qty: 270 TABLET | Refills: 1 | Status: SHIPPED | OUTPATIENT
Start: 2023-09-08

## 2023-09-08 RX ORDER — PAROXETINE HYDROCHLORIDE 40 MG/1
40 TABLET, FILM COATED ORAL DAILY
Qty: 90 TABLET | Refills: 1 | Status: SHIPPED | OUTPATIENT
Start: 2023-09-08

## 2023-09-08 RX ORDER — GABAPENTIN 300 MG/1
600 CAPSULE ORAL 2 TIMES DAILY
Qty: 360 CAPSULE | Refills: 1 | Status: SHIPPED | OUTPATIENT
Start: 2023-09-08 | End: 2024-09-02

## 2023-10-11 DIAGNOSIS — J30.2 SEASONAL ALLERGIES: ICD-10-CM

## 2023-10-11 NOTE — TELEPHONE ENCOUNTER
Pharmacy requesting refill on Levocetirizine 5mg   Pt's LOV 08/09/2023  Pt's NOV 12/08/2023  Medication pending

## 2023-10-12 RX ORDER — LEVOCETIRIZINE DIHYDROCHLORIDE 5 MG/1
5 TABLET, FILM COATED ORAL NIGHTLY
Qty: 30 TABLET | Refills: 5 | Status: SHIPPED | OUTPATIENT
Start: 2023-10-12 | End: 2024-10-11

## 2023-11-06 DIAGNOSIS — F32.A ANXIETY AND DEPRESSION: ICD-10-CM

## 2023-11-06 DIAGNOSIS — G89.4 CHRONIC PAIN SYNDROME: Primary | ICD-10-CM

## 2023-11-06 DIAGNOSIS — L40.8 PSORIASIS WITH PUSTULES: ICD-10-CM

## 2023-11-06 DIAGNOSIS — F41.9 ANXIETY AND DEPRESSION: ICD-10-CM

## 2023-11-06 DIAGNOSIS — L40.50 PSORIATIC ARTHRITIS: ICD-10-CM

## 2023-11-07 RX ORDER — HYDROCODONE BITARTRATE AND ACETAMINOPHEN 10; 325 MG/1; MG/1
1 TABLET ORAL EVERY 8 HOURS PRN
Qty: 90 TABLET | Refills: 0 | Status: SHIPPED | OUTPATIENT
Start: 2023-11-07 | End: 2023-12-04

## 2023-11-24 DIAGNOSIS — F32.A ANXIETY AND DEPRESSION: ICD-10-CM

## 2023-11-24 DIAGNOSIS — J44.89 COPD (CHRONIC OBSTRUCTIVE PULMONARY DISEASE) WITH CHRONIC BRONCHITIS: ICD-10-CM

## 2023-11-24 DIAGNOSIS — L40.50 PSORIATIC ARTHRITIS: ICD-10-CM

## 2023-11-24 DIAGNOSIS — L40.8 PSORIASIS WITH PUSTULES: ICD-10-CM

## 2023-11-24 DIAGNOSIS — G89.4 CHRONIC PAIN SYNDROME: ICD-10-CM

## 2023-11-24 DIAGNOSIS — F41.9 ANXIETY AND DEPRESSION: ICD-10-CM

## 2023-11-24 DIAGNOSIS — F41.9 ANXIETY: ICD-10-CM

## 2023-11-24 DIAGNOSIS — R51.9 NONINTRACTABLE HEADACHE, UNSPECIFIED CHRONICITY PATTERN, UNSPECIFIED HEADACHE TYPE: ICD-10-CM

## 2023-11-24 DIAGNOSIS — R05.3 CHRONIC COUGH: ICD-10-CM

## 2023-11-24 RX ORDER — BUSPIRONE HYDROCHLORIDE 10 MG/1
10 TABLET ORAL 3 TIMES DAILY
Qty: 90 TABLET | Refills: 3 | Status: SHIPPED | OUTPATIENT
Start: 2023-11-24 | End: 2024-03-04

## 2023-11-24 RX ORDER — CYPROHEPTADINE HYDROCHLORIDE 4 MG/1
4 TABLET ORAL NIGHTLY
Qty: 30 TABLET | Refills: 3 | Status: SHIPPED | OUTPATIENT
Start: 2023-11-24 | End: 2024-04-03

## 2023-11-24 RX ORDER — BUTALBITAL, ACETAMINOPHEN AND CAFFEINE 50; 325; 40 MG/1; MG/1; MG/1
TABLET ORAL
Qty: 120 TABLET | Refills: 3 | Status: SHIPPED | OUTPATIENT
Start: 2023-11-24

## 2023-12-01 DIAGNOSIS — E78.5 HYPERLIPIDEMIA, UNSPECIFIED HYPERLIPIDEMIA TYPE: ICD-10-CM

## 2023-12-01 RX ORDER — ATORVASTATIN CALCIUM 20 MG/1
20 TABLET, FILM COATED ORAL
Qty: 90 TABLET | Refills: 3 | OUTPATIENT
Start: 2023-12-01

## 2023-12-04 DIAGNOSIS — L40.8 PSORIASIS WITH PUSTULES: ICD-10-CM

## 2023-12-04 DIAGNOSIS — L40.50 PSORIATIC ARTHRITIS: ICD-10-CM

## 2023-12-04 DIAGNOSIS — G89.4 CHRONIC PAIN SYNDROME: ICD-10-CM

## 2023-12-04 RX ORDER — HYDROCODONE BITARTRATE AND ACETAMINOPHEN 10; 325 MG/1; MG/1
1 TABLET ORAL EVERY 8 HOURS PRN
Qty: 90 TABLET | Refills: 0 | Status: SHIPPED | OUTPATIENT
Start: 2023-12-04 | End: 2023-12-15 | Stop reason: SDUPTHER

## 2023-12-05 ENCOUNTER — HOSPITAL ENCOUNTER (OUTPATIENT)
Dept: RADIOLOGY | Facility: HOSPITAL | Age: 63
Discharge: HOME | End: 2023-12-05
Attending: INTERNAL MEDICINE
Payer: MEDICARE

## 2023-12-05 DIAGNOSIS — R93.89 ABNORMAL CT OF THE CHEST: ICD-10-CM

## 2023-12-05 DIAGNOSIS — R73.9 HYPERGLYCEMIA: ICD-10-CM

## 2023-12-05 DIAGNOSIS — Z79.52 CURRENT CHRONIC USE OF SYSTEMIC STEROIDS: ICD-10-CM

## 2023-12-05 DIAGNOSIS — R05.3 CHRONIC COUGH: ICD-10-CM

## 2023-12-05 DIAGNOSIS — L40.50 PSORIATIC ARTHRITIS: ICD-10-CM

## 2023-12-05 PROCEDURE — 77080 DEXA BONE DENSITY SPINE HIP: ICD-10-PCS | Mod: 26,,, | Performed by: RADIOLOGY

## 2023-12-05 PROCEDURE — 77080 DXA BONE DENSITY AXIAL: CPT | Mod: 26,,, | Performed by: RADIOLOGY

## 2023-12-05 PROCEDURE — 77080 DXA BONE DENSITY AXIAL: CPT | Mod: TC,PO

## 2023-12-09 DIAGNOSIS — E78.5 HYPERLIPIDEMIA, UNSPECIFIED HYPERLIPIDEMIA TYPE: ICD-10-CM

## 2023-12-11 RX ORDER — ATORVASTATIN CALCIUM 20 MG/1
20 TABLET, FILM COATED ORAL
Qty: 90 TABLET | Refills: 3 | OUTPATIENT
Start: 2023-12-11

## 2023-12-14 ENCOUNTER — OFFICE VISIT (OUTPATIENT)
Dept: RHEUMATOLOGY | Facility: CLINIC | Age: 63
End: 2023-12-14
Payer: MEDICARE

## 2023-12-14 VITALS
BODY MASS INDEX: 21.2 KG/M2 | SYSTOLIC BLOOD PRESSURE: 162 MMHG | WEIGHT: 108 LBS | HEIGHT: 60 IN | DIASTOLIC BLOOD PRESSURE: 99 MMHG | HEART RATE: 92 BPM

## 2023-12-14 DIAGNOSIS — L02.91 ABSCESS: ICD-10-CM

## 2023-12-14 DIAGNOSIS — R93.89 ABNORMAL CT OF THE CHEST: ICD-10-CM

## 2023-12-14 DIAGNOSIS — E55.9 VITAMIN D DEFICIENCY: ICD-10-CM

## 2023-12-14 DIAGNOSIS — G89.4 CHRONIC PAIN SYNDROME: ICD-10-CM

## 2023-12-14 DIAGNOSIS — I25.10 CORONARY ARTERY CALCIFICATION: ICD-10-CM

## 2023-12-14 DIAGNOSIS — M06.00 SERONEGATIVE RHEUMATOID ARTHRITIS: ICD-10-CM

## 2023-12-14 DIAGNOSIS — M81.0 OSTEOPOROSIS, UNSPECIFIED OSTEOPOROSIS TYPE, UNSPECIFIED PATHOLOGICAL FRACTURE PRESENCE: ICD-10-CM

## 2023-12-14 DIAGNOSIS — I10 HYPERTENSION, UNSPECIFIED TYPE: ICD-10-CM

## 2023-12-14 DIAGNOSIS — M79.7 FIBROMYALGIA: ICD-10-CM

## 2023-12-14 DIAGNOSIS — I25.84 CORONARY ARTERY CALCIFICATION: ICD-10-CM

## 2023-12-14 DIAGNOSIS — E03.9 HYPOTHYROIDISM, UNSPECIFIED TYPE: ICD-10-CM

## 2023-12-14 DIAGNOSIS — L40.50 PSORIATIC ARTHRITIS: Primary | ICD-10-CM

## 2023-12-14 PROCEDURE — 96372 THER/PROPH/DIAG INJ SC/IM: CPT | Mod: PBBFAC,PN

## 2023-12-14 PROCEDURE — 99999 PR PBB SHADOW E&M-EST. PATIENT-LVL V: ICD-10-PCS | Mod: PBBFAC,,, | Performed by: PHYSICIAN ASSISTANT

## 2023-12-14 PROCEDURE — 99215 OFFICE O/P EST HI 40 MIN: CPT | Mod: 25,S$PBB,, | Performed by: PHYSICIAN ASSISTANT

## 2023-12-14 PROCEDURE — 99215 OFFICE O/P EST HI 40 MIN: CPT | Mod: 25,PBBFAC,PN | Performed by: PHYSICIAN ASSISTANT

## 2023-12-14 PROCEDURE — 99999 PR PBB SHADOW E&M-EST. PATIENT-LVL V: CPT | Mod: PBBFAC,,, | Performed by: PHYSICIAN ASSISTANT

## 2023-12-14 PROCEDURE — 99999PBSHW PR PBB SHADOW TECHNICAL ONLY FILED TO HB: ICD-10-PCS | Mod: PBBFAC,,,

## 2023-12-14 PROCEDURE — 99215 PR OFFICE/OUTPT VISIT, EST, LEVL V, 40-54 MIN: ICD-10-PCS | Mod: 25,S$PBB,, | Performed by: PHYSICIAN ASSISTANT

## 2023-12-14 PROCEDURE — 99999PBSHW PR PBB SHADOW TECHNICAL ONLY FILED TO HB: Mod: PBBFAC,,,

## 2023-12-14 RX ORDER — CYCLOBENZAPRINE HCL 10 MG
TABLET ORAL
Qty: 270 TABLET | Refills: 1 | Status: SHIPPED | OUTPATIENT
Start: 2023-12-14

## 2023-12-14 RX ORDER — DOXYCYCLINE 100 MG/1
100 CAPSULE ORAL EVERY 12 HOURS
Qty: 20 CAPSULE | Refills: 0 | Status: SHIPPED | OUTPATIENT
Start: 2023-12-14 | End: 2024-03-04

## 2023-12-14 RX ORDER — KETOROLAC TROMETHAMINE 30 MG/ML
60 INJECTION, SOLUTION INTRAMUSCULAR; INTRAVENOUS
Status: COMPLETED | OUTPATIENT
Start: 2023-12-14 | End: 2023-12-14

## 2023-12-14 RX ORDER — ALENDRONATE SODIUM 70 MG/1
70 TABLET ORAL
Qty: 4 TABLET | Refills: 11 | Status: SHIPPED | OUTPATIENT
Start: 2023-12-14

## 2023-12-14 RX ORDER — LEVOTHYROXINE SODIUM 50 UG/1
50 TABLET ORAL
Qty: 90 TABLET | Refills: 1 | Status: SHIPPED | OUTPATIENT
Start: 2023-12-14 | End: 2024-12-13

## 2023-12-14 RX ORDER — AMLODIPINE BESYLATE 5 MG/1
5 TABLET ORAL DAILY
Qty: 90 TABLET | Refills: 3 | Status: SHIPPED | OUTPATIENT
Start: 2023-12-14

## 2023-12-14 RX ORDER — BUPROPION HYDROCHLORIDE 150 MG/1
TABLET, EXTENDED RELEASE ORAL
COMMUNITY
Start: 2023-08-17 | End: 2024-03-04

## 2023-12-14 RX ORDER — PREGABALIN 50 MG/1
50 CAPSULE ORAL 3 TIMES DAILY
COMMUNITY
End: 2023-12-14

## 2023-12-14 RX ORDER — DEXAMETHASONE SODIUM PHOSPHATE 4 MG/ML
8 INJECTION, SOLUTION INTRA-ARTICULAR; INTRALESIONAL; INTRAMUSCULAR; INTRAVENOUS; SOFT TISSUE
Status: COMPLETED | OUTPATIENT
Start: 2023-12-14 | End: 2023-12-14

## 2023-12-14 RX ADMIN — DEXAMETHASONE SODIUM PHOSPHATE 8 MG: 4 INJECTION, SOLUTION INTRA-ARTICULAR; INTRALESIONAL; INTRAMUSCULAR; INTRAVENOUS; SOFT TISSUE at 12:12

## 2023-12-14 RX ADMIN — KETOROLAC TROMETHAMINE 60 MG: 60 INJECTION, SOLUTION INTRAMUSCULAR at 01:12

## 2023-12-14 NOTE — PATIENT INSTRUCTIONS
[facility]  Office Hours:  7:30 a.m. to 4:30 p.m.  Monday through Thursday  7:30 a.m. to 2:00 p.m.  Friday  [id number].  [address]  [place] [address] [phone]

## 2023-12-14 NOTE — Clinical Note
Please reach out to follow up on uncontrolled HTN.Not currently followed by primary care. I referred to cardiology at her last visit

## 2023-12-15 DIAGNOSIS — G89.4 CHRONIC PAIN SYNDROME: ICD-10-CM

## 2023-12-15 DIAGNOSIS — L40.8 PSORIASIS WITH PUSTULES: ICD-10-CM

## 2023-12-15 DIAGNOSIS — L40.50 PSORIATIC ARTHRITIS: ICD-10-CM

## 2023-12-15 NOTE — PROGRESS NOTES
Subjective:       Patient ID: Barbara Mims is a 63 y.o. female.    Chief Complaint: Disease Management    Mrs. Mims is a 63 year old female who presents to clinic for follow up on psoriatic arthritis, seronegative RA, and pain management. She is doing poorly--reports increased pain and swelling in her PIP and DIP joints. She has destructive changes in her fingernails. She reports worsening psoriasis. She complains of increased pain in her feet (MTPs) and bottom of her first when she first puts weight on her feet in the morning. She has been compliant with plaquenil and prednisone 5 mg PRN for flares. She is ready to start Rinvoq.    She has chronic neck pain s/p cervical surgery and is no longer followed by pain management after multiple unsuccessful interventional treatments. She is on norco and fioricet PRN for severe pain. She is taking gabapentin TID and occasionally adds lyrica 50 mg for severe pain.    We reviewed her recent DEXA consistent with osteoporosis. CT chest shows filling defect in the bronchus consistent with atelectasis. Pulmonary referral recommended for bronchoscopy. Coronary calcification also seen.     She also complains of multiple abscess in the L axilla.     Current treatment:  1. Norco 10/325 TID PRN for pain  2. OTC ibuprofen 400 mg daily  3. Prednisone 5 mg daily  4. plaquenil    Review of Systems   Constitutional: Positive for activity change. Negative for appetite change, chills, fatigue and fever.   Eyes: Negative for visual disturbance.   Respiratory: Positive for shortness of breath (chronic, stable)  Cardiovascular: Negative for chest pain, palpitations and leg swelling.   Gastrointestinal: Negative for abdominal pain.   GYN: Positive for dysuria  Musculoskeletal: Positive for arthralgias, joint swelling, neck pain and neck stiffness.   Neurological: Negative for dizziness, weakness, light-headedness and headaches.   Psychiatric/Behavioral: Negative for dysphoric mood. The  patient is not nervous/anxious.          Objective:     Vitals:    12/14/23 1146   BP: (!) 162/99   Pulse: 92         Past Medical History:   Diagnosis Date    Alopecia     Anemia     Anxiety     Arthritis     Cardiac angina syndrome     Chronic kidney disease     kidney stones    Degenerative disc disease     Depression     Dry eyes     Dry mouth     Hyperlipidemia     Hypertension     Kidney stones     Mitral valve prolapse     Thyroid disease      Past Surgical History:   Procedure Laterality Date    CARDIAC CATHETERIZATION      CERVICAL DISC SURGERY      HYSTERECTOMY      LITHOTRIPSY            Physical Exam   Constitutional: She is well-developed, well-nourished, and in no distress.   Eyes: Right conjunctiva is not injected. Left conjunctiva is not injected.   Neck: No JVD present. Muscular tenderness present. No spinous process tenderness present. Decreased range of motion present. No thyromegaly present.   Cardiovascular: Normal rate and regular rhythm.  Exam reveals no decreased pulses.    Pulmonary/Chest: normal effort      Right Side Rheumatological Exam     Examination finds the shoulder, elbow, wrist, knee, 4th MCP and 5th MCP normal.    The patient is tender to palpation of the 1st PIP, 1st MCP, 2nd PIP, 2nd MCP, 3rd PIP, 3rd MCP, 4th PIP and 5th PIP    The patient has an enlarged and swollen 1st PIP, 2nd PIP, 3rd PIP, 4th PIP, and 5th PIP, 1-5th DIPs    Left Side Rheumatological Exam     Examination finds the shoulder, elbow, wrist, knee, 1st MCP, 2nd MCP, 3rd MCP, 4th MCP and 5th MCP normal.    The patient is tender to palpation of the 1st PIP, 2nd PIP, 3rd PIP, 4th PIP and 5th PIP.    The patient has an enlarged and swollen 1st PIP, 2nd PIP, 3rd PIP, 4th PIP, and 5th PIP, 1-5th DIPs    Lymphadenopathy:     She has no cervical adenopathy.   Neurological: Gait normal.   Skin:   +psoriatic nail changes   Psychiatric: Mood and affect normal.       Labs:  Component      Latest Ref Rng 12/5/2023   WBC       3.90 - 12.70 K/uL 9.90    RBC      4.00 - 5.40 M/uL 4.00    Hemoglobin      12.0 - 16.0 g/dL 13.2    Hematocrit      37.0 - 48.5 % 40.8    MCV      82 - 98 fL 102 (H)    MCH      27.0 - 31.0 pg 33.0 (H)    MCHC      32.0 - 36.0 g/dL 32.4    RDW      11.5 - 14.5 % 12.3    Platelet Count      150 - 450 K/uL 386    MPV      9.2 - 12.9 fL 9.6    Immature Granulocytes      0.0 - 0.5 % 0.3    Gran # (ANC)      1.8 - 7.7 K/uL 5.7    Immature Grans (Abs)      0.00 - 0.04 K/uL 0.03    Lymph #      1.0 - 4.8 K/uL 3.0    Mono #      0.3 - 1.0 K/uL 0.7    Eos #      0.0 - 0.5 K/uL 0.4    Baso #      0.00 - 0.20 K/uL 0.13    nRBC      0 /100 WBC 0    Gran %      38.0 - 73.0 % 57.2    Lymph %      18.0 - 48.0 % 30.7    Mono %      4.0 - 15.0 % 6.6    Eosinophil %      0.0 - 8.0 % 3.9    Basophil %      0.0 - 1.9 % 1.3    Differential Method Automated    Sodium      136 - 145 mmol/L 140    Potassium      3.5 - 5.1 mmol/L 4.4    Chloride      95 - 110 mmol/L 105    CO2      23 - 29 mmol/L 23    Glucose      70 - 110 mg/dL 109    BUN      8 - 23 mg/dL 15    Creatinine      0.5 - 1.4 mg/dL 0.7    Calcium      8.7 - 10.5 mg/dL 9.8    PROTEIN TOTAL      6.0 - 8.4 g/dL 7.8    Albumin      3.5 - 5.2 g/dL 4.1    BILIRUBIN TOTAL      0.1 - 1.0 mg/dL 0.1    ALP      55 - 135 U/L 142 (H)    AST      10 - 40 U/L 21    ALT      10 - 44 U/L 18    eGFR      >60 mL/min/1.73 m^2 >60.0    Anion Gap      8 - 16 mmol/L 12    Protein, Serum      6.0 - 8.4 g/dL 7.3    Albumin grams/dl      3.35 - 5.55 g/dL 4.42    Alpha-1 grams/dl      0.17 - 0.41 g/dL 0.31    Alpha-2      0.43 - 0.99 g/dL 0.92    Beta      0.50 - 1.10 g/dL 0.88    Gamma      0.67 - 1.58 g/dL 0.77    NIL      IU/mL 0.65065    TB1 - Nil      IU/mL 0.024    TB2 - Nil      IU/mL 0.006    Mitogen - Nil      IU/mL 6.072    TB Gold Plus      Negative  Negative    Hep B S Ab      mIU/mL <3.00    Hep B S Ab       Non-reactive    Sed Rate      0 - 20 mm/Hr 11    CRP      0.0 - 8.2 mg/L 4.6     TSH      0.400 - 4.000 uIU/mL 2.883    Free T4      0.71 - 1.51 ng/dL 0.73    T3, Total      60 - 180 ng/dL 93    Hep B C IgM      Non-reactive  Non-reactive    Hepatitis B Surface Ag      Non-reactive  Non-reactive    Hepatitis C Ab      Non-reactive  Non-reactive    Hep B Core Total Ab      Non-reactive  Non-reactive    Pathologist Interpretation SPE REVIEWED       Imaging:  Narrative & Impression  EXAMINATION:  DXA BONE DENSITY AXIAL SKELETON 1 OR MORE SITES     CLINICAL HISTORY:  Arthropathic psoriasis, unspecified     TECHNIQUE:  DXA scanning was performed over the left hip and lumbar spine.  Review of the images confirms satisfactory positioning and technique.     COMPARISON:  None     FINDINGS:  The L1 to L4 vertebral bone mineral density is equal to 0.696 g/cm squared with a T score of -3.2.     The left femoral neck bone mineral density is equal to 0.62 g/cm squared with a T score of -2.1.     Impression:     Osteoporosis       Narrative & Impression  EXAMINATION:  CT CHEST LUNG SCREENING LOW DOSE     CLINICAL HISTORY:  f17.210; Nicotine dependence, cigarettes, uncomplicated     TECHNIQUE:  Axial CT images were obtained through the chest without contrast.  Total DLP 24.  CTDIvol (mGy) 0.68.  Reconstructed image with 1 mm.  Automated exposure control utilized.     COMPARISON:  09/22/2022     FINDINGS:  There is 3 mm nodule within a subsegmental bronchus of the right upper lobe image 175 of series 6.  There is a 3 mm part solid right upper lobe pulmonary nodule image 198 of series 6.  There do appear to be filling defects likely secretions within the left mainstem bronchus and within the left lower lobe bronchi with left lower lobe consolidation most compatible with atelectasis.  A component of pneumonia would be difficult to exclude.  There are calcified granulomas noted.  There are mucous plugs noted within left upper lobe subsegmental bronchi.  There is no pneumothorax.  There is no pleural effusion.      Atherosclerotic calcifications are noted thoracic aorta and branches including coronary arteries.  Thoracic aorta is not aneurysmal.  There is no pericardial effusion.  No lymphadenopathy noted.  Included portions of the non contrasted upper abdomen demonstrate nonobstructing left renal calculus.  No acute abnormality identified.  No acute displaced fractures identified.     Impression:     Lung-RADS Category 2S-Benign Appearance or Behavior- continue annual screening with LDCT in 12 months.     Filling defects within left mainstem bronchus and more notably within left lower lobe bronchi likely reflect secretions/mucoid impaction with a left lower lobe consolidation compatible with atelectasis.  A component of pneumonia would be difficult to exclude.  Recommend pulmonary consultation for consideration of bronchoscopy.     Coronary artery calcifications are present. Consider dedicated calcium score CT if clinically indicated  Assessment:       1. Psoriatic arthritis    2. Seronegative rheumatoid arthritis    3. Hypothyroidism, unspecified type    4. Chronic pain syndrome    5. Vitamin D deficiency    6. Fibromyalgia    7. Hypertension, unspecified type    8. Coronary artery calcification    9. Osteoporosis, unspecified osteoporosis type, unspecified pathological fracture presence    10. Abscess    11. Abnormal CT of the chest              Plan:       Psoriatic arthritis  -     cyclobenzaprine (FLEXERIL) 10 MG tablet; TAKE ONE TABLET BY MOUTH THREE TIMES DAILY AS NEEDED FOR MUSCLE SPASMS  Dispense: 270 tablet; Refill: 1  -     ketorolac injection 60 mg  -     dexAMETHasone injection 8 mg  -     CBC Auto Differential; Future; Expected date: 12/14/2023  -     C-Reactive Protein; Future; Expected date: 12/14/2023  -     Comprehensive Metabolic Panel; Future; Expected date: 12/14/2023  -     Sedimentation rate; Future; Expected date: 12/14/2023    Seronegative rheumatoid arthritis  -     cyclobenzaprine (FLEXERIL) 10 MG  tablet; TAKE ONE TABLET BY MOUTH THREE TIMES DAILY AS NEEDED FOR MUSCLE SPASMS  Dispense: 270 tablet; Refill: 1  -     ketorolac injection 60 mg  -     dexAMETHasone injection 8 mg    Hypothyroidism, unspecified type  -     levothyroxine (SYNTHROID) 50 MCG tablet; Take 1 tablet (50 mcg total) by mouth before breakfast.  Dispense: 90 tablet; Refill: 1    Chronic pain syndrome  -     cyclobenzaprine (FLEXERIL) 10 MG tablet; TAKE ONE TABLET BY MOUTH THREE TIMES DAILY AS NEEDED FOR MUSCLE SPASMS  Dispense: 270 tablet; Refill: 1    Vitamin D deficiency  -     Vitamin D; Future; Expected date: 12/14/2023    Fibromyalgia    Hypertension, unspecified type  -     amLODIPine (NORVASC) 5 MG tablet; Take 1 tablet (5 mg total) by mouth once daily.  Dispense: 90 tablet; Refill: 3  -     Ambulatory referral/consult to Cardiology; Future; Expected date: 12/21/2023    Coronary artery calcification  -     Ambulatory referral/consult to Cardiology; Future; Expected date: 12/21/2023    Osteoporosis, unspecified osteoporosis type, unspecified pathological fracture presence  -     alendronate (FOSAMAX) 70 MG tablet; Take 1 tablet (70 mg total) by mouth every 7 days.  Dispense: 4 tablet; Refill: 11    Abscess  -     doxycycline (VIBRAMYCIN) 100 MG Cap; Take 1 capsule (100 mg total) by mouth every 12 (twelve) hours.  Dispense: 20 capsule; Refill: 0    Abnormal CT of the chest  -     Ambulatory referral/consult to Pulmonology; Future; Expected date: 12/22/2023          Assessment:  63 year old female with   Psoriatic arthritis, psoriasis, seronegative RA, fibromyalgia   --chronic neck pain s/p cervical surgery, failed pain management interventions  --chronic pain syndrome on norco  --chronic insomnia on seroquel  --fibromyalgia  --vitamin D deficiency  --macrocytosis   --osteoporosis    Plan:  1. Toradol 60, dexa 8 mg today for arthritis flare  2. Doxcycyline for abscess (? HS)  3. Monitor BP at home and keep a log. Notify clinic if BP is  persistently >140/90.    4. Wait to start Rinvoq until after Cardiology work up   5. Cont plaquenil 200 mg daily, prednisone 5-10 mg daily prn  6. Cont flexeril  7. Cont seroquel  8. Cont norco per MD.  I have checked [place] prescription monitoring program site and no unusual or abnormal behavior has occurred pt understand the risk and benefits of taking opioid medications and has decided to continue the medication.  9. Add fosamax once weekly  10. Pulmonary referral      Follow up:   3-4 mo Dr. Rodriguez

## 2023-12-17 RX ORDER — HYDROCODONE BITARTRATE AND ACETAMINOPHEN 10; 325 MG/1; MG/1
1 TABLET ORAL EVERY 8 HOURS PRN
Qty: 90 TABLET | Refills: 0 | Status: SHIPPED | OUTPATIENT
Start: 2024-01-05

## 2023-12-17 RX ORDER — HYDROCODONE BITARTRATE AND ACETAMINOPHEN 10; 325 MG/1; MG/1
1 TABLET ORAL EVERY 8 HOURS PRN
Qty: 90 TABLET | Refills: 0 | Status: SHIPPED | OUTPATIENT
Start: 2024-02-03

## 2023-12-17 RX ORDER — HYDROCODONE BITARTRATE AND ACETAMINOPHEN 10; 325 MG/1; MG/1
1 TABLET ORAL EVERY 8 HOURS PRN
Qty: 90 TABLET | Refills: 0 | Status: SHIPPED | OUTPATIENT
Start: 2024-03-04 | End: 2024-03-06 | Stop reason: SDUPTHER

## 2023-12-18 ENCOUNTER — TELEPHONE (OUTPATIENT)
Dept: RHEUMATOLOGY | Facility: CLINIC | Age: 63
End: 2023-12-18
Payer: MEDICARE

## 2023-12-18 DIAGNOSIS — F32.A ANXIETY AND DEPRESSION: ICD-10-CM

## 2023-12-18 DIAGNOSIS — L40.8 PSORIASIS WITH PUSTULES: ICD-10-CM

## 2023-12-18 DIAGNOSIS — F41.9 ANXIETY AND DEPRESSION: ICD-10-CM

## 2023-12-18 DIAGNOSIS — J44.89 COPD (CHRONIC OBSTRUCTIVE PULMONARY DISEASE) WITH CHRONIC BRONCHITIS: ICD-10-CM

## 2023-12-18 DIAGNOSIS — R51.9 NONINTRACTABLE HEADACHE, UNSPECIFIED CHRONICITY PATTERN, UNSPECIFIED HEADACHE TYPE: ICD-10-CM

## 2023-12-18 DIAGNOSIS — L40.50 PSORIATIC ARTHRITIS: ICD-10-CM

## 2023-12-18 DIAGNOSIS — R05.3 CHRONIC COUGH: ICD-10-CM

## 2023-12-18 NOTE — TELEPHONE ENCOUNTER
----- Message from Patrizia Gay PA-C sent at 12/15/2023  3:06 PM CST -----  Please reach out to follow up on uncontrolled HTN.Not currently followed by primary care. I referred to cardiology at her last visit

## 2023-12-18 NOTE — TELEPHONE ENCOUNTER
What pharmacy would patient like Breo inhaler to go to? Selected pharmacy [facility]? Updated qty and refills

## 2023-12-18 NOTE — TELEPHONE ENCOUNTER
Staff,  Please reach out to patient to schedule her for an appointment with me to discuss elevated BP. Thanks!

## 2023-12-19 RX ORDER — FLUTICASONE FUROATE AND VILANTEROL 200; 25 UG/1; UG/1
1 POWDER RESPIRATORY (INHALATION) DAILY
Qty: 180 EACH | Refills: 3 | Status: SHIPPED | OUTPATIENT
Start: 2023-12-19 | End: 2024-03-04 | Stop reason: ALTCHOICE

## 2023-12-20 ENCOUNTER — PATIENT MESSAGE (OUTPATIENT)
Dept: RHEUMATOLOGY | Facility: CLINIC | Age: 63
End: 2023-12-20
Payer: MEDICARE

## 2023-12-21 DIAGNOSIS — L40.50 PSORIATIC ARTHRITIS: ICD-10-CM

## 2023-12-21 DIAGNOSIS — G89.4 CHRONIC PAIN SYNDROME: ICD-10-CM

## 2023-12-21 DIAGNOSIS — M06.00 SERONEGATIVE RHEUMATOID ARTHRITIS: ICD-10-CM

## 2023-12-21 RX ORDER — PREDNISONE 2.5 MG/1
10 TABLET ORAL
Qty: 120 TABLET | Refills: 6 | OUTPATIENT
Start: 2023-12-21

## 2023-12-21 RX ORDER — CYCLOBENZAPRINE HCL 10 MG
TABLET ORAL
Qty: 90 TABLET | Refills: 6 | OUTPATIENT
Start: 2023-12-21

## 2023-12-22 DIAGNOSIS — L40.50 PSORIATIC ARTHRITIS: ICD-10-CM

## 2023-12-22 DIAGNOSIS — G89.4 CHRONIC PAIN SYNDROME: ICD-10-CM

## 2023-12-22 DIAGNOSIS — M06.00 SERONEGATIVE RHEUMATOID ARTHRITIS: ICD-10-CM

## 2023-12-22 DIAGNOSIS — E78.5 HYPERLIPIDEMIA, UNSPECIFIED HYPERLIPIDEMIA TYPE: ICD-10-CM

## 2023-12-22 RX ORDER — PREDNISONE 2.5 MG/1
10 TABLET ORAL
Qty: 120 TABLET | Refills: 3 | Status: SHIPPED | OUTPATIENT
Start: 2023-12-22

## 2023-12-22 RX ORDER — CYCLOBENZAPRINE HCL 10 MG
TABLET ORAL
Qty: 90 TABLET | Refills: 11 | OUTPATIENT
Start: 2023-12-22

## 2023-12-27 DIAGNOSIS — R05.3 CHRONIC COUGH: ICD-10-CM

## 2023-12-27 DIAGNOSIS — L40.8 PSORIASIS WITH PUSTULES: ICD-10-CM

## 2023-12-27 DIAGNOSIS — J44.89 COPD (CHRONIC OBSTRUCTIVE PULMONARY DISEASE) WITH CHRONIC BRONCHITIS: ICD-10-CM

## 2023-12-27 DIAGNOSIS — L40.50 PSORIATIC ARTHRITIS: ICD-10-CM

## 2023-12-27 DIAGNOSIS — F41.9 ANXIETY AND DEPRESSION: ICD-10-CM

## 2023-12-27 DIAGNOSIS — F32.A ANXIETY AND DEPRESSION: ICD-10-CM

## 2023-12-27 DIAGNOSIS — R51.9 NONINTRACTABLE HEADACHE, UNSPECIFIED CHRONICITY PATTERN, UNSPECIFIED HEADACHE TYPE: ICD-10-CM

## 2023-12-27 RX ORDER — ATORVASTATIN CALCIUM 20 MG/1
20 TABLET, FILM COATED ORAL
Qty: 90 TABLET | Refills: 11 | Status: SHIPPED | OUTPATIENT
Start: 2023-12-27 | End: 2024-03-04

## 2023-12-29 RX ORDER — FLUTICASONE FUROATE AND VILANTEROL 200; 25 UG/1; UG/1
1 POWDER RESPIRATORY (INHALATION) DAILY
Qty: 180 EACH | Refills: 3 | OUTPATIENT
Start: 2023-12-29

## 2024-01-03 ENCOUNTER — TELEPHONE (OUTPATIENT)
Dept: RHEUMATOLOGY | Facility: CLINIC | Age: 64
End: 2024-01-03
Payer: MEDICARE

## 2024-01-03 NOTE — TELEPHONE ENCOUNTER
----- Message from Helena Cha sent at 1/3/2024 11:48 AM CST -----  Contact: self  Type:  Needs Medical Advice    Who Called: self  Symptoms (please be specific): pt is wondering if she needs to come in for 1:15pm today to talk about her bp being high. Pt sts someone called and said she had an appt.     Would the patient rather a call back or a response via [facility]? call  Best Call Back Number: [phone] (home)     Additional Information: please advise and thank you.

## 2024-01-05 NOTE — TELEPHONE ENCOUNTER
Staff,  Patient did not read previous [facility] message that was sent. Please call her to schedule her for an appointment with me to discuss uncontrolled HTN. Thanks!

## 2024-01-10 NOTE — TELEPHONE ENCOUNTER
Spoke with patient in regards to uncontrolled BP. Nurse informed office wants her to schedule appointment with our clinical pharmacist to go over blood pressure. Patient stated will call office back once she has a ride.

## 2024-02-12 ENCOUNTER — TELEPHONE (OUTPATIENT)
Dept: RHEUMATOLOGY | Facility: CLINIC | Age: 64
End: 2024-02-12
Payer: MEDICARE

## 2024-02-12 NOTE — TELEPHONE ENCOUNTER
Spoke with patient about an appointment with Dr Vines for elevated blood pressure. Patient stated she will be in the [place] area around the 5th of March. Nurse informed Dr Vines has a 115 appointment on the 6th. Stated that will work.

## 2024-03-04 ENCOUNTER — CLINICAL SUPPORT (OUTPATIENT)
Dept: RHEUMATOLOGY | Facility: CLINIC | Age: 64
End: 2024-03-04
Payer: MEDICARE

## 2024-03-04 ENCOUNTER — TELEPHONE (OUTPATIENT)
Dept: RHEUMATOLOGY | Facility: CLINIC | Age: 64
End: 2024-03-04
Payer: MEDICARE

## 2024-03-04 VITALS
DIASTOLIC BLOOD PRESSURE: 96 MMHG | HEART RATE: 91 BPM | SYSTOLIC BLOOD PRESSURE: 157 MMHG | WEIGHT: 111.56 LBS | BODY MASS INDEX: 21.9 KG/M2 | HEIGHT: 60 IN

## 2024-03-04 DIAGNOSIS — L40.50 PSORIATIC ARTHRITIS: ICD-10-CM

## 2024-03-04 DIAGNOSIS — M06.00 SERONEGATIVE RHEUMATOID ARTHRITIS: ICD-10-CM

## 2024-03-04 DIAGNOSIS — J44.1 COPD EXACERBATION: ICD-10-CM

## 2024-03-04 DIAGNOSIS — M17.0 PRIMARY OSTEOARTHRITIS OF BOTH KNEES: ICD-10-CM

## 2024-03-04 DIAGNOSIS — G89.4 CHRONIC PAIN SYNDROME: ICD-10-CM

## 2024-03-04 DIAGNOSIS — R05.3 CHRONIC COUGH: ICD-10-CM

## 2024-03-04 DIAGNOSIS — Z72.0 TOBACCO USE: ICD-10-CM

## 2024-03-04 DIAGNOSIS — F32.A ANXIETY AND DEPRESSION: ICD-10-CM

## 2024-03-04 DIAGNOSIS — J44.89 COPD (CHRONIC OBSTRUCTIVE PULMONARY DISEASE) WITH CHRONIC BRONCHITIS: ICD-10-CM

## 2024-03-04 DIAGNOSIS — F51.01 PRIMARY INSOMNIA: ICD-10-CM

## 2024-03-04 DIAGNOSIS — F41.9 ANXIETY AND DEPRESSION: ICD-10-CM

## 2024-03-04 DIAGNOSIS — Z51.81 MEDICATION MONITORING ENCOUNTER: ICD-10-CM

## 2024-03-04 DIAGNOSIS — I10 HYPERTENSION, UNSPECIFIED TYPE: Primary | ICD-10-CM

## 2024-03-04 PROCEDURE — 99215 OFFICE O/P EST HI 40 MIN: CPT | Mod: PBBFAC,PN

## 2024-03-04 PROCEDURE — 99999 PR PBB SHADOW E&M-EST. PATIENT-LVL V: CPT | Mod: PBBFAC,,,

## 2024-03-04 RX ORDER — DEXTROMETHORPHAN HYDROBROMIDE, GUAIFENESIN 5; 100 MG/5ML; MG/5ML
650 LIQUID ORAL EVERY 8 HOURS PRN
COMMUNITY

## 2024-03-04 RX ORDER — VARENICLINE TARTRATE 1 MG/1
1 TABLET, FILM COATED ORAL 2 TIMES DAILY
Qty: 60 TABLET | Refills: 3 | Status: SHIPPED | OUTPATIENT
Start: 2024-03-04 | End: 2024-03-04 | Stop reason: SDUPTHER

## 2024-03-04 RX ORDER — VARENICLINE TARTRATE 1 MG/1
1 TABLET, FILM COATED ORAL 2 TIMES DAILY
Qty: 60 TABLET | Refills: 3 | Status: SHIPPED | OUTPATIENT
Start: 2024-03-04

## 2024-03-04 RX ORDER — LISINOPRIL 30 MG/1
30 TABLET ORAL DAILY
Qty: 30 TABLET | Refills: 11 | Status: SHIPPED | OUTPATIENT
Start: 2024-03-04 | End: 2024-04-09

## 2024-03-04 RX ORDER — AZITHROMYCIN 250 MG/1
TABLET, FILM COATED ORAL
Qty: 6 TABLET | Refills: 0 | Status: SHIPPED | OUTPATIENT
Start: 2024-03-04 | End: 2024-03-09

## 2024-03-04 RX ORDER — VARENICLINE TARTRATE 0.5 (11)-1
KIT ORAL
Qty: 1 EACH | Refills: 0 | Status: SHIPPED | OUTPATIENT
Start: 2024-03-04

## 2024-03-04 RX ORDER — DIPHENHYDRAMINE HCL 25 MG
4 CAPSULE ORAL
Qty: 100 EACH | Refills: 2 | Status: SHIPPED | OUTPATIENT
Start: 2024-03-04

## 2024-03-04 RX ORDER — FLUTICASONE FUROATE, UMECLIDINIUM BROMIDE AND VILANTEROL TRIFENATATE 200; 62.5; 25 UG/1; UG/1; UG/1
1 POWDER RESPIRATORY (INHALATION) DAILY
Qty: 28 EACH | Refills: 11 | Status: CANCELLED | OUTPATIENT
Start: 2024-03-04

## 2024-03-04 RX ORDER — LIDOCAINE AND PRILOCAINE 25; 25 MG/G; MG/G
CREAM TOPICAL
Qty: 30 G | Refills: 3 | Status: SHIPPED | OUTPATIENT
Start: 2024-03-04 | End: 2024-05-13

## 2024-03-04 RX ORDER — VARENICLINE TARTRATE 0.5 (11)-1
KIT ORAL
Qty: 1 EACH | Refills: 0 | Status: SHIPPED | OUTPATIENT
Start: 2024-03-04 | End: 2024-03-04 | Stop reason: SDUPTHER

## 2024-03-04 RX ORDER — DICLOFENAC SODIUM 20 MG/G
40 SOLUTION TOPICAL 2 TIMES DAILY
Qty: 112 EACH | Refills: 3 | Status: SHIPPED | OUTPATIENT
Start: 2024-03-04 | End: 2024-06-08

## 2024-03-04 NOTE — TELEPHONE ENCOUNTER
Saw patient today for HTN follow up. Discussed COPD, smoking cessation, HTN, PsA/RA    Placed referrals for pulmonology, family medicine, and the smoking cessation program    Staff,  Please schedule patient for the needed referrals above. Thanks!

## 2024-03-04 NOTE — PROGRESS NOTES
Subjective:     Patient ID:  Barbara Mims    Rheumatology Provider: Dr. Rodriguez    Chief Complaint: Disease Management     History of Present Illness:  Pt is a 63 y.o. female with PsA, seronegative RA, osteoporosis, fibromyalgia, HTN, COPD, and hypothyroidism who presents today for HTN f/u. Last clinic visit was 12/14/23.     HTN:  Taking: OTC ibuprofen 400 mg daily, prednisone 5 mg daily  Current treatments: lisinopril 20 mg (taking 2 of the 10 mg tab) Daily  Was taking lisinopril 40 mg but stated that her BP dropped too low so she decreased the dose  Hasn't started amlodipine 5 mg daily  Taking BP at home; pulse around 100, BP around 160s/100s  Not aware of BP goal  Has no PCP that she is following with  Has blurry vision sometimes  Increased stress  No fast food; cooks a lot of her own meals; eats a lot of vegetables  Denied drinking coffee; drinks lalit sweet tea; denied drinking sodas  Denied alcohol use  Smokes cigarettes  Has headaches, feels bad, and blurry vision  Had nausea last week and 4 times of emesis; had really bad headache; took firocet and headache and nausea went away  Not exercising d/t joints and neck hurting all the time    COPD:  Having fever everyday for last month or so - between 100 and 101   Spitting up green/brown phlem throughout the day and wakes up throughout night coughing  Increased sputum volume as well over last month  Taking cough syrup OTC with DM but doesn't help  Takes albuterol and breo inhaler but it is not working  Not following with pulmonology     Smoking Cessation:  Smoking 3 cigarette a day; decreased from 1 ppd  1 cigarette within 30 mins of appointment today  As soon as she wakes up smokes a cigarette  Open to stopping smoking and wants to stop smoking  Has developed a chronic cough over the last 6 years  Shailesh is on O2 and she helps take care of him  Previously on Chantix in the past and able to decrease down to 1 cigarette a day  Was changed to the generic  Chantix and stated that it did not work for her at all; requested brand only  No loose teeth or dentures; ok with using NRT gum for breakthrough cravings    RA/PsA:  Stop taking HCQ bc made her feel weak and dizzy; Last took it months ago  Had eye exam 2 years ago but will call to schedule   Bad leg cramps especially at night; moves throughout the night because they hurting; generalized pain all over legs   Feels like someone is pulling on legs; 8/10; started about a couple weeks ago  Neurontin doesn't help; flexeril doesn't help - does help joint pain though  Increased joint pain that is affecting her daily life; increased formation of nodules on her hands  Increased fatigue    Reviewed allergies and all current medications including OTC, herbals and supplements.     Review of Systems   Review of Systems   Constitutional:  Positive for activity change, fatigue and fever.   Eyes:  Positive for visual disturbance.   Respiratory:  Positive for cough and shortness of breath.    Musculoskeletal:  Positive for arthralgias, back pain, joint swelling, myalgias, neck pain and neck stiffness.   Skin:  Positive for wound.   Allergic/Immunologic: Positive for immunocompromised state.   Neurological:  Positive for weakness and headaches.      Current Medications:  Current Outpatient Medications   Medication Instructions    acetaminophen (TYLENOL) 650 mg, Oral, Every 8 hours PRN    albuterol (VENTOLIN HFA) 90 mcg/actuation inhaler 2 puffs, Inhalation, Every 6 hours PRN    alendronate (FOSAMAX) 70 mg, Oral, Every 7 days    amLODIPine (NORVASC) 5 mg, Oral, Daily    aspirin (ECOTRIN) 81 mg, Oral, Daily    azithromycin (Z-MEHNAZ) 250 MG tablet Take 2 tablets by mouth on day 1; Take 1 tablet by mouth on days 2-5<BR>    butalbital-acetaminophen-caffeine -40 mg (FIORICET, ESGIC) -40 mg per tablet TAKE ONE TABLET BY MOUTH EVERY 6 HOURS AS NEEDED FOR PAIN    cyclobenzaprine (FLEXERIL) 10 MG tablet TAKE ONE TABLET BY MOUTH THREE  TIMES DAILY AS NEEDED FOR MUSCLE SPASMS    cyproheptadine (PERIACTIN) 4 mg, Oral, Nightly    diclofenac sodium (PENNSAID) 40 mg, Topical (Top), 2 times daily    DOCOSAHEXAENOIC ACID ORAL 1 g, Oral    fluticasone-umeclidin-vilanter (TRELEGY ELLIPTA) 100-62.5-25 mcg DsDv 1 puff, Inhalation, Daily    gabapentin (NEURONTIN) 600 mg, Oral, 2 times daily    HYDROcodone-acetaminophen (NORCO)  mg per tablet 1 tablet, Oral, Every 8 hours PRN    HYDROcodone-acetaminophen (NORCO)  mg per tablet 1 tablet, Oral, Every 8 hours PRN    HYDROcodone-acetaminophen (NORCO)  mg per tablet 1 tablet, Oral, Every 8 hours PRN    hydroxychloroquine (PLAQUENIL) 200 mg, Oral, Daily    levocetirizine (XYZAL) 5 mg, Oral, Nightly    levothyroxine (SYNTHROID) 50 mcg, Oral, Before breakfast    LIDOcaine-prilocaine (EMLA) cream APPLY TOPICALLY TO THE AFFECTED AREA(S) AS NEEDED    lisinopriL (PRINIVIL,ZESTRIL) 30 mg, Oral, Daily    nicotine polacrilex (NICORETTE) 4 mg, Oral, As needed (PRN), Chew 1 piece every 1-2 hours. (Max 24 pieces/day)    omega 3-dha-epa-fish oil 300-1,000 mg Cap 1 g, Oral, Every morning    omeprazole (PRILOSEC) 40 mg, Oral, Daily    paroxetine (PAXIL) 40 mg, Oral, Daily    predniSONE (DELTASONE) 10 mg, Oral    QUEtiapine (SEROQUEL) 150 mg, Oral, Nightly    rosuvastatin (CRESTOR) 20 mg, Oral, Nightly    varenicline (CHANTIX STARTING MONTH BOX) 0.5 mg (11)- 1 mg (42) tablet Take one 0.5mg tab by mouth once daily X3 days,then increase to one 0.5mg tab twice daily X4 days,then increase to one 1mg tab twice daily    varenicline (CHANTIX) 1 mg, Oral, 2 times daily     Objective:     Vitals:    03/04/24 1313   BP: (!) 157/96   Pulse: 91   Weight: 50.6 kg (111 lb 8.8 oz)   Height: 5' (1.524 m)   PainSc:   7   PainLoc: Generalized      Body mass index is 21.79 kg/m².     O2 Saturation: 88%    Monitoring Lab Results:  Lab Results   Component Value Date    WBC 9.90 12/05/2023    RBC 4.00 12/05/2023    HGB 13.2 12/05/2023     HCT 40.8 12/05/2023     (H) 12/05/2023    MCH 33.0 (H) 12/05/2023    MCHC 32.4 12/05/2023    RDW 12.3 12/05/2023     12/05/2023      Lab Results   Component Value Date     12/05/2023    K 4.4 12/05/2023     12/05/2023    CO2 23 12/05/2023     12/05/2023    BUN 15 12/05/2023    CREATININE 0.7 12/05/2023    CALCIUM 9.8 12/05/2023    PROT 7.8 12/05/2023    ALBUMIN 4.1 12/05/2023    BILITOT 0.1 12/05/2023    ALKPHOS 142 (H) 12/05/2023    AST 21 12/05/2023    ALT 18 12/05/2023    ANIONGAP 12 12/05/2023    EGFRNORACEVR >60.0 12/05/2023     Lab Results   Component Value Date    SEDRATE 11 12/05/2023    CRP 4.6 12/05/2023        Lab Results   Component Value Date    QSGWBIXY58XT 31 09/22/2022      Lab Results   Component Value Date    CHOL 114 05/05/2023    HDL 46 05/05/2023    LDLCALC 56 05/05/2023    TRIG 58 05/05/2023     Lab Results   Component Value Date    RF <10.0 07/13/2015    CCPANTIBODIE <0.5 11/20/2014     Lab Results   Component Value Date    ANASCREEN Negative <1:160 04/13/2018    DSDNA Positive 1:320 11/20/2014     Infectious Disease Screening:  Lab Results   Component Value Date    HEPBSAG Non-reactive 12/05/2023    HEPBCAB Non-reactive 12/05/2023    HEPBSAB <3.00 12/05/2023    HEPBSAB Non-reactive 12/05/2023    HEPBIGM Non-reactive 12/05/2023     Lab Results   Component Value Date    HEPCAB Non-reactive 12/05/2023     Lab Results   Component Value Date    TBGOLDPLUS Negative 12/05/2023     Assessment:     Pt is a 63 y.o. female with HTN, COPD, tobacco use, RA, and PsA. HTN is uncontrolled. Patient will follow up with me for BP control and management. Lisinopril increased to 30 mg to help with BP. Having a COPD exacerbation so chinedu-jane sent to pharmacy. To stop Breo and start Trelegy once approved. Referred to pulmonologist. Interested in smoking cessation. Chantix and NRT gum initiated. Referred to smoking cessation program and a PCP. Reviewed how to properly use the nicotine  gum.      Plan:      Encounter Diagnoses   Name Primary?    Psoriatic arthritis     Seronegative rheumatoid arthritis     Chronic pain syndrome     Primary osteoarthritis of both knees     Hypertension, unspecified type Yes    COPD exacerbation     Tobacco use     COPD (chronic obstructive pulmonary disease) with chronic bronchitis     Chronic cough     Medication monitoring encounter      Place referrals for pulmonology, PCP, smoking cessation   Increased lisnipril to 30 mg daily  Stop Breo and initiate Trelegy once approved  Initiate azithromycin for COPD exacerbation  Initiate NRT 4 mg gum PRN every 1-2 hours  Initiate Chantix  Sent needed refills to pharmacy    Follow-up with me 3/18/24  Follow-up scheduled on 4/9/24 with Dr. Jennifer Vines, PharmD, BCPS  Rheumatology Clinical Pharmacist  [facility]

## 2024-03-04 NOTE — PATIENT INSTRUCTIONS
It was a pleasure meeting you today. As a reminder, my name is Dr. Susu Vines. I'm the clinical pharmacist in the Rheumatology office. If you have any questions or need any assistance, please reach out to the office.    Next blood pressure appointment in office on Monday, 3/18/24 at 1:30p

## 2024-03-06 DIAGNOSIS — L40.8 PSORIASIS WITH PUSTULES: ICD-10-CM

## 2024-03-06 DIAGNOSIS — G89.4 CHRONIC PAIN SYNDROME: ICD-10-CM

## 2024-03-06 DIAGNOSIS — L40.50 PSORIATIC ARTHRITIS: ICD-10-CM

## 2024-03-07 RX ORDER — HYDROCODONE BITARTRATE AND ACETAMINOPHEN 10; 325 MG/1; MG/1
1 TABLET ORAL EVERY 8 HOURS PRN
Qty: 90 TABLET | Refills: 0 | Status: SHIPPED | OUTPATIENT
Start: 2024-03-07 | End: 2024-04-09 | Stop reason: SDUPTHER

## 2024-03-18 ENCOUNTER — TELEPHONE (OUTPATIENT)
Dept: RHEUMATOLOGY | Facility: CLINIC | Age: 64
End: 2024-03-18
Payer: MEDICARE

## 2024-03-18 NOTE — TELEPHONE ENCOUNTER
Patient appointment was cancelled today 3/18 but not rescheduled.    Staff,  Please call patient to reschedule her for appointment with me. Thanks!

## 2024-03-19 NOTE — TELEPHONE ENCOUNTER
Attempted to contact patient to reschedule appointment from yesterday. Unable to reach phone disconnected.

## 2024-03-27 DIAGNOSIS — L40.50 PSORIATIC ARTHRITIS: ICD-10-CM

## 2024-03-27 DIAGNOSIS — M06.00 SERONEGATIVE RHEUMATOID ARTHRITIS: ICD-10-CM

## 2024-03-27 RX ORDER — HYDROXYCHLOROQUINE SULFATE 200 MG/1
200 TABLET, FILM COATED ORAL DAILY
Qty: 90 TABLET | Refills: 3 | Status: SHIPPED | OUTPATIENT
Start: 2024-03-27

## 2024-03-27 NOTE — TELEPHONE ENCOUNTER
Pharmacy requesting refill on Hydroxychloroquine 200mg  Pt's LOV 12/14/2023  Pt's NOV 04/09/2024  Medication pending

## 2024-04-01 DIAGNOSIS — L40.8 PSORIASIS WITH PUSTULES: ICD-10-CM

## 2024-04-01 DIAGNOSIS — F32.A ANXIETY AND DEPRESSION: ICD-10-CM

## 2024-04-01 DIAGNOSIS — L40.50 PSORIATIC ARTHRITIS: ICD-10-CM

## 2024-04-01 DIAGNOSIS — J44.89 COPD (CHRONIC OBSTRUCTIVE PULMONARY DISEASE) WITH CHRONIC BRONCHITIS: ICD-10-CM

## 2024-04-01 DIAGNOSIS — F41.9 ANXIETY AND DEPRESSION: ICD-10-CM

## 2024-04-01 DIAGNOSIS — R05.3 CHRONIC COUGH: ICD-10-CM

## 2024-04-01 DIAGNOSIS — R51.9 NONINTRACTABLE HEADACHE, UNSPECIFIED CHRONICITY PATTERN, UNSPECIFIED HEADACHE TYPE: ICD-10-CM

## 2024-04-03 RX ORDER — CYPROHEPTADINE HYDROCHLORIDE 4 MG/1
4 TABLET ORAL NIGHTLY
Qty: 30 TABLET | Refills: 3 | Status: SHIPPED | OUTPATIENT
Start: 2024-04-03

## 2024-04-04 ENCOUNTER — LAB VISIT (OUTPATIENT)
Dept: LAB | Facility: HOSPITAL | Age: 64
End: 2024-04-04
Attending: PHYSICIAN ASSISTANT
Payer: MEDICARE

## 2024-04-04 DIAGNOSIS — E55.9 VITAMIN D DEFICIENCY: ICD-10-CM

## 2024-04-04 DIAGNOSIS — L40.50 PSORIATIC ARTHRITIS: ICD-10-CM

## 2024-04-04 LAB
25(OH)D3+25(OH)D2 SERPL-MCNC: 15 NG/ML (ref 30–96)
ALBUMIN SERPL BCP-MCNC: 3.6 G/DL (ref 3.5–5.2)
ALP SERPL-CCNC: 146 U/L (ref 55–135)
ALT SERPL W/O P-5'-P-CCNC: 9 U/L (ref 10–44)
ANION GAP SERPL CALC-SCNC: 10 MMOL/L (ref 8–16)
AST SERPL-CCNC: 19 U/L (ref 10–40)
BASOPHILS # BLD AUTO: 0.15 K/UL (ref 0–0.2)
BASOPHILS NFR BLD: 1.5 % (ref 0–1.9)
BILIRUB SERPL-MCNC: 0.1 MG/DL (ref 0.1–1)
BUN SERPL-MCNC: 17 MG/DL (ref 8–23)
CALCIUM SERPL-MCNC: 9.1 MG/DL (ref 8.7–10.5)
CHLORIDE SERPL-SCNC: 105 MMOL/L (ref 95–110)
CO2 SERPL-SCNC: 26 MMOL/L (ref 23–29)
CREAT SERPL-MCNC: 0.9 MG/DL (ref 0.5–1.4)
CRP SERPL-MCNC: 13.3 MG/L (ref 0–8.2)
DIFFERENTIAL METHOD BLD: ABNORMAL
EOSINOPHIL # BLD AUTO: 0.5 K/UL (ref 0–0.5)
EOSINOPHIL NFR BLD: 5.2 % (ref 0–8)
ERYTHROCYTE [DISTWIDTH] IN BLOOD BY AUTOMATED COUNT: 12.6 % (ref 11.5–14.5)
ERYTHROCYTE [SEDIMENTATION RATE] IN BLOOD BY WESTERGREN METHOD: 52 MM/HR (ref 0–20)
EST. GFR  (NO RACE VARIABLE): >60 ML/MIN/1.73 M^2
GLUCOSE SERPL-MCNC: 88 MG/DL (ref 70–110)
HCT VFR BLD AUTO: 35.6 % (ref 37–48.5)
HGB BLD-MCNC: 10.8 G/DL (ref 12–16)
IMM GRANULOCYTES # BLD AUTO: 0.03 K/UL (ref 0–0.04)
IMM GRANULOCYTES NFR BLD AUTO: 0.3 % (ref 0–0.5)
LYMPHOCYTES # BLD AUTO: 3.2 K/UL (ref 1–4.8)
LYMPHOCYTES NFR BLD: 30.9 % (ref 18–48)
MCH RBC QN AUTO: 31.1 PG (ref 27–31)
MCHC RBC AUTO-ENTMCNC: 30.3 G/DL (ref 32–36)
MCV RBC AUTO: 103 FL (ref 82–98)
MONOCYTES # BLD AUTO: 0.7 K/UL (ref 0.3–1)
MONOCYTES NFR BLD: 6.5 % (ref 4–15)
NEUTROPHILS # BLD AUTO: 5.8 K/UL (ref 1.8–7.7)
NEUTROPHILS NFR BLD: 55.6 % (ref 38–73)
NRBC BLD-RTO: 0 /100 WBC
PLATELET # BLD AUTO: 425 K/UL (ref 150–450)
PMV BLD AUTO: 9.3 FL (ref 9.2–12.9)
POTASSIUM SERPL-SCNC: 4.8 MMOL/L (ref 3.5–5.1)
PROT SERPL-MCNC: 6.9 G/DL (ref 6–8.4)
RBC # BLD AUTO: 3.47 M/UL (ref 4–5.4)
SODIUM SERPL-SCNC: 141 MMOL/L (ref 136–145)
WBC # BLD AUTO: 10.33 K/UL (ref 3.9–12.7)

## 2024-04-04 PROCEDURE — 82306 VITAMIN D 25 HYDROXY: CPT | Performed by: PHYSICIAN ASSISTANT

## 2024-04-04 PROCEDURE — 80053 COMPREHEN METABOLIC PANEL: CPT | Performed by: PHYSICIAN ASSISTANT

## 2024-04-04 PROCEDURE — 86140 C-REACTIVE PROTEIN: CPT | Performed by: PHYSICIAN ASSISTANT

## 2024-04-04 PROCEDURE — 85651 RBC SED RATE NONAUTOMATED: CPT | Mod: PO | Performed by: PHYSICIAN ASSISTANT

## 2024-04-04 PROCEDURE — 36415 COLL VENOUS BLD VENIPUNCTURE: CPT | Mod: PO | Performed by: PHYSICIAN ASSISTANT

## 2024-04-04 PROCEDURE — 85025 COMPLETE CBC W/AUTO DIFF WBC: CPT | Performed by: PHYSICIAN ASSISTANT

## 2024-04-09 ENCOUNTER — OFFICE VISIT (OUTPATIENT)
Dept: RHEUMATOLOGY | Facility: CLINIC | Age: 64
End: 2024-04-09
Payer: MEDICARE

## 2024-04-09 VITALS
WEIGHT: 111.88 LBS | SYSTOLIC BLOOD PRESSURE: 180 MMHG | DIASTOLIC BLOOD PRESSURE: 85 MMHG | HEIGHT: 60 IN | HEART RATE: 89 BPM | BODY MASS INDEX: 21.97 KG/M2

## 2024-04-09 DIAGNOSIS — M79.7 FIBROMYALGIA: ICD-10-CM

## 2024-04-09 DIAGNOSIS — J44.89 COPD (CHRONIC OBSTRUCTIVE PULMONARY DISEASE) WITH CHRONIC BRONCHITIS: ICD-10-CM

## 2024-04-09 DIAGNOSIS — L40.50 PSORIATIC ARTHRITIS: ICD-10-CM
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
## 2024-04-09 DIAGNOSIS — F32.A ANXIETY AND DEPRESSION: ICD-10-CM

## 2024-04-09 DIAGNOSIS — R05.3 CHRONIC COUGH: ICD-10-CM

## 2024-04-09 DIAGNOSIS — I10 HYPERTENSION, UNSPECIFIED TYPE: Primary | ICD-10-CM

## 2024-04-09 DIAGNOSIS — G89.4 CHRONIC PAIN SYNDROME: ICD-10-CM

## 2024-04-09 DIAGNOSIS — F41.9 ANXIETY AND DEPRESSION: ICD-10-CM

## 2024-04-09 DIAGNOSIS — M06.00 SERONEGATIVE RHEUMATOID ARTHRITIS: ICD-10-CM

## 2024-04-09 DIAGNOSIS — L40.8 PSORIASIS WITH PUSTULES: ICD-10-CM

## 2024-04-09 DIAGNOSIS — J40 BRONCHITIS: ICD-10-CM

## 2024-04-09 DIAGNOSIS — R51.9 NONINTRACTABLE HEADACHE, UNSPECIFIED CHRONICITY PATTERN, UNSPECIFIED HEADACHE TYPE: ICD-10-CM

## 2024-04-09 PROCEDURE — 99215 OFFICE O/P EST HI 40 MIN: CPT | Mod: PBBFAC,PN | Performed by: INTERNAL MEDICINE

## 2024-04-09 PROCEDURE — 99215 OFFICE O/P EST HI 40 MIN: CPT | Mod: S$PBB,,, | Performed by: INTERNAL MEDICINE

## 2024-04-09 PROCEDURE — 99999 PR PBB SHADOW E&M-EST. PATIENT-LVL V: CPT | Mod: PBBFAC,,, | Performed by: INTERNAL MEDICINE

## 2024-04-09 RX ORDER — HYDROCODONE BITARTRATE AND ACETAMINOPHEN 10; 325 MG/1; MG/1
1 TABLET ORAL EVERY 8 HOURS PRN
Qty: 90 TABLET | Refills: 0 | Status: SHIPPED | OUTPATIENT
Start: 2024-06-28

## 2024-04-09 RX ORDER — METOPROLOL SUCCINATE 25 MG/1
25 TABLET, EXTENDED RELEASE ORAL NIGHTLY
Qty: 90 TABLET | Refills: 3 | Status: SHIPPED | OUTPATIENT
Start: 2024-04-09 | End: 2025-04-09

## 2024-04-09 RX ORDER — HYDROCODONE BITARTRATE AND ACETAMINOPHEN 10; 325 MG/1; MG/1
1 TABLET ORAL EVERY 8 HOURS PRN
Qty: 90 TABLET | Refills: 0 | Status: SHIPPED | OUTPATIENT
Start: 2024-05-29

## 2024-04-09 RX ORDER — UPADACITINIB 15 MG/1
15 TABLET, EXTENDED RELEASE ORAL DAILY
Qty: 30 TABLET | Refills: 11 | Status: ACTIVE | OUTPATIENT
Start: 2024-04-09 | End: 2025-04-09

## 2024-04-09 RX ORDER — TRIAMCINOLONE ACETONIDE 1 MG/G
OINTMENT TOPICAL 2 TIMES DAILY
Qty: 80 G | Refills: 0 | Status: SHIPPED | OUTPATIENT
Start: 2024-04-09 | End: 2024-04-24

## 2024-04-09 RX ORDER — AMLODIPINE BESYLATE 10 MG/1
10 TABLET ORAL DAILY
Qty: 90 TABLET | Refills: 3 | Status: SHIPPED | OUTPATIENT
Start: 2024-04-09 | End: 2025-04-09

## 2024-04-09 RX ORDER — BUDESONIDE 0.5 MG/2ML
0.5 INHALANT ORAL DAILY
Qty: 60 ML | Refills: 11 | Status: SHIPPED | OUTPATIENT
Start: 2024-04-09 | End: 2025-04-09

## 2024-04-09 RX ORDER — HYDROXYZINE HYDROCHLORIDE 25 MG/1
25 TABLET, FILM COATED ORAL 3 TIMES DAILY
Qty: 45 TABLET | Refills: 0 | Status: SHIPPED | OUTPATIENT
Start: 2024-04-09 | End: 2024-04-24

## 2024-04-09 RX ORDER — HYDROCODONE BITARTRATE AND ACETAMINOPHEN 10; 325 MG/1; MG/1
1 TABLET ORAL EVERY 8 HOURS PRN
Qty: 90 TABLET | Refills: 0 | Status: SHIPPED | OUTPATIENT
Start: 2024-04-30

## 2024-04-09 RX ORDER — IPRATROPIUM BROMIDE 0.5 MG/2.5ML
500 SOLUTION RESPIRATORY (INHALATION) 4 TIMES DAILY
Qty: 75 ML | Refills: 0 | Status: SHIPPED | OUTPATIENT
Start: 2024-04-09 | End: 2025-04-09

## 2024-04-09 RX ORDER — DOXYCYCLINE HYCLATE 100 MG
100 TABLET ORAL 2 TIMES DAILY
Qty: 30 TABLET | Refills: 0 | Status: SHIPPED | OUTPATIENT
Start: 2024-04-09 | End: 2024-04-24

## 2024-04-09 RX ORDER — BUTALBITAL, ACETAMINOPHEN AND CAFFEINE 50; 325; 40 MG/1; MG/1; MG/1
1 TABLET ORAL EVERY 6 HOURS PRN
Qty: 120 TABLET | Refills: 3 | Status: SHIPPED | OUTPATIENT
Start: 2024-04-09

## 2024-04-09 ASSESSMENT — ROUTINE ASSESSMENT OF PATIENT INDEX DATA (RAPID3)
FATIGUE SCORE: 0
PATIENT GLOBAL ASSESSMENT SCORE: 6
PAIN SCORE: 7.5
MDHAQ FUNCTION SCORE: 1
PSYCHOLOGICAL DISTRESS SCORE: 2.2
TOTAL RAPID3 SCORE: 5.61

## 2024-04-09 NOTE — PROGRESS NOTES
Subjective:     Patient ID:  Barbara Mims    Chief Complaint:  Disease Management     History of Present Illness  Follow up: 63 year old female who presents to clinic for follow up on psoriatic arthritis, seronegative RA, and pain management.she has had a persistant dry cough x 6 years we though it was due to smoking but she has been on lisinipril x10 years She is doing poorly--reports increased pain and swelling in her PIP and DIP joints. She has destructive changes in her fingernails. She reports worsening psoriasis. She complains of increased pain in her feet (MTPs) and bottom of her first when she first puts weight on her feet in the morning. She has been compliant with plaquenil and prednisone 5 mg PRN for flares.    She has chronic neck pain s/p cervical surgery and is no longer followed by pain management after multiple unsuccessful interventional treatments. She is on norco and fioricet PRN for severe pain. She is taking gabapentin TID and occasionally adds lyrica 50 mg for severe pain.     We reviewed her recent DEXA consistent with osteoporosis. CT chest shows filling defect in the bronchus consistent with atelectasis. Pulmonary referral recommended for bronchoscopy. Coronary calcification also seen.      She also complains of multiple abscess in the L axilla.      Current treatment:  1. Norco 10/325 TID PRN for pain  2. OTC ibuprofen 400 mg daily  3. Prednisone 5 mg daily  4. plaquenil  Rheumatologic History:   - Diagnosis/es:  - Positive serologies:  - Infectious screening labs:  - Previous Treatments:  - Current Treatments:     Interval History:   Hospitalization since last office visit: No    Patient Active Problem List    Diagnosis Date Noted    Tobacco use 03/11/2019    Psoriatic arthritis 11/09/2014    Psoriasis with pustules 11/09/2014    Anxiety and depression 11/09/2014    Raynaud's disease 11/09/2014    Anemia  11/09/2014     Past Surgical History:   Procedure Laterality Date    CARDIAC  CATHETERIZATION      CERVICAL DISC SURGERY      HYSTERECTOMY      LITHOTRIPSY       Social History     Tobacco Use    Smoking status: Every Day     Current packs/day: 0.50     Average packs/day: 1 pack/day for 48.3 years (47.1 ttl pk-yrs)     Types: Cigarettes     Start date: 1976    Smokeless tobacco: Never   Substance Use Topics    Alcohol use: No    Drug use: No     Family History   Problem Relation Age of Onset    Heart disease Father     Heart disease Brother     Heart disease Brother      Review of patient's allergies indicates:   Allergen Reactions    Ancef [cefazolin] Anaphylaxis and Shortness Of Breath    Demerol [meperidine] Anaphylaxis    Lisinopril Other (See Comments)     cough    Otezla [apremilast] Other (See Comments)     Increased muscle spasms    Xanax [alprazolam] Other (See Comments)     Makes feel really bad       Review of Systems   Review of Systems   Constitutional:  Positive for chills and fatigue. Negative for activity change, appetite change, diaphoresis, fever and unexpected weight change.   HENT:  Negative for congestion, dental problem, ear discharge, ear pain, facial swelling, mouth sores, nosebleeds, postnasal drip, rhinorrhea, sinus pressure, sneezing, sore throat, tinnitus, trouble swallowing and voice change.    Eyes:  Negative for photophobia, pain, discharge, redness and itching.   Respiratory:  Positive for cough. Negative for apnea, chest tightness, shortness of breath and wheezing.    Cardiovascular:  Positive for leg swelling. Negative for chest pain and palpitations.   Gastrointestinal:  Positive for abdominal pain. Negative for abdominal distention, constipation, diarrhea, nausea and vomiting.   Endocrine: Negative for cold intolerance, heat intolerance, polydipsia and polyuria.   Genitourinary:  Negative for decreased urine volume, difficulty urinating, dysuria, flank pain, frequency, hematuria and urgency.   Musculoskeletal:  Positive for arthralgias, back pain, gait  problem, joint swelling, myalgias, neck pain and neck stiffness.   Skin:  Negative for pallor, rash and wound.   Allergic/Immunologic: Negative for immunocompromised state.   Neurological:  Negative for dizziness, tremors, numbness and headaches.   Hematological:  Negative for adenopathy. Does not bruise/bleed easily.   Psychiatric/Behavioral:  Negative for sleep disturbance. The patient is not nervous/anxious.         Current Medications:  Current Outpatient Medications   Medication Instructions    acetaminophen (TYLENOL) 650 mg, Oral, Every 8 hours PRN    albuterol (VENTOLIN HFA) 90 mcg/actuation inhaler 2 puffs, Inhalation, Every 6 hours PRN    alendronate (FOSAMAX) 70 mg, Oral, Every 7 days    amLODIPine (NORVASC) 10 mg, Oral, Daily    aspirin (ECOTRIN) 81 mg, Oral, Daily    budesonide (PULMICORT) 0.5 mg, Nebulization, Daily, Controller    butalbital-acetaminophen-caffeine -40 mg (FIORICET, ESGIC) -40 mg per tablet 1 tablet, Oral, Every 6 hours PRN    CHANTIX STARTING MONTH BOX 0.5 mg (11)- 1 mg (42) tablet Take one 0.5mg tab by mouth once daily X3 days,then increase to one 0.5mg tab twice daily X4 days,then increase to one 1mg tab twice daily    CHANTIX 1 mg, Oral, 2 times daily    cyclobenzaprine (FLEXERIL) 10 MG tablet TAKE ONE TABLET BY MOUTH THREE TIMES DAILY AS NEEDED FOR MUSCLE SPASMS    cyproheptadine (PERIACTIN) 4 mg, Oral, Nightly    diclofenac sodium (PENNSAID) 40 mg, Topical (Top), 2 times daily    DOCOSAHEXAENOIC ACID ORAL 1 g, Oral    doxycycline (VIBRA-TABS) 100 mg, Oral, 2 times daily    fluticasone-umeclidin-vilanter (TRELEGY ELLIPTA) 100-62.5-25 mcg DsDv 1 puff, Inhalation, Daily    gabapentin (NEURONTIN) 600 mg, Oral, 2 times daily    HYDROcodone-acetaminophen (NORCO)  mg per tablet 1 tablet, Oral, Every 8 hours PRN    HYDROcodone-acetaminophen (NORCO)  mg per tablet 1 tablet, Oral, Every 8 hours PRN    [START ON 4/30/2024] HYDROcodone-acetaminophen (NORCO)  mg  per tablet 1 tablet, Oral, Every 8 hours PRN    [START ON 5/29/2024] HYDROcodone-acetaminophen (NORCO)  mg per tablet 1 tablet, Oral, Every 8 hours PRN    [START ON 6/28/2024] HYDROcodone-acetaminophen (NORCO)  mg per tablet 1 tablet, Oral, Every 8 hours PRN    hydroxychloroquine (PLAQUENIL) 200 mg, Oral, Daily    hydrOXYzine HCL (ATARAX) 25 mg, Oral, 3 times daily    ipratropium (ATROVENT) 500 mcg, Nebulization, 4 times daily, Rescue    levocetirizine (XYZAL) 5 mg, Oral, Nightly    levothyroxine (SYNTHROID) 50 mcg, Oral, Before breakfast    LIDOcaine-prilocaine (EMLA) cream APPLY TOPICALLY TO THE AFFECTED AREA(S) AS NEEDED    metoprolol succinate (TOPROL-XL) 25 mg, Oral, Nightly    nicotine polacrilex (NICORETTE) 4 mg, Oral, As needed (PRN), Chew 1 piece every 1-2 hours. (Max 24 pieces/day)    omega 3-dha-epa-fish oil 300-1,000 mg Cap 1 g, Oral, Every morning    omeprazole (PRILOSEC) 40 mg, Oral, Daily    paroxetine (PAXIL) 40 mg, Oral, Daily    predniSONE (DELTASONE) 10 mg, Oral    QUEtiapine (SEROQUEL) 150 mg, Oral, Nightly    RINVOQ 15 mg, Oral, Daily    rosuvastatin (CRESTOR) 20 mg, Oral, Nightly    triamcinolone acetonide 0.1% (KENALOG) 0.1 % ointment Topical (Top), 2 times daily         Objective:     Vitals:    04/09/24 1124   BP: (!) 180/85   Pulse: 89   Weight: 50.8 kg (111 lb 14.1 oz)   Height: 5' (1.524 m)   PainSc:   7   PainLoc: Generalized      Body mass index is 21.85 kg/m².     Physical Examinations:  Physical Exam   Constitutional: She is oriented to person, place, and time.   HENT:   Head: Normocephalic and atraumatic.   Mouth/Throat: Oropharynx is clear and moist.   Eyes: Pupils are equal, round, and reactive to light.   Neck: No thyromegaly present.   Cardiovascular: Normal rate, regular rhythm and normal heart sounds. Exam reveals no gallop and no friction rub.   No murmur heard.  Pulmonary/Chest: Breath sounds normal. She has no wheezes. She has no rales. She exhibits no  tenderness.   Abdominal: There is no abdominal tenderness. There is no rebound and no guarding.   Musculoskeletal:         General: Tenderness and deformity present.      Right shoulder: Tenderness present.      Left shoulder: Tenderness present.      Right elbow: Normal.      Left elbow: Normal.      Right wrist: Swelling and tenderness present.      Left wrist: Swelling and tenderness present.      Cervical back: Neck supple.      Right knee: No effusion. Tenderness present.      Left knee: No effusion. Tenderness present.      Left ankle: Swelling present.   Lymphadenopathy:     She has no cervical adenopathy.   Neurological: She is alert and oriented to person, place, and time. Gait normal.   Skin: No rash noted. No erythema. No pallor.   Psychiatric: Mood and affect normal.   Nursing note and vitals reviewed.      Right Side Rheumatological Exam     Examination finds the elbow normal.    The patient is tender to palpation of the shoulder, wrist, knee, 1st PIP, 1st MCP, 2nd PIP, 2nd MCP, 3rd PIP, 3rd MCP, 4th PIP, 4th MCP, 5th PIP and 5th MCP    She has swelling of the wrist, 1st PIP, 1st MCP, 2nd PIP, 2nd MCP, 3rd PIP, 3rd MCP, 4th PIP, 4th MCP, 5th PIP and 5th MCP    The patient has an enlarged wrist    Shoulder Exam   Tenderness Location: no tenderness    Range of Motion   Active abduction:  abnormal   Adduction: abnormal  Sensation: normal    Knee Exam   Tenderness Location: lateral joint line  Patellofemoral Crepitus: positive  Effusion: negative  Sensation: normal    Hip Exam   Tenderness Location: posterior  Sensation: normal    Elbow/Wrist Exam   Tenderness Location: no tenderness  Sensation: normal    Muscle Strength (0-5 scale):  Neck Flexion:  2  Neck Extension: 2  : 2/5     Left Side Rheumatological Exam     Examination finds the elbow normal.    The patient is tender to palpation of the shoulder, wrist, knee, 1st PIP, 1st MCP, 2nd PIP, 2nd MCP, 3rd PIP, 3rd MCP, 4th PIP, 4th MCP, 5th PIP, 5th  MCP and temporomandibular.    She has swelling of the wrist, 1st PIP, 1st MCP, 2nd PIP, 2nd MCP, 3rd PIP, 3rd MCP, 4th PIP, 4th MCP, 5th PIP, 5th MCP, 1st CMC, 2nd DIP, 3rd DIP, 4th DIP, 5th DIP, knee, 1st MTP, 2nd MTP, 3rd MTP, 4th MTP, 1st toe IP, 2nd toe IP, 3rd toe IP, 4th toe IP and 5th toe IP    The patient has an enlarged wrist, 1st CMC, 2nd DIP, 3rd DIP, 4th DIP, 5th DIP, 1st toe IP, 2nd toe IP, 3rd toe IP, 4th toe IP and 5th toe IP.    Shoulder Exam   Tenderness Location: acromioclavicular joint    Range of Motion   Active abduction:  abnormal   Sensation: normal    Knee Exam     Patellofemoral Crepitus: positive  Effusion: negative  Sensation: normal    Hip Exam   Tenderness Location: posterior  Sensation: normal    Elbow/Wrist Exam   Sensation: normal    Muscle Strength (0-5 scale):  Neck Flexion:  2  Neck Extension: 2  :  1/5       Back/Neck Exam   General Inspection   Gait: normal          Disease Assessment Scores:  Patient's Global Assessment of arthritis (0-10): 5  Physician's Global Assessment of arthritis (0-10): 5  Number of Tender Joints (0-28): 5  Number of Swollen Joints (0-28): 6    There is currently no information documented on the homunculus. Go to the Rheumatology activity and [facility].          No data to display                Monitoring Lab Results:  Lab Results   Component Value Date    WBC 10.33 04/04/2024    RBC 3.47 (L) 04/04/2024    HGB 10.8 (L) 04/04/2024    HCT 35.6 (L) 04/04/2024     (H) 04/04/2024    MCH 31.1 (H) 04/04/2024    MCHC 30.3 (L) 04/04/2024    RDW 12.6 04/04/2024     04/04/2024        Lab Results   Component Value Date     04/04/2024    K 4.8 04/04/2024     04/04/2024    CO2 26 04/04/2024    GLU 88 04/04/2024    BUN 17 04/04/2024    CREATININE 0.9 04/04/2024    CALCIUM 9.1 04/04/2024    PROT 6.9 04/04/2024    ALBUMIN 3.6 04/04/2024    BILITOT 0.1 04/04/2024    ALKPHOS 146 (H) 04/04/2024    AST 19 04/04/2024     "ALT 9 (L) 04/04/2024    ANIONGAP 10 04/04/2024    EGFRNORACEVR >60.0 04/04/2024       Lab Results   Component Value Date    SEDRATE 52 (H) 04/04/2024    CRP 13.3 (H) 04/04/2024        Lab Results   Component Value Date    CHUGRMSZ18CY 15 (L) 04/04/2024        Lab Results   Component Value Date    CHOL 114 05/05/2023    HDL 46 05/05/2023    LDLCALC 56 05/05/2023    TRIG 58 05/05/2023       Lab Results   Component Value Date    RF <10.0 07/13/2015    CCPANTIBODIE <0.5 11/20/2014     Lab Results   Component Value Date    ANASCREEN Negative <1:160 04/13/2018    DSDNA Positive 1:320 11/20/2014     No results found for: "HLABB27"    Infectious Disease Screening:  Lab Results   Component Value Date    HEPBSAG Non-reactive 12/05/2023    HEPBCAB Non-reactive 12/05/2023    HEPBSAB <3.00 12/05/2023    HEPBSAB Non-reactive 12/05/2023    HEPBIGM Non-reactive 12/05/2023     Lab Results   Component Value Date    HEPCAB Non-reactive 12/05/2023     Lab Results   Component Value Date    TBGOLDPLUS Negative 12/05/2023     No results found for: "QUANTIFERON", "SVCMT", "QUANTAGVALUE", "QUANTNILVALU", "QUANTMITOGEN", "QFTTBAG", "QINT"     Imaging: DEXA, Xrays, MRIs, CTs, etc    Old & Outside Medical Records:  Reviewed old and all outside medical records available in Care Everywhere    Current Medication Changes:  Medication List with Changes/Refills   New Medications    AMLODIPINE (NORVASC) 10 MG TABLET    Take 1 tablet (10 mg total) by mouth once daily.    BUDESONIDE (PULMICORT) 0.5 MG/2 ML NEBULIZER SOLUTION    Take 2 mLs (0.5 mg total) by nebulization once daily. Controller    DOXYCYCLINE (VIBRA-TABS) 100 MG TABLET    Take 1 tablet (100 mg total) by mouth 2 (two) times daily. for 15 days    HYDROCODONE-ACETAMINOPHEN (NORCO)  MG PER TABLET    Take 1 tablet by mouth every 8 (eight) hours as needed for Pain.    HYDROCODONE-ACETAMINOPHEN (NORCO)  MG PER TABLET    Take 1 tablet by mouth every 8 (eight) hours as needed for Pain. "    HYDROXYZINE HCL (ATARAX) 25 MG TABLET    Take 1 tablet (25 mg total) by mouth 3 (three) times daily. for 15 days    IPRATROPIUM (ATROVENT) 0.02 % NEBULIZER SOLUTION    Take 2.5 mLs (500 mcg total) by nebulization 4 (four) times daily. Rescue    METOPROLOL SUCCINATE (TOPROL-XL) 25 MG 24 HR TABLET    Take 1 tablet (25 mg total) by mouth every evening.    TRIAMCINOLONE ACETONIDE 0.1% (KENALOG) 0.1 % OINTMENT    Apply topically 2 (two) times daily. for 15 days    UPADACITINIB (RINVOQ) 15 MG 24 HR TABLET    Take 1 tablet (15 mg total) by mouth once daily.   Current Medications    ACETAMINOPHEN (TYLENOL) 650 MG TBSR    Take 650 mg by mouth every 8 (eight) hours as needed.    ALBUTEROL (VENTOLIN HFA) 90 MCG/ACTUATION INHALER    Inhale 2 puffs into the lungs every 6 (six) hours as needed for Wheezing.    ALENDRONATE (FOSAMAX) 70 MG TABLET    Take 1 tablet (70 mg total) by mouth every 7 days.    ASPIRIN (ECOTRIN) 81 MG EC TABLET    Take 81 mg by mouth once daily.    CHANTIX 1 MG TAB    Take 1 tablet (1 mg total) by mouth 2 (two) times daily.    CHANTIX STARTING MONTH BOX 0.5 MG (11)- 1 MG (42) TABLET    Take one 0.5mg tab by mouth once daily X3 days,then increase to one 0.5mg tab twice daily X4 days,then increase to one 1mg tab twice daily    CYCLOBENZAPRINE (FLEXERIL) 10 MG TABLET    TAKE ONE TABLET BY MOUTH THREE TIMES DAILY AS NEEDED FOR MUSCLE SPASMS    CYPROHEPTADINE (PERIACTIN) 4 MG TABLET    TAKE ONE TABLET BY MOUTH IN THE EVENING    DICLOFENAC SODIUM (PENNSAID) 20 MG/GRAM /ACTUATION(2 %) SOPM    Apply 40 mg topically 2 (two) times daily.    DOCOSAHEXAENOIC ACID ORAL    Take 1 g by mouth.    FLUTICASONE-UMECLIDIN-VILANTER (TRELEGY ELLIPTA) 100-62.5-25 MCG DSDV    Inhale 1 puff into the lungs once daily.    GABAPENTIN (NEURONTIN) 300 MG CAPSULE    Take 2 capsules (600 mg total) by mouth 2 (two) times daily.    HYDROCODONE-ACETAMINOPHEN (NORCO)  MG PER TABLET    Take 1 tablet by mouth every 8 (eight) hours as  needed for Pain.    HYDROCODONE-ACETAMINOPHEN (NORCO)  MG PER TABLET    Take 1 tablet by mouth every 8 (eight) hours as needed for Pain.    HYDROXYCHLOROQUINE (PLAQUENIL) 200 MG TABLET    Take 1 tablet (200 mg total) by mouth once daily.    LEVOCETIRIZINE (XYZAL) 5 MG TABLET    Take 1 tablet (5 mg total) by mouth every evening.    LEVOTHYROXINE (SYNTHROID) 50 MCG TABLET    Take 1 tablet (50 mcg total) by mouth before breakfast.    LIDOCAINE-PRILOCAINE (EMLA) CREAM    APPLY TOPICALLY TO THE AFFECTED AREA(S) AS NEEDED    NICOTINE POLACRILEX (NICORETTE) 4 MG GUM    Take 1 each (4 mg total) by mouth as needed. Chew 1 piece every 1-2 hours. (Max 24 pieces/day)    OMEGA 3-DHA-EPA-FISH -1,000 MG CAP    Take 1 g by mouth every morning.    OMEPRAZOLE (PRILOSEC) 40 MG CAPSULE    Take 1 capsule (40 mg total) by mouth once daily.    PAROXETINE (PAXIL) 40 MG TABLET    Take 1 tablet (40 mg total) by mouth once daily.    PREDNISONE (DELTASONE) 2.5 MG TABLET    TAKE 4 TABLETS BY MOUTH ONCE DAILY    QUETIAPINE (SEROQUEL) 50 MG TABLET    Take 3 tablets (150 mg total) by mouth every evening.    ROSUVASTATIN (CRESTOR) 20 MG TABLET    Take 20 mg by mouth every evening.   Changed and/or Refilled Medications    Modified Medication Previous Medication    BUTALBITAL-ACETAMINOPHEN-CAFFEINE -40 MG (FIORICET, ESGIC) -40 MG PER TABLET butalbital-acetaminophen-caffeine -40 mg (FIORICET, ESGIC) -40 mg per tablet       Take 1 tablet by mouth every 6 (six) hours as needed for Headaches.    TAKE ONE TABLET BY MOUTH EVERY 6 HOURS AS NEEDED FOR PAIN    HYDROCODONE-ACETAMINOPHEN (NORCO)  MG PER TABLET HYDROcodone-acetaminophen (NORCO)  mg per tablet       Take 1 tablet by mouth every 8 (eight) hours as needed for Pain.    Take 1 tablet by mouth every 8 (eight) hours as needed for Pain.   Discontinued Medications    AMLODIPINE (NORVASC) 5 MG TABLET    Take 1 tablet (5 mg total) by mouth once daily.     LISINOPRIL (PRINIVIL,ZESTRIL) 30 MG TABLET    Take 1 tablet (30 mg total) by mouth once daily.      CT Chest Lung Screening Low Dose  Order: [id number]  Status: Final result       Visible to patient: Yes (not seen)       Next appt: None       Dx: Cigarette smoker    0 Result Notes       1  Topic  Recommendations     Due   Follow Up LDCT 1 Year 8/11/2024     Details    Reading Physician Reading Date Result Priority   Chano Santos MD  [phone] 8/12/2023 Routine     Narrative & Impression  EXAMINATION:  CT CHEST LUNG SCREENING LOW DOSE     CLINICAL HISTORY:  f17.210; Nicotine dependence, cigarettes, uncomplicated     TECHNIQUE:  Axial CT images were obtained through the chest without contrast.  Total DLP 24.  CTDIvol (mGy) 0.68.  Reconstructed image with 1 mm.  Automated exposure control utilized.     COMPARISON:  09/22/2022     FINDINGS:  There is 3 mm nodule within a subsegmental bronchus of the right upper lobe image 175 of series 6.  There is a 3 mm part solid right upper lobe pulmonary nodule image 198 of series 6.  There do appear to be filling defects likely secretions within the left mainstem bronchus and within the left lower lobe bronchi with left lower lobe consolidation most compatible with atelectasis.  A component of pneumonia would be difficult to exclude.  There are calcified granulomas noted.  There are mucous plugs noted within left upper lobe subsegmental bronchi.  There is no pneumothorax.  There is no pleural effusion.     Atherosclerotic calcifications are noted thoracic aorta and branches including coronary arteries.  Thoracic aorta is not aneurysmal.  There is no pericardial effusion.  No lymphadenopathy noted.  Included portions of the non contrasted upper abdomen demonstrate nonobstructing left renal calculus.  No acute abnormality identified.  No acute displaced fractures identified.     Impression:     Lung-RADS Category 2S-Benign Appearance or Behavior- continue annual screening  with LDCT in 12 months.     Filling defects within left mainstem bronchus and more notably within left lower lobe bronchi likely reflect secretions/mucoid impaction with a left lower lobe consolidation compatible with atelectasis.  A component of pneumonia would be difficult to exclude.  Recommend pulmonary consultation for consideration of bronchoscopy.     Coronary artery calcifications are present. Consider dedicated calcium score CT if clinically indicated.        Electronically signed by: Chano Santos MD  Date:                                            08/12/2023  Time:                                           15:41     Assessment:     Encounter Diagnoses   Name Primary?    Hypertension, unspecified type Yes    Fibromyalgia     Chronic cough     Psoriatic arthritis     Seronegative rheumatoid arthritis     Psoriasis with pustules     Anxiety and depression     COPD (chronic obstructive pulmonary disease) with chronic bronchitis     Nonintractable headache, unspecified chronicity pattern, unspecified headache type     Chronic pain syndrome     Bronchitis     Bug bite, sequela        Plan:      Encounter Diagnoses   Name Primary?    Hypertension, unspecified type Yes    Fibromyalgia     Chronic cough     Psoriatic arthritis     Seronegative rheumatoid arthritis     Psoriasis with pustules     Anxiety and depression     COPD (chronic obstructive pulmonary disease) with chronic bronchitis     Nonintractable headache, unspecified chronicity pattern, unspecified headache type     Chronic pain syndrome     Bronchitis     Bug bite, sequela      Barbara was seen today for disease management.    Diagnoses and all orders for this visit:    Hypertension, unspecified type  -     amLODIPine (NORVASC) 10 MG tablet; Take 1 tablet (10 mg total) by mouth once daily.  -     metoprolol succinate (TOPROL-XL) 25 MG 24 hr tablet; Take 1 tablet (25 mg total) by mouth every evening.  -     Ambulatory referral/consult to Internal  Medicine; Future    Fibromyalgia    Chronic cough  -     butalbital-acetaminophen-caffeine -40 mg (FIORICET, ESGIC) -40 mg per tablet; Take 1 tablet by mouth every 6 (six) hours as needed for Headaches.  -     HYDROcodone-acetaminophen (NORCO)  mg per tablet; Take 1 tablet by mouth every 8 (eight) hours as needed for Pain.  -     HYDROcodone-acetaminophen (NORCO)  mg per tablet; Take 1 tablet by mouth every 8 (eight) hours as needed for Pain.  -     HYDROcodone-acetaminophen (NORCO)  mg per tablet; Take 1 tablet by mouth every 8 (eight) hours as needed for Pain.  -     budesonide (PULMICORT) 0.5 mg/2 mL nebulizer solution; Take 2 mLs (0.5 mg total) by nebulization once daily. Controller  -     ipratropium (ATROVENT) 0.02 % nebulizer solution; Take 2.5 mLs (500 mcg total) by nebulization 4 (four) times daily. Rescue  -     doxycycline (VIBRA-TABS) 100 MG tablet; Take 1 tablet (100 mg total) by mouth 2 (two) times daily. for 15 days    Psoriatic arthritis  -     upadacitinib (RINVOQ) 15 mg 24 hr tablet; Take 1 tablet (15 mg total) by mouth once daily.  -     butalbital-acetaminophen-caffeine -40 mg (FIORICET, ESGIC) -40 mg per tablet; Take 1 tablet by mouth every 6 (six) hours as needed for Headaches.  -     HYDROcodone-acetaminophen (NORCO)  mg per tablet; Take 1 tablet by mouth every 8 (eight) hours as needed for Pain.  -     HYDROcodone-acetaminophen (NORCO)  mg per tablet; Take 1 tablet by mouth every 8 (eight) hours as needed for Pain.  -     HYDROcodone-acetaminophen (NORCO)  mg per tablet; Take 1 tablet by mouth every 8 (eight) hours as needed for Pain.    Seronegative rheumatoid arthritis  -     upadacitinib (RINVOQ) 15 mg 24 hr tablet; Take 1 tablet (15 mg total) by mouth once daily.    Psoriasis with pustules  -     butalbital-acetaminophen-caffeine -40 mg (FIORICET, ESGIC) -40 mg per tablet; Take 1 tablet by mouth every 6 (six) hours as  needed for Headaches.  -     HYDROcodone-acetaminophen (NORCO)  mg per tablet; Take 1 tablet by mouth every 8 (eight) hours as needed for Pain.  -     HYDROcodone-acetaminophen (NORCO)  mg per tablet; Take 1 tablet by mouth every 8 (eight) hours as needed for Pain.  -     HYDROcodone-acetaminophen (NORCO)  mg per tablet; Take 1 tablet by mouth every 8 (eight) hours as needed for Pain.    Anxiety and depression  -     butalbital-acetaminophen-caffeine -40 mg (FIORICET, ESGIC) -40 mg per tablet; Take 1 tablet by mouth every 6 (six) hours as needed for Headaches.    COPD (chronic obstructive pulmonary disease) with chronic bronchitis  -     butalbital-acetaminophen-caffeine -40 mg (FIORICET, ESGIC) -40 mg per tablet; Take 1 tablet by mouth every 6 (six) hours as needed for Headaches.  -     HYDROcodone-acetaminophen (NORCO)  mg per tablet; Take 1 tablet by mouth every 8 (eight) hours as needed for Pain.  -     HYDROcodone-acetaminophen (NORCO)  mg per tablet; Take 1 tablet by mouth every 8 (eight) hours as needed for Pain.  -     HYDROcodone-acetaminophen (NORCO)  mg per tablet; Take 1 tablet by mouth every 8 (eight) hours as needed for Pain.  -     budesonide (PULMICORT) 0.5 mg/2 mL nebulizer solution; Take 2 mLs (0.5 mg total) by nebulization once daily. Controller  -     ipratropium (ATROVENT) 0.02 % nebulizer solution; Take 2.5 mLs (500 mcg total) by nebulization 4 (four) times daily. Rescue  -     doxycycline (VIBRA-TABS) 100 MG tablet; Take 1 tablet (100 mg total) by mouth 2 (two) times daily. for 15 days  -     Ambulatory referral/consult to Internal Medicine; Future    Nonintractable headache, unspecified chronicity pattern, unspecified headache type  -     butalbital-acetaminophen-caffeine -40 mg (FIORICET, ESGIC) -40 mg per tablet; Take 1 tablet by mouth every 6 (six) hours as needed for Headaches.    Chronic pain syndrome  -      butalbital-acetaminophen-caffeine -40 mg (FIORICET, ESGIC) -40 mg per tablet; Take 1 tablet by mouth every 6 (six) hours as needed for Headaches.  -     HYDROcodone-acetaminophen (NORCO)  mg per tablet; Take 1 tablet by mouth every 8 (eight) hours as needed for Pain.  -     HYDROcodone-acetaminophen (NORCO)  mg per tablet; Take 1 tablet by mouth every 8 (eight) hours as needed for Pain.  -     HYDROcodone-acetaminophen (NORCO)  mg per tablet; Take 1 tablet by mouth every 8 (eight) hours as needed for Pain.  -     Ambulatory referral/consult to Internal Medicine; Future    Bronchitis  -     butalbital-acetaminophen-caffeine -40 mg (FIORICET, ESGIC) -40 mg per tablet; Take 1 tablet by mouth every 6 (six) hours as needed for Headaches.  -     HYDROcodone-acetaminophen (NORCO)  mg per tablet; Take 1 tablet by mouth every 8 (eight) hours as needed for Pain.  -     HYDROcodone-acetaminophen (NORCO)  mg per tablet; Take 1 tablet by mouth every 8 (eight) hours as needed for Pain.  -     HYDROcodone-acetaminophen (NORCO)  mg per tablet; Take 1 tablet by mouth every 8 (eight) hours as needed for Pain.  -     budesonide (PULMICORT) 0.5 mg/2 mL nebulizer solution; Take 2 mLs (0.5 mg total) by nebulization once daily. Controller  -     ipratropium (ATROVENT) 0.02 % nebulizer solution; Take 2.5 mLs (500 mcg total) by nebulization 4 (four) times daily. Rescue  -     doxycycline (VIBRA-TABS) 100 MG tablet; Take 1 tablet (100 mg total) by mouth 2 (two) times daily. for 15 days  -     Ambulatory referral/consult to Internal Medicine; Future    Bug bite, sequela  -     hydrOXYzine HCL (ATARAX) 25 MG tablet; Take 1 tablet (25 mg total) by mouth 3 (three) times daily. for 15 days  -     triamcinolone acetonide 0.1% (KENALOG) 0.1 % ointment; Apply topically 2 (two) times daily. for 15 days        Follow-up 4 months    More than 50% of the  40 minute encounter was spent face to  face counseling the patient regarding current status and future plan of care as well as side effects  of the medications. All questions were answered to patient's satisfaction also includes  non-face to face time preparing to see the patient (eg, review of tests), Obtaining and/or reviewing separately obtained history, Documenting clinical information in the electronic or other health record, Independently interpreting results

## 2024-04-16 ENCOUNTER — TELEPHONE (OUTPATIENT)
Dept: RHEUMATOLOGY | Facility: CLINIC | Age: 64
End: 2024-04-16
Payer: MEDICARE

## 2024-04-16 NOTE — TELEPHONE ENCOUNTER
The ones from Dec. 2023 suffice. Not needed again til 12/2024. Will be due for lipid panel soon. Last completed 5/5/23    Lab Results   Component Value Date    HEPBSAG Non-reactive 12/05/2023    HEPBCAB Non-reactive 12/05/2023    HEPBSAB <3.00 12/05/2023    HEPBSAB Non-reactive 12/05/2023    HEPBIGM Non-reactive 12/05/2023       Lab Results   Component Value Date    HEPCAB Non-reactive 12/05/2023     Lab Results   Component Value Date    TBGOLDPLUS Negative 12/05/2023

## 2024-04-16 NOTE — TELEPHONE ENCOUNTER
----- Message from Gopi Ventura PharmD sent at 4/16/2024 12:02 PM CDT -----  Regarding: TB/ Hep B Labs  Good afternoon,    [facility] is working on the Rinvoq PA for the patient. However, I see her last TB/Hep B test was done in December 2023. Does she need to complete updated tests prior to starting Rinvoq therapy or will the ones from December be suffice?    Thank you,    Gopi Ventura, Laura  Clinical Pharmacist   [facility]   P: [phone]

## 2024-04-18 ENCOUNTER — TELEPHONE (OUTPATIENT)
Dept: RHEUMATOLOGY | Facility: CLINIC | Age: 64
End: 2024-04-18
Payer: MEDICARE

## 2024-04-18 NOTE — TELEPHONE ENCOUNTER
----- Message from Gopi Ventura PharmD sent at 4/17/2024  3:16 PM CDT -----  Regarding: Rinvoq Denial  Good afternoon,    Unfortunately, the patient's plan has denied Rinvoq coverage. They are requiring a failed tried of at least 3 months with a TNF inhibitor. Would it be appropriate to change to a TNF inhibitor, such as Enbrel or Humira per plan step therapy? Or is there a specific reason a TNF inhibitor would not be appropriate for the patient?     Thank you,    Gopi Ventura, PharmD  Clinical Pharmacist   [facility]   P: [phone]

## 2024-04-25 DIAGNOSIS — F41.9 ANXIETY AND DEPRESSION: ICD-10-CM

## 2024-04-25 DIAGNOSIS — F32.A ANXIETY AND DEPRESSION: ICD-10-CM

## 2024-04-25 DIAGNOSIS — G47.00 INSOMNIA, UNSPECIFIED TYPE: ICD-10-CM

## 2024-04-25 DIAGNOSIS — L40.50 PSORIATIC ARTHRITIS: ICD-10-CM

## 2024-04-25 DIAGNOSIS — M06.00 SERONEGATIVE RHEUMATOID ARTHRITIS: ICD-10-CM

## 2024-04-25 RX ORDER — GABAPENTIN 300 MG/1
600 CAPSULE ORAL 2 TIMES DAILY
Qty: 360 CAPSULE | Refills: 1 | Status: SHIPPED | OUTPATIENT
Start: 2024-04-25 | End: 2025-04-20

## 2024-04-25 RX ORDER — QUETIAPINE FUMARATE 50 MG/1
150 TABLET, FILM COATED ORAL NIGHTLY
Qty: 270 TABLET | Refills: 1 | Status: SHIPPED | OUTPATIENT
Start: 2024-04-25

## 2024-04-25 RX ORDER — PAROXETINE HYDROCHLORIDE 40 MG/1
40 TABLET, FILM COATED ORAL DAILY
Qty: 90 TABLET | Refills: 1 | Status: SHIPPED | OUTPATIENT
Start: 2024-04-25

## 2024-04-25 NOTE — TELEPHONE ENCOUNTER
Patient does has uncontrolled HTN and no recent EF on file that I see. High risk for HF with the uncontrolled HTN. TNFi maybe be avoiding due to concern but didn't see. Patient is a smoker. Patient not FERNANDA +    Didn't see a reason that patient could not be on TNFi but will check with providers.

## 2024-04-25 NOTE — TELEPHONE ENCOUNTER
Pharmacy requesting refill on Paroxetine 40mg, Gabapentin 300mg and Quetiapine 50mg  Pt's LOV 04/09/2024  Pt's NOV 08/15/2024  Medication pending

## 2024-04-26 NOTE — TELEPHONE ENCOUNTER
I believe Humira is ok to move forward with.      STAFF: please call pt to see if she is willing to give humira injection every 14 days. Insurance denied Rinvoq.

## 2024-05-06 DIAGNOSIS — L40.50 PSORIATIC ARTHRITIS: ICD-10-CM

## 2024-05-06 DIAGNOSIS — J30.2 SEASONAL ALLERGIES: ICD-10-CM

## 2024-05-06 RX ORDER — PREDNISONE 2.5 MG/1
TABLET ORAL
Qty: 120 TABLET | Refills: 3 | Status: SHIPPED | OUTPATIENT
Start: 2024-05-06

## 2024-05-06 RX ORDER — LEVOCETIRIZINE DIHYDROCHLORIDE 5 MG/1
5 TABLET, FILM COATED ORAL NIGHTLY
Qty: 30 TABLET | Refills: 5 | Status: SHIPPED | OUTPATIENT
Start: 2024-05-06 | End: 2025-05-06

## 2024-05-06 NOTE — TELEPHONE ENCOUNTER
Pharmacy requesting refill on Levocetirizine 5mg and Prednisone 2.5mg  Pt's LOV 04/09/2024  Pt's NOV 08/15/2024  Medication pending

## 2024-05-11 DIAGNOSIS — L40.50 PSORIATIC ARTHRITIS: ICD-10-CM

## 2024-05-11 DIAGNOSIS — M06.00 SERONEGATIVE RHEUMATOID ARTHRITIS: ICD-10-CM

## 2024-05-11 DIAGNOSIS — G89.4 CHRONIC PAIN SYNDROME: ICD-10-CM

## 2024-05-13 RX ORDER — LIDOCAINE AND PRILOCAINE 25; 25 MG/G; MG/G
CREAM TOPICAL
Qty: 30 G | Refills: 3 | Status: SHIPPED | OUTPATIENT
Start: 2024-05-13

## 2024-06-05 DIAGNOSIS — G89.4 CHRONIC PAIN SYNDROME: ICD-10-CM

## 2024-06-05 DIAGNOSIS — L40.50 PSORIATIC ARTHRITIS: ICD-10-CM

## 2024-06-05 DIAGNOSIS — M06.00 SERONEGATIVE RHEUMATOID ARTHRITIS: ICD-10-CM

## 2024-06-08 RX ORDER — DICLOFENAC SODIUM 20 MG/G
SOLUTION TOPICAL
Qty: 112 G | Refills: 3 | Status: SHIPPED | OUTPATIENT
Start: 2024-06-08

## 2024-06-26 DIAGNOSIS — G89.4 CHRONIC PAIN SYNDROME: ICD-10-CM

## 2024-06-26 DIAGNOSIS — L40.50 PSORIATIC ARTHRITIS: ICD-10-CM

## 2024-06-26 DIAGNOSIS — M06.00 SERONEGATIVE RHEUMATOID ARTHRITIS: ICD-10-CM

## 2024-06-26 RX ORDER — CYCLOBENZAPRINE HCL 10 MG
TABLET ORAL
Qty: 270 TABLET | Refills: 1 | Status: SHIPPED | OUTPATIENT
Start: 2024-06-26

## 2024-06-26 NOTE — TELEPHONE ENCOUNTER
Pharmacy requesting refill on Cyclobenzaprine 10mg  Pt's LOV 04/09/2024  Pt's NOV 08/15/2024  Medication pending

## 2024-07-23 DIAGNOSIS — J40 BRONCHITIS: ICD-10-CM

## 2024-07-23 DIAGNOSIS — L40.8 PSORIASIS WITH PUSTULES: ICD-10-CM

## 2024-07-23 DIAGNOSIS — J44.89 COPD (CHRONIC OBSTRUCTIVE PULMONARY DISEASE) WITH CHRONIC BRONCHITIS: ICD-10-CM

## 2024-07-23 DIAGNOSIS — G89.4 CHRONIC PAIN SYNDROME: ICD-10-CM

## 2024-07-23 DIAGNOSIS — R05.3 CHRONIC COUGH: ICD-10-CM

## 2024-07-23 DIAGNOSIS — L40.50 PSORIATIC ARTHRITIS: ICD-10-CM

## 2024-07-24 RX ORDER — HYDROCODONE BITARTRATE AND ACETAMINOPHEN 10; 325 MG/1; MG/1
1 TABLET ORAL EVERY 8 HOURS PRN
Qty: 90 TABLET | Refills: 0 | Status: SHIPPED | OUTPATIENT
Start: 2024-07-24

## 2024-07-29 DIAGNOSIS — E03.9 HYPOTHYROIDISM, UNSPECIFIED TYPE: ICD-10-CM

## 2024-08-01 DIAGNOSIS — F32.A ANXIETY AND DEPRESSION: ICD-10-CM

## 2024-08-01 DIAGNOSIS — R51.9 NONINTRACTABLE HEADACHE, UNSPECIFIED CHRONICITY PATTERN, UNSPECIFIED HEADACHE TYPE: ICD-10-CM

## 2024-08-01 DIAGNOSIS — L40.8 PSORIASIS WITH PUSTULES: ICD-10-CM

## 2024-08-01 DIAGNOSIS — L40.50 PSORIATIC ARTHRITIS: ICD-10-CM

## 2024-08-01 DIAGNOSIS — F41.9 ANXIETY AND DEPRESSION: ICD-10-CM

## 2024-08-01 DIAGNOSIS — J44.89 COPD (CHRONIC OBSTRUCTIVE PULMONARY DISEASE) WITH CHRONIC BRONCHITIS: ICD-10-CM

## 2024-08-01 DIAGNOSIS — R05.3 CHRONIC COUGH: ICD-10-CM

## 2024-08-01 RX ORDER — CYPROHEPTADINE HYDROCHLORIDE 4 MG/1
4 TABLET ORAL NIGHTLY
Qty: 30 TABLET | Refills: 3 | Status: SHIPPED | OUTPATIENT
Start: 2024-08-01

## 2024-08-06 RX ORDER — LEVOTHYROXINE SODIUM 50 UG/1
50 TABLET ORAL
Qty: 90 TABLET | Refills: 1 | Status: SHIPPED | OUTPATIENT
Start: 2024-08-06 | End: 2025-08-06

## 2024-08-14 ENCOUNTER — TELEPHONE (OUTPATIENT)
Dept: RHEUMATOLOGY | Facility: CLINIC | Age: 64
End: 2024-08-14
Payer: MEDICARE

## 2024-08-14 NOTE — TELEPHONE ENCOUNTER
----- Message from Jorge Burns sent at 8/14/2024  2:33 PM CDT -----  Regarding: Appointment  Name of Who is Calling:  Patient          What is the request in detail:  Please contact patient she has questions about her 10:15 appointment            Can the clinic reply by [facility]: No            What [facility] to [facility] Back [facility]: [phone]

## 2024-08-15 ENCOUNTER — TELEPHONE (OUTPATIENT)
Dept: RHEUMATOLOGY | Facility: CLINIC | Age: 64
End: 2024-08-15
Payer: MEDICARE

## 2024-08-15 ENCOUNTER — OFFICE VISIT (OUTPATIENT)
Dept: RHEUMATOLOGY | Facility: CLINIC | Age: 64
End: 2024-08-15
Payer: MEDICARE

## 2024-08-15 VITALS
HEART RATE: 89 BPM | BODY MASS INDEX: 21.6 KG/M2 | HEIGHT: 60 IN | SYSTOLIC BLOOD PRESSURE: 129 MMHG | DIASTOLIC BLOOD PRESSURE: 79 MMHG | WEIGHT: 110 LBS

## 2024-08-15 DIAGNOSIS — R91.8 OTHER NONSPECIFIC ABNORMAL FINDING OF LUNG FIELD: ICD-10-CM

## 2024-08-15 DIAGNOSIS — G89.4 CHRONIC PAIN SYNDROME: ICD-10-CM

## 2024-08-15 DIAGNOSIS — J98.8 RESPIRATORY INFECTION: ICD-10-CM

## 2024-08-15 DIAGNOSIS — L40.8 PSORIASIS WITH PUSTULES: ICD-10-CM

## 2024-08-15 DIAGNOSIS — Z87.891 PERSONAL HISTORY OF NICOTINE DEPENDENCE: ICD-10-CM

## 2024-08-15 DIAGNOSIS — J44.89 COPD (CHRONIC OBSTRUCTIVE PULMONARY DISEASE) WITH CHRONIC BRONCHITIS: ICD-10-CM

## 2024-08-15 DIAGNOSIS — D64.9 ANEMIA, UNSPECIFIED TYPE: ICD-10-CM

## 2024-08-15 DIAGNOSIS — L40.50 PSORIATIC ARTHRITIS: Primary | ICD-10-CM

## 2024-08-15 PROCEDURE — 96372 THER/PROPH/DIAG INJ SC/IM: CPT | Mod: PBBFAC,PO

## 2024-08-15 PROCEDURE — 99215 OFFICE O/P EST HI 40 MIN: CPT | Mod: PBBFAC,PO,25 | Performed by: PHYSICIAN ASSISTANT

## 2024-08-15 PROCEDURE — 99999PBSHW PR PBB SHADOW TECHNICAL ONLY FILED TO HB: Mod: PBBFAC,,,

## 2024-08-15 PROCEDURE — 99215 OFFICE O/P EST HI 40 MIN: CPT | Mod: 25,S$PBB,, | Performed by: PHYSICIAN ASSISTANT

## 2024-08-15 PROCEDURE — 99999 PR PBB SHADOW E&M-EST. PATIENT-LVL V: CPT | Mod: PBBFAC,,, | Performed by: PHYSICIAN ASSISTANT

## 2024-08-15 RX ORDER — ATORVASTATIN CALCIUM 20 MG/1
20 TABLET, FILM COATED ORAL DAILY
COMMUNITY
Start: 2024-07-25

## 2024-08-15 RX ORDER — CYANOCOBALAMIN 1000 UG/ML
1000 INJECTION, SOLUTION INTRAMUSCULAR; SUBCUTANEOUS
Status: COMPLETED | OUTPATIENT
Start: 2024-08-15 | End: 2024-08-15

## 2024-08-15 RX ORDER — APREMILAST 30 MG/1
30 TABLET, FILM COATED ORAL 2 TIMES DAILY
Qty: 60 TABLET | Refills: 11 | Status: ACTIVE | OUTPATIENT
Start: 2024-08-15

## 2024-08-15 RX ORDER — DOXYCYCLINE 100 MG/1
100 CAPSULE ORAL EVERY 12 HOURS
Qty: 30 CAPSULE | Refills: 0 | Status: SHIPPED | OUTPATIENT
Start: 2024-08-15 | End: 2024-08-30

## 2024-08-15 RX ORDER — DEXAMETHASONE SODIUM PHOSPHATE 4 MG/ML
4 INJECTION, SOLUTION INTRA-ARTICULAR; INTRALESIONAL; INTRAMUSCULAR; INTRAVENOUS; SOFT TISSUE
Status: COMPLETED | OUTPATIENT
Start: 2024-08-15 | End: 2024-08-15

## 2024-08-15 RX ORDER — KETOROLAC TROMETHAMINE 30 MG/ML
60 INJECTION, SOLUTION INTRAMUSCULAR; INTRAVENOUS
Status: COMPLETED | OUTPATIENT
Start: 2024-08-15 | End: 2024-08-15

## 2024-08-15 RX ORDER — PROMETHAZINE HYDROCHLORIDE AND DEXTROMETHORPHAN HYDROBROMIDE 6.25; 15 MG/5ML; MG/5ML
5 SYRUP ORAL EVERY 6 HOURS PRN
Qty: 200 ML | Refills: 0 | Status: SHIPPED | OUTPATIENT
Start: 2024-08-15 | End: 2024-08-25

## 2024-08-15 RX ADMIN — KETOROLAC TROMETHAMINE 60 MG: 60 INJECTION, SOLUTION INTRAMUSCULAR at 03:08

## 2024-08-15 RX ADMIN — DEXAMETHASONE SODIUM PHOSPHATE 4 MG: 4 INJECTION, SOLUTION INTRA-ARTICULAR; INTRALESIONAL; INTRAMUSCULAR; INTRAVENOUS; SOFT TISSUE at 03:08

## 2024-08-15 RX ADMIN — CYANOCOBALAMIN 1000 MCG: 1000 INJECTION, SOLUTION INTRAMUSCULAR at 03:08

## 2024-08-15 ASSESSMENT — ROUTINE ASSESSMENT OF PATIENT INDEX DATA (RAPID3)
TOTAL RAPID3 SCORE: 5.83
MDHAQ FUNCTION SCORE: 1.2
PATIENT GLOBAL ASSESSMENT SCORE: 6.5
FATIGUE SCORE: 1.1
PAIN SCORE: 7
PSYCHOLOGICAL DISTRESS SCORE: 4.4

## 2024-08-15 NOTE — PROGRESS NOTES
Subjective:       Patient ID: Barbara Mims is a 64 y.o. female.    Chief Complaint: Disease Management    Mrs. Mims is a 64 year old female who presents to clinic for follow up on psoriatic arthritis, seronegative RA, and pain management. She is doing poorly--reports increased pain and swelling in her PIP and DIP joints. She has destructive changes in her fingernails. She reports worsening psoriasis. She complains of increased pain in her feet (MTPs) and bottom of her first when she first puts weight on her feet in the morning. She has been compliant with plaquenil and prednisone 7.5 mg PRN for flares. Rinvoq was denied without failure of TNF. She has needle phobia and is not interested in SC treatment.     She has chronic neck pain s/p cervical surgery and is no longer followed by pain management after multiple unsuccessful interventional treatments. She is on norco and fioricet PRN for severe pain.     We reviewed her recent DEXA consistent with osteoporosis and she is on fosamax.     She is due for f/u low dose CT.     She complains of fever, cough, sore throat, and hoarseness x 7 days.     Current treatment:  1. Norco 10/325 TID PRN for pain  2. OTC ibuprofen 400 mg daily  3. Prednisone 5 mg daily  4. plaquenil    Review of Systems   Constitutional: Positive for activity change and fever. Negative for appetite change, chills, fatigue   Eyes: Negative for visual disturbance.   Respiratory: Positive for shortness of breath, cough  Cardiovascular: Negative for chest pain, palpitations and leg swelling.  Gastrointestinal: Negative for abdominal pain.   GYN: Positive for dysuria  Musculoskeletal: Positive for arthralgias, joint swelling, neck pain and neck stiffness.   Neurological: Negative for dizziness, weakness, light-headedness and headaches.   Psychiatric/Behavioral: Negative for dysphoric mood. The patient is not nervous/anxious.          Objective:     Vitals:    08/15/24 1417   BP: 129/79   Pulse:  89         Past Medical History:   Diagnosis Date    Alopecia     Anemia     Anxiety     Arthritis     Cardiac angina syndrome     Chronic kidney disease     kidney stones    Degenerative disc disease     Depression     Dry eyes     Dry mouth     Hyperlipidemia     Hypertension     Kidney stones     Mitral valve prolapse     Thyroid disease      Past Surgical History:   Procedure Laterality Date    CARDIAC CATHETERIZATION      CERVICAL DISC SURGERY      HYSTERECTOMY      LITHOTRIPSY            Physical Exam   Constitutional: She is well-developed, well-nourished, and in no distress.   Eyes: Right conjunctiva is not injected. Left conjunctiva is not injected.   Neck: No JVD present. Muscular tenderness present. No spinous process tenderness present. Decreased range of motion present. No thyromegaly present.   Cardiovascular: Normal rate and regular rhythm.  Exam reveals no decreased pulses.    Pulmonary/Chest: normal effort      Right Side Rheumatological Exam     Examination finds the shoulder, elbow, wrist, knee, 4th MCP and 5th MCP normal.    The patient is tender to palpation of the 1st PIP, 1st MCP, 2nd PIP, 2nd MCP, 3rd PIP, 3rd MCP, 4th PIP and 5th PIP    The patient has an enlarged and swollen 1st PIP, 2nd PIP, 3rd PIP, 4th PIP, and 5th PIP, 1-5th DIPs    Left Side Rheumatological Exam     Examination finds the shoulder, elbow, wrist, knee, 1st MCP, 2nd MCP, 3rd MCP, 4th MCP and 5th MCP normal.    The patient is tender to palpation of the 1st PIP, 2nd PIP, 3rd PIP, 4th PIP and 5th PIP.    The patient has an enlarged and swollen 1st PIP, 2nd PIP, 3rd PIP, 4th PIP, and 5th PIP, 1-5th DIPs    Neurological: Gait normal.   Skin:   +psoriatic nail changes   Psychiatric: Mood and affect normal.       Labs:  Component      Latest Ref [facility] 4/4/2024   WBC      3.90 - 12.70 K/uL 10.33    RBC      4.00 - 5.40 M/uL 3.47 (L)    Hemoglobin      12.0 - 16.0 g/dL 10.8 (L)    Hematocrit      37.0 - 48.5 % 35.6 (L)    MCV       82 - 98 fL 103 (H)    MCH      27.0 - 31.0 pg 31.1 (H)    MCHC      32.0 - 36.0 g/dL 30.3 (L)    RDW      11.5 - 14.5 % 12.6    Platelet Count      150 - 450 K/uL 425    MPV      9.2 - 12.9 fL 9.3    Immature Granulocytes      0.0 - 0.5 % 0.3    Gran # (ANC)      1.8 - 7.7 K/uL 5.8    Immature Grans (Abs)      0.00 - 0.04 K/uL 0.03    Lymph #      1.0 - 4.8 K/uL 3.2    Mono #      0.3 - 1.0 K/uL 0.7    Eos #      0.0 - 0.5 K/uL 0.5    Baso #      0.00 - 0.20 K/uL 0.15    nRBC      0 /100 WBC 0    Gran %      38.0 - 73.0 % 55.6    Lymph %      18.0 - 48.0 % 30.9    Mono %      4.0 - 15.0 % 6.5    Eos %      0.0 - 8.0 % 5.2    Basophil %      0.0 - 1.9 % 1.5    Differential Method Automated    Sodium      136 - 145 mmol/L 141    Potassium      3.5 - 5.1 mmol/L 4.8    Chloride      95 - 110 mmol/L 105    CO2      23 - 29 mmol/L 26    Glucose      70 - 110 mg/dL 88    BUN      8 - 23 mg/dL 17    Creatinine      0.5 - 1.4 mg/dL 0.9    Calcium      8.7 - 10.5 mg/dL 9.1    PROTEIN TOTAL      6.0 - 8.4 g/dL 6.9    Albumin      3.5 - 5.2 g/dL 3.6    BILIRUBIN TOTAL      0.1 - 1.0 mg/dL 0.1    ALP      55 - 135 U/L 146 (H)    AST      10 - 40 U/L 19    ALT      10 - 44 U/L 9 (L)    eGFR      >60 mL/min/1.73 m^2 >60.0    Anion Gap      8 - 16 mmol/L 10    CRP      0.0 - 8.2 mg/L 13.3 (H)    Sed Rate      0 - 20 mm/Hr 52 (H)    Vitamin D      30 - 96 ng/mL 15 (L)         Imaging:  Narrative & Impression  EXAMINATION:  DXA BONE DENSITY AXIAL SKELETON 1 OR MORE SITES     CLINICAL HISTORY:  Arthropathic psoriasis, unspecified     TECHNIQUE:  DXA scanning was performed over the left hip and lumbar spine.  Review of the images confirms satisfactory positioning and technique.     COMPARISON:  None     FINDINGS:  The L1 to L4 vertebral bone mineral density is equal to 0.696 g/cm squared with a T score of -3.2.     The left femoral neck bone mineral density is equal to 0.62 g/cm squared with a T score of -2.1.     Impression:      Osteoporosis       Narrative & Impression  EXAMINATION:  CT CHEST LUNG SCREENING LOW DOSE     CLINICAL HISTORY:  f17.210; Nicotine dependence, cigarettes, uncomplicated     TECHNIQUE:  Axial CT images were obtained through the chest without contrast.  Total DLP 24.  CTDIvol (mGy) 0.68.  Reconstructed image with 1 mm.  Automated exposure control utilized.     COMPARISON:  09/22/2022     FINDINGS:  There is 3 mm nodule within a subsegmental bronchus of the right upper lobe image 175 of series 6.  There is a 3 mm part solid right upper lobe pulmonary nodule image 198 of series 6.  There do appear to be filling defects likely secretions within the left mainstem bronchus and within the left lower lobe bronchi with left lower lobe consolidation most compatible with atelectasis.  A component of pneumonia would be difficult to exclude.  There are calcified granulomas noted.  There are mucous plugs noted within left upper lobe subsegmental bronchi.  There is no pneumothorax.  There is no pleural effusion.     Atherosclerotic calcifications are noted thoracic aorta and branches including coronary arteries.  Thoracic aorta is not aneurysmal.  There is no pericardial effusion.  No lymphadenopathy noted.  Included portions of the non contrasted upper abdomen demonstrate nonobstructing left renal calculus.  No acute abnormality identified.  No acute displaced fractures identified.     Impression:     Lung-RADS Category 2S-Benign Appearance or Behavior- continue annual screening with LDCT in 12 months.     Filling defects within left mainstem bronchus and more notably within left lower lobe bronchi likely reflect secretions/mucoid impaction with a left lower lobe consolidation compatible with atelectasis.  A component of pneumonia would be difficult to exclude.  Recommend pulmonary consultation for consideration of bronchoscopy.     Coronary artery calcifications are present. Consider dedicated calcium score CT if clinically  indicated  Assessment:       1. Psoriatic arthritis    2. Psoriasis with pustules    3. COPD (chronic obstructive pulmonary disease) with chronic bronchitis    4. Respiratory infection    5. Chronic pain syndrome    6. Anemia, unspecified type    7. Other nonspecific abnormal finding of lung field    8. Personal history of nicotine dependence          Plan:       Psoriatic arthritis  -     apremilast (OTEZLA) 30 mg Tab; Take 1 tablet (30 mg total) by mouth 2 (two) times a day.  Dispense: 60 tablet; Refill: 11  -     apremilast (OTEZLA STARTER) 10 mg (4)-20 mg (4)-30 mg (47) DsPk; Day 1: 10 mg in AM, Day 2: 10 mg BID (AM and PM), Day 3: 10 mg in AM and 20 mg in PM, Day 4: 20 mg BID (AM and PM), Day 5: 20 mg in AM and 30 mg in PM, Day 6: start maintenance dosing of 30 mg BID (AM and PM).  Dispense: 55 tablet; Refill: 0  -     ketorolac injection 60 mg  -     dexAMETHasone injection 4 mg  -     cyanocobalamin injection 1,000 mcg  -     CBC Auto Differential; Future; Expected date: 08/15/2024  -     Comprehensive Metabolic Panel; Future; Expected date: 08/15/2024  -     C-Reactive Protein; Future; Expected date: 08/15/2024  -     Sedimentation rate; Future; Expected date: 08/15/2024    Psoriasis with pustules    COPD (chronic obstructive pulmonary disease) with chronic bronchitis  -     NEBULIZER FOR HOME USE  -     nebulizer accessories Kit; 1 each by Misc.(Non-Drug; Combo Route) route once daily.  Dispense: 1 kit; Refill: 0  -     doxycycline (VIBRAMYCIN) 100 MG Cap; Take 1 capsule (100 mg total) by mouth every 12 (twelve) hours. for 15 days  Dispense: 30 capsule; Refill: 0  -     promethazine-dextromethorphan (PROMETHAZINE-DM) 6.25-15 mg/5 mL Syrp; Take 5 mLs by mouth every 6 (six) hours as needed (cough).  Dispense: 200 mL; Refill: 0    Respiratory infection  -     doxycycline (VIBRAMYCIN) 100 MG Cap; Take 1 capsule (100 mg total) by mouth every 12 (twelve) hours. for 15 days  Dispense: 30 capsule; Refill: 0  -      promethazine-dextromethorphan (PROMETHAZINE-DM) 6.25-15 mg/5 mL Syrp; Take 5 mLs by mouth every 6 (six) hours as needed (cough).  Dispense: 200 mL; Refill: 0    Chronic pain syndrome    Anemia, unspecified type  -     Iron and TIBC; Future; Expected date: 08/15/2024  -     Ferritin; Future; Expected date: 08/15/2024    Other nonspecific abnormal finding of lung field  -     CT Chest Lung Screening Low Dose; Future; Expected date: 08/15/2024    Personal history of nicotine dependence  -     CT Chest Lung Screening Low Dose; Future; Expected date: 08/15/2024            Psoriatic arthritis, psoriasis, seronegative RA, fibromyalgia   --chronic neck pain s/p cervical surgery, failed pain management interventions  --chronic pain syndrome on norco  --chronic insomnia on seroquel  --fibromyalgia  --vitamin D deficiency  --macrocytosis   --osteoporosis    Plan:  1. Toradol 60, dexa 4, b12 today  2. Doxcycyline, prometh dm  3. CT chest due for f/u   4. Add otezla, pt tried this in the past and had leg cramps, she is willing to retry given her limitations with medications due to needle phobia, comorbid conditions, and frequent infections  5. Cont plaquenil 200 mg daily, prednisone 5-10 mg daily prn  6. Cont flexeril  7. Cont seroquel  8. Cont norco per MD.  I have checked [place] prescription monitoring program site and no unusual or abnormal behavior has occurred pt understand the risk and benefits of taking opioid medications and has decided to continue the medication.  9. Cont fosamax once weekly  10. Nebulizer ordered      Follow up:   3-4 mo Dr. Rodriguez

## 2024-08-15 NOTE — Clinical Note
Needs 4 mo f/u dr vaughan with labs and CT chest 1 week prior at [facility] please!

## 2024-08-15 NOTE — TELEPHONE ENCOUNTER
----- Message from Aisha Fowler sent at 8/15/2024 10:35 AM CDT -----  Regarding: Returning call  Type:  Patient Returning Call    Who Called:  Pt  Who Left Message for Patient:  Radha  Does the patient know what this is regarding?:  yes  Best Call Back Number:  [phone]    Additional Information:

## 2024-08-23 DIAGNOSIS — R05.3 CHRONIC COUGH: ICD-10-CM

## 2024-08-23 DIAGNOSIS — L40.8 PSORIASIS WITH PUSTULES: ICD-10-CM

## 2024-08-23 DIAGNOSIS — J44.89 COPD (CHRONIC OBSTRUCTIVE PULMONARY DISEASE) WITH CHRONIC BRONCHITIS: ICD-10-CM

## 2024-08-23 DIAGNOSIS — L40.50 PSORIATIC ARTHRITIS: ICD-10-CM

## 2024-08-23 DIAGNOSIS — G89.4 CHRONIC PAIN SYNDROME: ICD-10-CM

## 2024-08-23 DIAGNOSIS — J40 BRONCHITIS: ICD-10-CM

## 2024-08-24 DIAGNOSIS — J40 BRONCHITIS: ICD-10-CM

## 2024-08-24 DIAGNOSIS — J44.89 COPD (CHRONIC OBSTRUCTIVE PULMONARY DISEASE) WITH CHRONIC BRONCHITIS: ICD-10-CM

## 2024-08-24 DIAGNOSIS — R05.3 CHRONIC COUGH: ICD-10-CM

## 2024-08-24 DIAGNOSIS — L40.50 PSORIATIC ARTHRITIS: ICD-10-CM

## 2024-08-24 DIAGNOSIS — L40.8 PSORIASIS WITH PUSTULES: ICD-10-CM

## 2024-08-24 DIAGNOSIS — G89.4 CHRONIC PAIN SYNDROME: ICD-10-CM

## 2024-08-26 ENCOUNTER — TELEPHONE (OUTPATIENT)
Dept: RHEUMATOLOGY | Facility: CLINIC | Age: 64
End: 2024-08-26
Payer: MEDICARE

## 2024-08-26 NOTE — TELEPHONE ENCOUNTER
----- Message from Urban Mccann sent at 8/26/2024  9:59 AM CDT -----  Regarding: Refill request  Type:  RX Refill Request    Who Called: Pt  Refill or New Rx:refill    RX Name and Strength:HYDROcodone-acetaminophen (NORCO)  mg per tablet   How is the patient currently taking it? (ex. 1XDay):as directed  Is this a 30 day or 90 day RX:30    Preferred Pharmacy with phone number:  [facility] [address], [place] [address]  [address] [facility] [address], [address] [address]  Phone: [phone] Fax: [phone]    Local or Mail Order:local  Ordering Provider:Deidra    Would the patient rather a call back or a response via [facility]? Call back    Best Call Back Number:[phone]      Additional Information: Sts that on the 15th she had her appt and was told her refills were going to be sent and she is now out.     Please advise -- Thank you

## 2024-08-27 ENCOUNTER — TELEPHONE (OUTPATIENT)
Dept: RHEUMATOLOGY | Facility: CLINIC | Age: 64
End: 2024-08-27
Payer: MEDICARE

## 2024-08-27 RX ORDER — HYDROCODONE BITARTRATE AND ACETAMINOPHEN 10; 325 MG/1; MG/1
1 TABLET ORAL EVERY 8 HOURS PRN
Qty: 90 TABLET | Refills: 0 | OUTPATIENT
Start: 2024-08-27

## 2024-08-27 RX ORDER — HYDROCODONE BITARTRATE AND ACETAMINOPHEN 10; 325 MG/1; MG/1
1 TABLET ORAL EVERY 8 HOURS PRN
Qty: 90 TABLET | Refills: 0 | Status: SHIPPED | OUTPATIENT
Start: 2024-08-27

## 2024-08-27 NOTE — TELEPHONE ENCOUNTER
----- Message from Katelyn Cortes sent at 8/27/2024 11:14 AM CDT -----  Regarding: advise  Contact: pt  Type: Needs Medical Advice  Who Called:  patient   Symptoms (please be specific):    How long has patient had these symptoms:    Pharmacy name and phone #:    [facility] [address] [facility] [address], [address]  [address] [facility] [address]  [facility] [address]  Phone: [phone] Fax: [phone]      Best Call Back Number: [phone]    Additional Information: pt is still waiting on HYDROcodone-acetaminophen (NORCO)  mg per tablet to be sent to pharmacy call to advise thanks

## 2024-08-29 DIAGNOSIS — L40.50 PSORIATIC ARTHRITIS: ICD-10-CM

## 2024-08-29 RX ORDER — PREDNISONE 2.5 MG/1
TABLET ORAL
Qty: 120 TABLET | Refills: 3 | Status: SHIPPED | OUTPATIENT
Start: 2024-08-29

## 2024-08-29 NOTE — TELEPHONE ENCOUNTER
Pharmacy requesting refill on Prednisone 2.5mg  Pt's LOV 08/15/2024  Pt's NOV 12/18/2024  Medication pending

## 2024-09-24 DIAGNOSIS — L40.50 PSORIATIC ARTHRITIS: ICD-10-CM

## 2024-09-24 DIAGNOSIS — L40.8 PSORIASIS WITH PUSTULES: ICD-10-CM

## 2024-09-24 DIAGNOSIS — J44.89 COPD (CHRONIC OBSTRUCTIVE PULMONARY DISEASE) WITH CHRONIC BRONCHITIS: ICD-10-CM

## 2024-09-24 DIAGNOSIS — R05.3 CHRONIC COUGH: ICD-10-CM

## 2024-09-24 DIAGNOSIS — J40 BRONCHITIS: ICD-10-CM

## 2024-09-24 DIAGNOSIS — G89.4 CHRONIC PAIN SYNDROME: ICD-10-CM

## 2024-09-24 NOTE — TELEPHONE ENCOUNTER
----- Message from Shelley Mancini sent at 9/24/2024  9:36 AM CDT -----  Contact: pt  Type:  RX Refill Request    Who Called: pt    Refill or New Rx: refill    RX Name and Strength: HYDROcodone-acetaminophen (NORCO)  mg per tablet    How is the patient currently taking it? (ex. 1XDay):as directed    Is this a 30 day or 90 day RX: 30    Preferred Pharmacy with phone number:     [facility] [id number] [place] [address] [address], [place] [address]  [address] [facility] [address], [address] [address]  Phone: [phone] Fax: [phone]    Local or Mail Order:local    Ordering Provider: Jennifer    Would the patient rather a call back or a response via [facility]?  Call after sending    Best Call Back Number: [phone]    Additional Information: please refill script    Thanks

## 2024-09-25 RX ORDER — HYDROCODONE BITARTRATE AND ACETAMINOPHEN 10; 325 MG/1; MG/1
1 TABLET ORAL EVERY 8 HOURS PRN
Qty: 90 TABLET | Refills: 0 | Status: SHIPPED | OUTPATIENT
Start: 2024-09-25

## 2024-10-04 DIAGNOSIS — M06.00 SERONEGATIVE RHEUMATOID ARTHRITIS: ICD-10-CM

## 2024-10-04 DIAGNOSIS — L40.50 PSORIATIC ARTHRITIS: ICD-10-CM

## 2024-10-04 NOTE — TELEPHONE ENCOUNTER
Pharmacy requesting refill on Gabapentin 300mg  Pt's LOV 08/15/2024  Pt's NOV 12/18/2024  Medication pending

## 2024-10-06 RX ORDER — GABAPENTIN 300 MG/1
600 CAPSULE ORAL 2 TIMES DAILY
Qty: 360 CAPSULE | Refills: 1 | Status: SHIPPED | OUTPATIENT
Start: 2024-10-06 | End: 2025-10-01

## 2024-10-22 DIAGNOSIS — L40.50 PSORIATIC ARTHRITIS: ICD-10-CM

## 2024-10-22 DIAGNOSIS — J44.89 COPD (CHRONIC OBSTRUCTIVE PULMONARY DISEASE) WITH CHRONIC BRONCHITIS: ICD-10-CM

## 2024-10-22 DIAGNOSIS — G89.4 CHRONIC PAIN SYNDROME: ICD-10-CM

## 2024-10-22 DIAGNOSIS — L40.8 PSORIASIS WITH PUSTULES: ICD-10-CM

## 2024-10-22 DIAGNOSIS — J40 BRONCHITIS: ICD-10-CM

## 2024-10-22 DIAGNOSIS — R05.3 CHRONIC COUGH: ICD-10-CM

## 2024-10-22 RX ORDER — HYDROCODONE BITARTRATE AND ACETAMINOPHEN 10; 325 MG/1; MG/1
1 TABLET ORAL EVERY 8 HOURS PRN
Qty: 90 TABLET | Refills: 0 | Status: SHIPPED | OUTPATIENT
Start: 2024-10-22

## 2024-10-23 DIAGNOSIS — F32.A ANXIETY AND DEPRESSION: ICD-10-CM

## 2024-10-23 DIAGNOSIS — F41.9 ANXIETY AND DEPRESSION: ICD-10-CM

## 2024-10-23 DIAGNOSIS — G47.00 INSOMNIA, UNSPECIFIED TYPE: ICD-10-CM

## 2024-10-23 NOTE — TELEPHONE ENCOUNTER
Pharmacy requesting refill on Paroxetine 40mg and Quetiapine 50mg  Pt's LOV 08/15/2024  Pt's NOV 12/18/2024  Medication pending

## 2024-10-24 DIAGNOSIS — G89.4 CHRONIC PAIN SYNDROME: ICD-10-CM

## 2024-10-24 DIAGNOSIS — L40.50 PSORIATIC ARTHRITIS: ICD-10-CM

## 2024-10-24 DIAGNOSIS — M06.00 SERONEGATIVE RHEUMATOID ARTHRITIS: ICD-10-CM

## 2024-10-24 RX ORDER — QUETIAPINE FUMARATE 50 MG/1
150 TABLET, FILM COATED ORAL NIGHTLY
Qty: 270 TABLET | Refills: 1 | Status: SHIPPED | OUTPATIENT
Start: 2024-10-24

## 2024-10-24 RX ORDER — PAROXETINE HYDROCHLORIDE 40 MG/1
40 TABLET, FILM COATED ORAL DAILY
Qty: 90 TABLET | Refills: 1 | Status: SHIPPED | OUTPATIENT
Start: 2024-10-24

## 2024-10-24 RX ORDER — DICLOFENAC SODIUM 20 MG/G
40 SOLUTION TOPICAL 2 TIMES DAILY
Qty: 112 G | Refills: 3 | Status: SHIPPED | OUTPATIENT
Start: 2024-10-24

## 2024-11-20 DIAGNOSIS — J40 BRONCHITIS: ICD-10-CM

## 2024-11-20 DIAGNOSIS — L40.50 PSORIATIC ARTHRITIS: ICD-10-CM

## 2024-11-20 DIAGNOSIS — L40.8 PSORIASIS WITH PUSTULES: ICD-10-CM

## 2024-11-20 DIAGNOSIS — J44.89 COPD (CHRONIC OBSTRUCTIVE PULMONARY DISEASE) WITH CHRONIC BRONCHITIS: ICD-10-CM

## 2024-11-20 DIAGNOSIS — G89.4 CHRONIC PAIN SYNDROME: ICD-10-CM

## 2024-11-20 DIAGNOSIS — R05.3 CHRONIC COUGH: ICD-10-CM

## 2024-11-21 RX ORDER — HYDROCODONE BITARTRATE AND ACETAMINOPHEN 10; 325 MG/1; MG/1
1 TABLET ORAL EVERY 8 HOURS PRN
Qty: 90 TABLET | Refills: 0 | Status: SHIPPED | OUTPATIENT
Start: 2024-11-21

## 2024-11-25 DIAGNOSIS — J30.2 SEASONAL ALLERGIES: ICD-10-CM

## 2024-11-25 NOTE — TELEPHONE ENCOUNTER
Pharmacy requesting refill on Levocetirizine 5mg  Pt's LOV 08/15/2024  Pt's NOV 12/18/2024  Medication pending

## 2024-11-26 RX ORDER — LEVOCETIRIZINE DIHYDROCHLORIDE 5 MG/1
5 TABLET, FILM COATED ORAL NIGHTLY
Qty: 30 TABLET | Refills: 5 | Status: SHIPPED | OUTPATIENT
Start: 2024-11-26 | End: 2025-11-26

## 2024-12-06 ENCOUNTER — TELEPHONE (OUTPATIENT)
Dept: RHEUMATOLOGY | Facility: CLINIC | Age: 64
End: 2024-12-06
Payer: MEDICARE

## 2024-12-06 NOTE — TELEPHONE ENCOUNTER
----- Message from Kath sent at 12/6/2024 10:26 AM CST -----  Contact: self  Type: Needs Medical Advice  Who Called:  pt  Best Call Back Number: [phone]    Additional Information: pt states she been trying to get a refill on her meds for her headaches, she is trying to see what's going on .

## 2024-12-09 DIAGNOSIS — L40.50 PSORIATIC ARTHRITIS: ICD-10-CM

## 2024-12-09 DIAGNOSIS — G89.4 CHRONIC PAIN SYNDROME: ICD-10-CM

## 2024-12-09 DIAGNOSIS — R05.3 CHRONIC COUGH: ICD-10-CM

## 2024-12-09 DIAGNOSIS — J40 BRONCHITIS: ICD-10-CM

## 2024-12-09 DIAGNOSIS — R51.9 NONINTRACTABLE HEADACHE, UNSPECIFIED CHRONICITY PATTERN, UNSPECIFIED HEADACHE TYPE: ICD-10-CM

## 2024-12-09 DIAGNOSIS — F41.9 ANXIETY AND DEPRESSION: ICD-10-CM

## 2024-12-09 DIAGNOSIS — F32.A ANXIETY AND DEPRESSION: ICD-10-CM

## 2024-12-09 DIAGNOSIS — J44.89 COPD (CHRONIC OBSTRUCTIVE PULMONARY DISEASE) WITH CHRONIC BRONCHITIS: ICD-10-CM

## 2024-12-09 DIAGNOSIS — L40.8 PSORIASIS WITH PUSTULES: ICD-10-CM

## 2024-12-10 RX ORDER — BUTALBITAL, ACETAMINOPHEN AND CAFFEINE 50; 325; 40 MG/1; MG/1; MG/1
1 TABLET ORAL EVERY 6 HOURS PRN
Qty: 120 TABLET | Refills: 3 | Status: SHIPPED | OUTPATIENT
Start: 2024-12-10

## 2024-12-12 DIAGNOSIS — L40.8 PSORIASIS WITH PUSTULES: ICD-10-CM

## 2024-12-12 DIAGNOSIS — L40.50 PSORIATIC ARTHRITIS: ICD-10-CM

## 2024-12-12 DIAGNOSIS — J44.89 COPD (CHRONIC OBSTRUCTIVE PULMONARY DISEASE) WITH CHRONIC BRONCHITIS: ICD-10-CM

## 2024-12-12 DIAGNOSIS — R05.3 CHRONIC COUGH: ICD-10-CM

## 2024-12-12 DIAGNOSIS — R51.9 NONINTRACTABLE HEADACHE, UNSPECIFIED CHRONICITY PATTERN, UNSPECIFIED HEADACHE TYPE: ICD-10-CM

## 2024-12-12 DIAGNOSIS — F32.A ANXIETY AND DEPRESSION: ICD-10-CM

## 2024-12-12 DIAGNOSIS — F41.9 ANXIETY AND DEPRESSION: ICD-10-CM

## 2024-12-18 ENCOUNTER — OFFICE VISIT (OUTPATIENT)
Dept: RHEUMATOLOGY | Facility: CLINIC | Age: 64
End: 2024-12-18
Payer: MEDICARE

## 2024-12-18 VITALS
SYSTOLIC BLOOD PRESSURE: 136 MMHG | WEIGHT: 110 LBS | DIASTOLIC BLOOD PRESSURE: 94 MMHG | BODY MASS INDEX: 21.6 KG/M2 | HEART RATE: 85 BPM | HEIGHT: 60 IN | OXYGEN SATURATION: 97 %

## 2024-12-18 DIAGNOSIS — Z87.891 PERSONAL HISTORY OF NICOTINE DEPENDENCE: ICD-10-CM

## 2024-12-18 DIAGNOSIS — J44.89 COPD (CHRONIC OBSTRUCTIVE PULMONARY DISEASE) WITH CHRONIC BRONCHITIS: ICD-10-CM

## 2024-12-18 DIAGNOSIS — L40.50 PSORIATIC ARTHRITIS: ICD-10-CM

## 2024-12-18 DIAGNOSIS — R05.3 CHRONIC COUGH: ICD-10-CM

## 2024-12-18 DIAGNOSIS — M06.00 SERONEGATIVE RHEUMATOID ARTHRITIS: ICD-10-CM

## 2024-12-18 DIAGNOSIS — I10 HYPERTENSION, UNSPECIFIED TYPE: ICD-10-CM

## 2024-12-18 DIAGNOSIS — D53.9 MACROCYTIC ANEMIA: Primary | ICD-10-CM

## 2024-12-18 DIAGNOSIS — J40 BRONCHITIS: ICD-10-CM

## 2024-12-18 DIAGNOSIS — G47.00 INSOMNIA, UNSPECIFIED TYPE: ICD-10-CM

## 2024-12-18 DIAGNOSIS — G89.4 CHRONIC PAIN SYNDROME: ICD-10-CM

## 2024-12-18 PROCEDURE — 3075F SYST BP GE 130 - 139MM HG: CPT | Mod: CPTII,S$GLB,, | Performed by: INTERNAL MEDICINE

## 2024-12-18 PROCEDURE — 99214 OFFICE O/P EST MOD 30 MIN: CPT | Mod: S$GLB,,, | Performed by: INTERNAL MEDICINE

## 2024-12-18 PROCEDURE — 4010F ACE/ARB THERAPY RXD/TAKEN: CPT | Mod: CPTII,S$GLB,, | Performed by: INTERNAL MEDICINE

## 2024-12-18 PROCEDURE — 99999 PR PBB SHADOW E&M-EST. PATIENT-LVL IV: CPT | Mod: PBBFAC,,, | Performed by: INTERNAL MEDICINE

## 2024-12-18 PROCEDURE — 1159F MED LIST DOCD IN RCRD: CPT | Mod: CPTII,S$GLB,, | Performed by: INTERNAL MEDICINE

## 2024-12-18 PROCEDURE — 3080F DIAST BP >= 90 MM HG: CPT | Mod: CPTII,S$GLB,, | Performed by: INTERNAL MEDICINE

## 2024-12-18 PROCEDURE — 3008F BODY MASS INDEX DOCD: CPT | Mod: CPTII,S$GLB,, | Performed by: INTERNAL MEDICINE

## 2024-12-18 RX ORDER — LEUCOVORIN CALCIUM 5 MG/1
5 TABLET ORAL DAILY
Qty: 30 TABLET | Refills: 11 | Status: SHIPPED | OUTPATIENT
Start: 2024-12-18 | End: 2025-12-18

## 2024-12-18 RX ORDER — HYDROCODONE BITARTRATE AND ACETAMINOPHEN 10; 325 MG/1; MG/1
1 TABLET ORAL EVERY 8 HOURS PRN
Qty: 90 TABLET | Refills: 0 | Status: SHIPPED | OUTPATIENT
Start: 2025-02-18 | End: 2025-03-20

## 2024-12-18 RX ORDER — FLUTICASONE FUROATE, UMECLIDINIUM BROMIDE AND VILANTEROL TRIFENATATE 100; 62.5; 25 UG/1; UG/1; UG/1
1 POWDER RESPIRATORY (INHALATION) DAILY
Qty: 28 EACH | Refills: 11 | Status: SHIPPED | OUTPATIENT
Start: 2024-12-18

## 2024-12-18 RX ORDER — HYDROCODONE BITARTRATE AND ACETAMINOPHEN 10; 325 MG/1; MG/1
1 TABLET ORAL EVERY 8 HOURS PRN
Qty: 90 TABLET | Refills: 0 | Status: SHIPPED | OUTPATIENT
Start: 2025-01-17 | End: 2025-02-16

## 2024-12-18 RX ORDER — QUETIAPINE FUMARATE 50 MG/1
150 TABLET, FILM COATED ORAL NIGHTLY
Qty: 270 TABLET | Refills: 1 | Status: SHIPPED | OUTPATIENT
Start: 2024-12-18

## 2024-12-18 RX ORDER — ALBUTEROL SULFATE 90 UG/1
2 INHALANT RESPIRATORY (INHALATION) EVERY 6 HOURS PRN
Qty: 18 G | Refills: 3 | Status: SHIPPED | OUTPATIENT
Start: 2024-12-18 | End: 2025-12-18

## 2024-12-18 RX ORDER — CYCLOBENZAPRINE HCL 10 MG
TABLET ORAL
Qty: 270 TABLET | Refills: 1 | Status: SHIPPED | OUTPATIENT
Start: 2024-12-18

## 2024-12-18 RX ORDER — HYDROCODONE BITARTRATE AND ACETAMINOPHEN 10; 325 MG/1; MG/1
1 TABLET ORAL EVERY 8 HOURS PRN
Qty: 90 TABLET | Refills: 0 | Status: SHIPPED | OUTPATIENT
Start: 2024-12-18 | End: 2024-12-18 | Stop reason: SDUPTHER

## 2024-12-18 RX ORDER — FLUCONAZOLE 150 MG/1
150 TABLET ORAL DAILY
Qty: 7 TABLET | Refills: 0 | Status: SHIPPED | OUTPATIENT
Start: 2024-12-18 | End: 2024-12-25

## 2024-12-18 RX ORDER — PREDNISONE 2.5 MG/1
TABLET ORAL
Qty: 120 TABLET | Refills: 3 | Status: SHIPPED | OUTPATIENT
Start: 2024-12-18

## 2024-12-18 RX ORDER — GABAPENTIN 300 MG/1
600 CAPSULE ORAL 2 TIMES DAILY
Qty: 360 CAPSULE | Refills: 1 | Status: SHIPPED | OUTPATIENT
Start: 2024-12-18 | End: 2025-12-13

## 2024-12-18 RX ORDER — AMLODIPINE BESYLATE 10 MG/1
10 TABLET ORAL DAILY
Qty: 90 TABLET | Refills: 3 | Status: SHIPPED | OUTPATIENT
Start: 2024-12-18 | End: 2025-12-18

## 2024-12-18 RX ORDER — HYDROCODONE BITARTRATE AND ACETAMINOPHEN 10; 325 MG/1; MG/1
1 TABLET ORAL EVERY 8 HOURS PRN
Qty: 90 TABLET | Refills: 0 | Status: SHIPPED | OUTPATIENT
Start: 2024-12-18 | End: 2025-01-17

## 2024-12-18 RX ORDER — HYDROXYCHLOROQUINE SULFATE 200 MG/1
200 TABLET, FILM COATED ORAL DAILY
Qty: 90 TABLET | Refills: 3 | Status: SHIPPED | OUTPATIENT
Start: 2024-12-18

## 2024-12-18 RX ORDER — LEVOFLOXACIN 500 MG/1
500 TABLET, FILM COATED ORAL DAILY
Qty: 15 TABLET | Refills: 1 | Status: SHIPPED | OUTPATIENT
Start: 2024-12-18 | End: 2025-01-17

## 2024-12-18 ASSESSMENT — ROUTINE ASSESSMENT OF PATIENT INDEX DATA (RAPID3)
PATIENT GLOBAL ASSESSMENT SCORE: 8
TOTAL RAPID3 SCORE: 6.22
MDHAQ FUNCTION SCORE: 1.1
PSYCHOLOGICAL DISTRESS SCORE: 3.3
PAIN SCORE: 7

## 2024-12-18 NOTE — PROGRESS NOTES
Subjective:     Patient ID:  Barbara Mims    Chief Complaint:  Disease Management     History of Present Illness:  Pt is a 64 y.o. female   psoriatic arthritis, seronegative RA, and pain management. She is running fever last 3 to 4 hours then it goes away. dailyShe is doing poorly--reports increased pain and swelling in her PIP and DIP joints. She has destructive changes in her fingernails. She reports worsening psoriasis. She complains of increased pain in her feet (MTPs) and bottom of her first when she first puts weight on her feet in the morning. She has been compliant with plaquenil and prednisone 7.5 mg PRN for flares. Rinvoq was denied without failure of TNF. She has needle phobia and is not interested in SC treatment.      She has chronic neck pain s/p cervical surgery and is no longer followed by pain management after multiple unsuccessful interventional treatments. She is on norco and fioricet PRN for severe pain.      We reviewed her recent DEXA consistent with osteoporosis and she is on fosamax.      She is due for f/u low dose CT.      She complains of fever, cough, sore throat, and hoarseness x 7 days.      Current treatment:  1. Norco 10/325 TID PRN for pain  2. OTC ibuprofen 400 mg daily  3. Prednisone 5 mg daily  4. plaquenil  Rheumatologic History:   - Diagnosis/es:  - Positive serologies:  - Infectious screening labs:  - Previous Treatments:  - Current Treatments:     Interval History:   Hospitalization since last office visit: No    Patient Active Problem List    Diagnosis Date Noted    Tobacco use 03/11/2019    Psoriatic arthritis 11/09/2014    Psoriasis with pustules 11/09/2014    Anxiety and depression 11/09/2014    Raynaud's disease 11/09/2014    Anemia  11/09/2014     Past Surgical History:   Procedure Laterality Date    CARDIAC CATHETERIZATION      CERVICAL DISC SURGERY      HYSTERECTOMY      LITHOTRIPSY       Social History     Tobacco Use    Smoking status: Every Day     Current  packs/day: 0.50     Average packs/day: 1 pack/day for 49.0 years (47.5 ttl pk-yrs)     Types: Cigarettes     Start date: 1976    Smokeless tobacco: Never   Substance Use Topics    Alcohol use: No    Drug use: No     Family History   Problem Relation Name Age of Onset    Heart disease Father      Heart disease Brother      Heart disease Brother       Review of patient's allergies indicates:   Allergen Reactions    Ancef [cefazolin] Anaphylaxis and Shortness Of Breath    Demerol [meperidine] Anaphylaxis    Lisinopril Other (See Comments)     cough    Xanax [alprazolam] Other (See Comments)     Makes feel really bad       Review of Systems   Review of Systems   Constitutional:  Positive for chills and fatigue. Negative for activity change, appetite change, diaphoresis, fever and unexpected weight change.   HENT:  Negative for congestion, dental problem, ear discharge, ear pain, facial swelling, mouth sores, nosebleeds, postnasal drip, rhinorrhea, sinus pressure, sneezing, sore throat, tinnitus, trouble swallowing and voice change.    Eyes:  Negative for photophobia, pain, discharge, redness and itching.   Respiratory:  Positive for cough. Negative for apnea, chest tightness, shortness of breath and wheezing.    Cardiovascular:  Positive for leg swelling. Negative for chest pain and palpitations.   Gastrointestinal:  Positive for abdominal pain. Negative for abdominal distention, constipation, diarrhea, nausea and vomiting.   Endocrine: Negative for cold intolerance, heat intolerance, polydipsia and polyuria.   Genitourinary:  Negative for decreased urine volume, difficulty urinating, dysuria, flank pain, frequency, hematuria and urgency.   Musculoskeletal:  Positive for arthralgias, back pain, gait problem, joint swelling, myalgias, neck pain and neck stiffness.   Skin:  Negative for pallor, rash and wound.   Allergic/Immunologic: Negative for immunocompromised state.   Neurological:  Negative for dizziness, tremors,  numbness and headaches.   Hematological:  Negative for adenopathy. Does not bruise/bleed easily.   Psychiatric/Behavioral:  Negative for sleep disturbance. The patient is not nervous/anxious.         Current Medications:  Current Outpatient Medications   Medication Instructions    acetaminophen (TYLENOL) 650 mg, Every 8 hours PRN    albuterol (VENTOLIN HFA) 90 mcg/actuation inhaler 2 puffs, Inhalation, Every 6 hours PRN    amLODIPine (NORVASC) 10 mg, Oral, Daily    aspirin (ECOTRIN) 81 mg, Daily    atorvastatin (LIPITOR) 20 mg, Daily    budesonide (PULMICORT) 0.5 mg, Nebulization, Daily, Controller    butalbital-acetaminophen-caffeine -40 mg (FIORICET, ESGIC) -40 mg per tablet 1 tablet, Oral, Every 6 hours PRN    cyclobenzaprine (FLEXERIL) 10 MG tablet TAKE ONE TABLET BY MOUTH THREE TIMES DAILY AS NEEDED FOR MUSCLE SPASMS    cyproheptadine (PERIACTIN) 4 mg, Oral, Nightly    diclofenac sodium (PENNSAID) 40 mg, Topical (Top), 2 times daily    DOCOSAHEXAENOIC ACID ORAL 1 g    fluconazole (DIFLUCAN) 150 mg, Oral, Daily    fluticasone-umeclidin-vilanter (TRELEGY ELLIPTA) 100-62.5-25 mcg DsDv 1 puff, Inhalation, Daily    gabapentin (NEURONTIN) 600 mg, Oral, 2 times daily    HYDROcodone-acetaminophen (NORCO)  mg per tablet 1 tablet, Oral, Every 8 hours PRN    HYDROcodone-acetaminophen (NORCO)  mg per tablet 1 tablet, Oral, Every 8 hours PRN    HYDROcodone-acetaminophen (NORCO)  mg per tablet 1 tablet, Oral, Every 8 hours PRN    hydroxychloroquine (PLAQUENIL) 200 mg, Oral, Daily    ipratropium (ATROVENT) 500 mcg, Nebulization, 4 times daily, Rescue    leucovorin (WELLCOVORIN) 5 mg, Oral, Daily    levocetirizine (XYZAL) 5 mg, Oral, Nightly    levoFLOXacin (LEVAQUIN) 500 mg, Oral, Daily    levothyroxine (SYNTHROID) 50 mcg, Oral, Before breakfast    LIDOcaine-prilocaine (EMLA) cream apply topically TO THE affected AREAS AS NEEDED    metoprolol succinate (TOPROL-XL) 25 mg, Oral, Nightly     nebulizer accessories Kit 1 each, Misc.(Non-Drug; Combo Route), Daily    omega 3-dha-epa-fish oil 300-1,000 mg Cap 1 g, Every morning    omeprazole (PRILOSEC) 40 mg, Oral, Daily    paroxetine (PAXIL) 40 mg, Oral, Daily    predniSONE (DELTASONE) 2.5 MG tablet TAKE 4 TABLETS BY MOUTH ONCE DAILY    QUEtiapine (SEROQUEL) 150 mg, Oral, Nightly    triamcinolone acetonide 0.1% (KENALOG) 0.1 % ointment Topical (Top), 2 times daily         Objective:     Vitals:    12/18/24 1545   BP: (!) 136/94   Pulse: 85   SpO2: 97%   Weight: 49.9 kg (110 lb 0.2 oz)   Height: 5' (1.524 m)   PainSc:   8   PainLoc: Neck      Body mass index is 21.48 kg/m².     Physical Examinations:  Physical Exam   Constitutional: She is oriented to person, place, and time.   HENT:   Head: Normocephalic and atraumatic.   Mouth/Throat: Oropharynx is clear and moist.   Eyes: Pupils are equal, round, and reactive to light.   Neck: No thyromegaly present.   Cardiovascular: Normal rate, regular rhythm and normal heart sounds. Exam reveals no gallop and no friction rub.   No murmur heard.  Pulmonary/Chest: Breath sounds normal. She has no wheezes. She has no rales. She exhibits no tenderness.   Abdominal: There is no abdominal tenderness. There is no rebound and no guarding.   Musculoskeletal:         General: Tenderness and deformity present.      Right shoulder: Tenderness present.      Left shoulder: Tenderness present.      Right elbow: Normal.      Left elbow: Normal.      Right wrist: Swelling and tenderness present.      Left wrist: Swelling and tenderness present.      Cervical back: Neck supple.      Right knee: No effusion. Tenderness present.      Left knee: No effusion. Tenderness present.      Left ankle: Swelling present.   Lymphadenopathy:     She has no cervical adenopathy.   Neurological: She is alert and oriented to person, place, and time. Gait normal.   Skin: No rash noted. No erythema. No pallor.   Psychiatric: Mood and affect normal.    Nursing note and vitals reviewed.      Right Side Rheumatological Exam     Examination finds the elbow normal.    The patient is tender to palpation of the shoulder, wrist, knee, 1st PIP, 1st MCP, 2nd PIP, 2nd MCP, 3rd PIP, 3rd MCP, 4th PIP, 4th MCP, 5th PIP and 5th MCP    She has swelling of the wrist, 1st PIP, 1st MCP, 2nd PIP, 2nd MCP, 3rd PIP, 3rd MCP, 4th PIP, 4th MCP, 5th PIP and 5th MCP    The patient has an enlarged wrist    Shoulder Exam   Tenderness Location: no tenderness    Range of Motion   Active abduction:  abnormal   Adduction: abnormal  Sensation: normal    Knee Exam   Tenderness Location: lateral joint line  Patellofemoral Crepitus: positive  Effusion: negative  Sensation: normal    Hip Exam   Tenderness Location: posterior  Sensation: normal    Elbow/Wrist Exam   Tenderness Location: no tenderness  Sensation: normal    Muscle Strength (0-5 scale):  Neck Flexion:  2  Neck Extension: 2  : 2/5     Left Side Rheumatological Exam     Examination finds the elbow normal.    The patient is tender to palpation of the shoulder, wrist, knee, 1st PIP, 1st MCP, 2nd PIP, 2nd MCP, 3rd PIP, 3rd MCP, 4th PIP, 4th MCP, 5th PIP, 5th MCP and temporomandibular.    She has swelling of the wrist, 1st PIP, 1st MCP, 2nd PIP, 2nd MCP, 3rd PIP, 3rd MCP, 4th PIP, 4th MCP, 5th PIP, 5th MCP, 1st CMC, 2nd DIP, 3rd DIP, 4th DIP, 5th DIP, knee, 1st MTP, 2nd MTP, 3rd MTP, 4th MTP, 1st toe IP, 2nd toe IP, 3rd toe IP, 4th toe IP and 5th toe IP    The patient has an enlarged wrist, 1st CMC, 2nd DIP, 3rd DIP, 4th DIP, 5th DIP, 1st toe IP, 2nd toe IP, 3rd toe IP, 4th toe IP and 5th toe IP.    Shoulder Exam   Tenderness Location: acromioclavicular joint    Range of Motion   Active abduction:  abnormal   Sensation: normal    Knee Exam     Patellofemoral Crepitus: positive  Effusion: negative  Sensation: normal    Hip Exam   Tenderness Location: posterior  Sensation: normal    Elbow/Wrist Exam   Sensation: normal    Muscle Strength  "(0-5 scale):  Neck Flexion:  2  Neck Extension: 2  :  1/5       Back/Neck Exam   General Inspection   Gait: normal            Disease Assessment Scores:  Patient's Global Assessment of arthritis (0-10): 3  Physician's Global Assessment of arthritis (0-10): 4  Number of Tender Joints (0-28): 2  Number of Swollen Joints (0-28): 4         No data to display                Monitoring Lab Results:  Lab Results   Component Value Date    WBC 10.33 04/04/2024    RBC 3.47 (L) 04/04/2024    HGB 10.8 (L) 04/04/2024    HCT 35.6 (L) 04/04/2024     (H) 04/04/2024    MCH 31.1 (H) 04/04/2024    MCHC 30.3 (L) 04/04/2024    RDW 12.6 04/04/2024     04/04/2024        Lab Results   Component Value Date     04/04/2024    K 4.8 04/04/2024     04/04/2024    CO2 26 04/04/2024    GLU 88 04/04/2024    BUN 17 04/04/2024    CREATININE 0.9 04/04/2024    CALCIUM 9.1 04/04/2024    PROT 6.9 04/04/2024    ALBUMIN 3.6 04/04/2024    BILITOT 0.1 04/04/2024    ALKPHOS 146 (H) 04/04/2024    AST 19 04/04/2024    ALT 9 (L) 04/04/2024    ANIONGAP 10 04/04/2024    EGFRNORACEVR >60.0 04/04/2024       Lab Results   Component Value Date    SEDRATE 52 (H) 04/04/2024    CRP 13.3 (H) 04/04/2024        Lab Results   Component Value Date    DWJPGRCM96KB 15 (L) 04/04/2024        Lab Results   Component Value Date    CHOL 114 05/05/2023    HDL 46 05/05/2023    LDLCALC 56 05/05/2023    TRIG 58 05/05/2023       Lab Results   Component Value Date    RF <10.0 07/13/2015    CCPANTIBODIE <0.5 11/20/2014     Lab Results   Component Value Date    ANASCREEN Negative <1:160 04/13/2018    DSDNA Positive 1:320 11/20/2014     No results found for: "HLABB27"    Infectious Disease Screening:  Lab Results   Component Value Date    HEPBSAG Non-reactive 12/05/2023    HEPBCAB Non-reactive 12/05/2023    HEPBSAB <3.00 12/05/2023    HEPBSAB Non-reactive 12/05/2023    HEPBIGM Non-reactive 12/05/2023     Lab Results   Component Value Date    HEPCAB Non-reactive " "12/05/2023     Lab Results   Component Value Date    TBGOLDPLUS Negative 12/05/2023     No results found for: "QUANTIFERON", "SVCMT", "QUANTAGVALUE", "QUANTNILVALU", "QUANTMITOGEN", "QFTTBAG", "QINT"     Imaging: DEXA, Xrays, MRIs, CTs, etc    Old & Outside Medical Records:  Reviewed old and all outside medical records available in Care Everywhere     Assessment:     Encounter Diagnoses   Name Primary?    Macrocytic anemia Yes    Psoriatic arthritis     Hypertension, unspecified type     Seronegative rheumatoid arthritis     Insomnia, unspecified type     Chronic pain syndrome     COPD (chronic obstructive pulmonary disease) with chronic bronchitis     Chronic cough     Personal history of nicotine dependence     Bronchitis        Plan:      Encounter Diagnoses   Name Primary?    Macrocytic anemia Yes    Psoriatic arthritis     Hypertension, unspecified type     Seronegative rheumatoid arthritis     Insomnia, unspecified type     Chronic pain syndrome     COPD (chronic obstructive pulmonary disease) with chronic bronchitis     Chronic cough     Personal history of nicotine dependence     Bronchitis        Diagnoses and all orders for this visit:    Macrocytic anemia  -     Vitamin B12; Future  -     Folate; Future  -     Sedimentation rate; Future  -     C-Reactive Protein; Future  -     Comprehensive Metabolic Panel; Future  -     CBC Auto Differential; Future  -     CT Chest Lung Screening Low Dose; Future  -     Transferrin; Future  -     Iron and TIBC; Future  -     leucovorin (WELLCOVORIN) 5 mg Tab; Take 1 tablet (5 mg total) by mouth once daily.  -     albuterol (VENTOLIN HFA) 90 mcg/actuation inhaler; Inhale 2 puffs into the lungs every 6 (six) hours as needed for Wheezing.  -     amLODIPine (NORVASC) 10 MG tablet; Take 1 tablet (10 mg total) by mouth once daily.  -     gabapentin (NEURONTIN) 300 MG capsule; Take 2 capsules (600 mg total) by mouth 2 (two) times daily.  -     hydroxychloroquine (PLAQUENIL) 200 " mg tablet; Take 1 tablet (200 mg total) by mouth once daily.  -     predniSONE (DELTASONE) 2.5 MG tablet; TAKE 4 TABLETS BY MOUTH ONCE DAILY  -     QUEtiapine (SEROQUEL) 50 MG tablet; Take 3 tablets (150 mg total) by mouth every evening.  -     cyclobenzaprine (FLEXERIL) 10 MG tablet; TAKE ONE TABLET BY MOUTH THREE TIMES DAILY AS NEEDED FOR MUSCLE SPASMS  -     fluticasone-umeclidin-vilanter (TRELEGY ELLIPTA) 100-62.5-25 mcg DsDv; Inhale 1 puff into the lungs once daily.  -     HYDROcodone-acetaminophen (NORCO)  mg per tablet; Take 1 tablet by mouth every 8 (eight) hours as needed for Pain.    Psoriatic arthritis  -     Sedimentation rate; Future  -     C-Reactive Protein; Future  -     Comprehensive Metabolic Panel; Future  -     CBC Auto Differential; Future  -     CT Chest Lung Screening Low Dose; Future  -     Transferrin; Future  -     Iron and TIBC; Future  -     leucovorin (WELLCOVORIN) 5 mg Tab; Take 1 tablet (5 mg total) by mouth once daily.  -     albuterol (VENTOLIN HFA) 90 mcg/actuation inhaler; Inhale 2 puffs into the lungs every 6 (six) hours as needed for Wheezing.  -     amLODIPine (NORVASC) 10 MG tablet; Take 1 tablet (10 mg total) by mouth once daily.  -     gabapentin (NEURONTIN) 300 MG capsule; Take 2 capsules (600 mg total) by mouth 2 (two) times daily.  -     hydroxychloroquine (PLAQUENIL) 200 mg tablet; Take 1 tablet (200 mg total) by mouth once daily.  -     predniSONE (DELTASONE) 2.5 MG tablet; TAKE 4 TABLETS BY MOUTH ONCE DAILY  -     QUEtiapine (SEROQUEL) 50 MG tablet; Take 3 tablets (150 mg total) by mouth every evening.  -     cyclobenzaprine (FLEXERIL) 10 MG tablet; TAKE ONE TABLET BY MOUTH THREE TIMES DAILY AS NEEDED FOR MUSCLE SPASMS  -     fluticasone-umeclidin-vilanter (TRELEGY ELLIPTA) 100-62.5-25 mcg DsDv; Inhale 1 puff into the lungs once daily.  -     HYDROcodone-acetaminophen (NORCO)  mg per tablet; Take 1 tablet by mouth every 8 (eight) hours as needed for Pain.  -      HYDROcodone-acetaminophen (NORCO)  mg per tablet; Take 1 tablet by mouth every 8 (eight) hours as needed for Pain.  -     HYDROcodone-acetaminophen (NORCO)  mg per tablet; Take 1 tablet by mouth every 8 (eight) hours as needed for Pain.    Hypertension, unspecified type  -     Sedimentation rate; Future  -     C-Reactive Protein; Future  -     Comprehensive Metabolic Panel; Future  -     CBC Auto Differential; Future  -     CT Chest Lung Screening Low Dose; Future  -     Transferrin; Future  -     Iron and TIBC; Future  -     leucovorin (WELLCOVORIN) 5 mg Tab; Take 1 tablet (5 mg total) by mouth once daily.  -     albuterol (VENTOLIN HFA) 90 mcg/actuation inhaler; Inhale 2 puffs into the lungs every 6 (six) hours as needed for Wheezing.  -     amLODIPine (NORVASC) 10 MG tablet; Take 1 tablet (10 mg total) by mouth once daily.  -     gabapentin (NEURONTIN) 300 MG capsule; Take 2 capsules (600 mg total) by mouth 2 (two) times daily.  -     hydroxychloroquine (PLAQUENIL) 200 mg tablet; Take 1 tablet (200 mg total) by mouth once daily.  -     predniSONE (DELTASONE) 2.5 MG tablet; TAKE 4 TABLETS BY MOUTH ONCE DAILY  -     QUEtiapine (SEROQUEL) 50 MG tablet; Take 3 tablets (150 mg total) by mouth every evening.  -     cyclobenzaprine (FLEXERIL) 10 MG tablet; TAKE ONE TABLET BY MOUTH THREE TIMES DAILY AS NEEDED FOR MUSCLE SPASMS  -     fluticasone-umeclidin-vilanter (TRELEGY ELLIPTA) 100-62.5-25 mcg DsDv; Inhale 1 puff into the lungs once daily.  -     HYDROcodone-acetaminophen (NORCO)  mg per tablet; Take 1 tablet by mouth every 8 (eight) hours as needed for Pain.  -     HYDROcodone-acetaminophen (NORCO)  mg per tablet; Take 1 tablet by mouth every 8 (eight) hours as needed for Pain.    Seronegative rheumatoid arthritis  -     Sedimentation rate; Future  -     C-Reactive Protein; Future  -     Comprehensive Metabolic Panel; Future  -     CBC Auto Differential; Future  -     CT Chest Lung  Screening Low Dose; Future  -     Transferrin; Future  -     Iron and TIBC; Future  -     leucovorin (WELLCOVORIN) 5 mg Tab; Take 1 tablet (5 mg total) by mouth once daily.  -     albuterol (VENTOLIN HFA) 90 mcg/actuation inhaler; Inhale 2 puffs into the lungs every 6 (six) hours as needed for Wheezing.  -     amLODIPine (NORVASC) 10 MG tablet; Take 1 tablet (10 mg total) by mouth once daily.  -     gabapentin (NEURONTIN) 300 MG capsule; Take 2 capsules (600 mg total) by mouth 2 (two) times daily.  -     hydroxychloroquine (PLAQUENIL) 200 mg tablet; Take 1 tablet (200 mg total) by mouth once daily.  -     predniSONE (DELTASONE) 2.5 MG tablet; TAKE 4 TABLETS BY MOUTH ONCE DAILY  -     QUEtiapine (SEROQUEL) 50 MG tablet; Take 3 tablets (150 mg total) by mouth every evening.  -     cyclobenzaprine (FLEXERIL) 10 MG tablet; TAKE ONE TABLET BY MOUTH THREE TIMES DAILY AS NEEDED FOR MUSCLE SPASMS  -     fluticasone-umeclidin-vilanter (TRELEGY ELLIPTA) 100-62.5-25 mcg DsDv; Inhale 1 puff into the lungs once daily.  -     HYDROcodone-acetaminophen (NORCO)  mg per tablet; Take 1 tablet by mouth every 8 (eight) hours as needed for Pain.  -     HYDROcodone-acetaminophen (NORCO)  mg per tablet; Take 1 tablet by mouth every 8 (eight) hours as needed for Pain.    Insomnia, unspecified type  -     Sedimentation rate; Future  -     C-Reactive Protein; Future  -     Comprehensive Metabolic Panel; Future  -     CBC Auto Differential; Future  -     CT Chest Lung Screening Low Dose; Future  -     Transferrin; Future  -     Iron and TIBC; Future  -     leucovorin (WELLCOVORIN) 5 mg Tab; Take 1 tablet (5 mg total) by mouth once daily.  -     albuterol (VENTOLIN HFA) 90 mcg/actuation inhaler; Inhale 2 puffs into the lungs every 6 (six) hours as needed for Wheezing.  -     amLODIPine (NORVASC) 10 MG tablet; Take 1 tablet (10 mg total) by mouth once daily.  -     gabapentin (NEURONTIN) 300 MG capsule; Take 2 capsules (600 mg  total) by mouth 2 (two) times daily.  -     hydroxychloroquine (PLAQUENIL) 200 mg tablet; Take 1 tablet (200 mg total) by mouth once daily.  -     predniSONE (DELTASONE) 2.5 MG tablet; TAKE 4 TABLETS BY MOUTH ONCE DAILY  -     QUEtiapine (SEROQUEL) 50 MG tablet; Take 3 tablets (150 mg total) by mouth every evening.  -     cyclobenzaprine (FLEXERIL) 10 MG tablet; TAKE ONE TABLET BY MOUTH THREE TIMES DAILY AS NEEDED FOR MUSCLE SPASMS  -     fluticasone-umeclidin-vilanter (TRELEGY ELLIPTA) 100-62.5-25 mcg DsDv; Inhale 1 puff into the lungs once daily.  -     HYDROcodone-acetaminophen (NORCO)  mg per tablet; Take 1 tablet by mouth every 8 (eight) hours as needed for Pain.  -     HYDROcodone-acetaminophen (NORCO)  mg per tablet; Take 1 tablet by mouth every 8 (eight) hours as needed for Pain.    Chronic pain syndrome  -     Sedimentation rate; Future  -     C-Reactive Protein; Future  -     Comprehensive Metabolic Panel; Future  -     CBC Auto Differential; Future  -     CT Chest Lung Screening Low Dose; Future  -     Transferrin; Future  -     Iron and TIBC; Future  -     leucovorin (WELLCOVORIN) 5 mg Tab; Take 1 tablet (5 mg total) by mouth once daily.  -     albuterol (VENTOLIN HFA) 90 mcg/actuation inhaler; Inhale 2 puffs into the lungs every 6 (six) hours as needed for Wheezing.  -     amLODIPine (NORVASC) 10 MG tablet; Take 1 tablet (10 mg total) by mouth once daily.  -     gabapentin (NEURONTIN) 300 MG capsule; Take 2 capsules (600 mg total) by mouth 2 (two) times daily.  -     hydroxychloroquine (PLAQUENIL) 200 mg tablet; Take 1 tablet (200 mg total) by mouth once daily.  -     predniSONE (DELTASONE) 2.5 MG tablet; TAKE 4 TABLETS BY MOUTH ONCE DAILY  -     QUEtiapine (SEROQUEL) 50 MG tablet; Take 3 tablets (150 mg total) by mouth every evening.  -     cyclobenzaprine (FLEXERIL) 10 MG tablet; TAKE ONE TABLET BY MOUTH THREE TIMES DAILY AS NEEDED FOR MUSCLE SPASMS  -     fluticasone-umeclidin-vilanter  (TRELEGY ELLIPTA) 100-62.5-25 mcg DsDv; Inhale 1 puff into the lungs once daily.  -     HYDROcodone-acetaminophen (NORCO)  mg per tablet; Take 1 tablet by mouth every 8 (eight) hours as needed for Pain.  -     HYDROcodone-acetaminophen (NORCO)  mg per tablet; Take 1 tablet by mouth every 8 (eight) hours as needed for Pain.  -     HYDROcodone-acetaminophen (NORCO)  mg per tablet; Take 1 tablet by mouth every 8 (eight) hours as needed for Pain.    COPD (chronic obstructive pulmonary disease) with chronic bronchitis  -     Sedimentation rate; Future  -     C-Reactive Protein; Future  -     Comprehensive Metabolic Panel; Future  -     CBC Auto Differential; Future  -     CT Chest Lung Screening Low Dose; Future  -     Transferrin; Future  -     Iron and TIBC; Future  -     leucovorin (WELLCOVORIN) 5 mg Tab; Take 1 tablet (5 mg total) by mouth once daily.  -     albuterol (VENTOLIN HFA) 90 mcg/actuation inhaler; Inhale 2 puffs into the lungs every 6 (six) hours as needed for Wheezing.  -     amLODIPine (NORVASC) 10 MG tablet; Take 1 tablet (10 mg total) by mouth once daily.  -     gabapentin (NEURONTIN) 300 MG capsule; Take 2 capsules (600 mg total) by mouth 2 (two) times daily.  -     hydroxychloroquine (PLAQUENIL) 200 mg tablet; Take 1 tablet (200 mg total) by mouth once daily.  -     predniSONE (DELTASONE) 2.5 MG tablet; TAKE 4 TABLETS BY MOUTH ONCE DAILY  -     QUEtiapine (SEROQUEL) 50 MG tablet; Take 3 tablets (150 mg total) by mouth every evening.  -     cyclobenzaprine (FLEXERIL) 10 MG tablet; TAKE ONE TABLET BY MOUTH THREE TIMES DAILY AS NEEDED FOR MUSCLE SPASMS  -     fluticasone-umeclidin-vilanter (TRELEGY ELLIPTA) 100-62.5-25 mcg DsDv; Inhale 1 puff into the lungs once daily.  -     HYDROcodone-acetaminophen (NORCO)  mg per tablet; Take 1 tablet by mouth every 8 (eight) hours as needed for Pain.  -     HYDROcodone-acetaminophen (NORCO)  mg per tablet; Take 1 tablet by mouth every  8 (eight) hours as needed for Pain.  -     HYDROcodone-acetaminophen (NORCO)  mg per tablet; Take 1 tablet by mouth every 8 (eight) hours as needed for Pain.    Chronic cough  -     Sedimentation rate; Future  -     C-Reactive Protein; Future  -     Comprehensive Metabolic Panel; Future  -     CBC Auto Differential; Future  -     CT Chest Lung Screening Low Dose; Future  -     Transferrin; Future  -     Iron and TIBC; Future  -     leucovorin (WELLCOVORIN) 5 mg Tab; Take 1 tablet (5 mg total) by mouth once daily.  -     albuterol (VENTOLIN HFA) 90 mcg/actuation inhaler; Inhale 2 puffs into the lungs every 6 (six) hours as needed for Wheezing.  -     amLODIPine (NORVASC) 10 MG tablet; Take 1 tablet (10 mg total) by mouth once daily.  -     gabapentin (NEURONTIN) 300 MG capsule; Take 2 capsules (600 mg total) by mouth 2 (two) times daily.  -     hydroxychloroquine (PLAQUENIL) 200 mg tablet; Take 1 tablet (200 mg total) by mouth once daily.  -     predniSONE (DELTASONE) 2.5 MG tablet; TAKE 4 TABLETS BY MOUTH ONCE DAILY  -     QUEtiapine (SEROQUEL) 50 MG tablet; Take 3 tablets (150 mg total) by mouth every evening.  -     cyclobenzaprine (FLEXERIL) 10 MG tablet; TAKE ONE TABLET BY MOUTH THREE TIMES DAILY AS NEEDED FOR MUSCLE SPASMS  -     fluticasone-umeclidin-vilanter (TRELEGY ELLIPTA) 100-62.5-25 mcg DsDv; Inhale 1 puff into the lungs once daily.  -     HYDROcodone-acetaminophen (NORCO)  mg per tablet; Take 1 tablet by mouth every 8 (eight) hours as needed for Pain.  -     HYDROcodone-acetaminophen (NORCO)  mg per tablet; Take 1 tablet by mouth every 8 (eight) hours as needed for Pain.    Personal history of nicotine dependence  -     CT Chest Lung Screening Low Dose; Future    Bronchitis  -     levoFLOXacin (LEVAQUIN) 500 MG tablet; Take 1 tablet (500 mg total) by mouth once daily.  -     fluconazole (DIFLUCAN) 150 MG Tab; Take 1 tablet (150 mg total) by mouth once daily. for 7 days        1. We  will do her CT and labs at [facility] in [place]  2. Added levaquin for FUO  3. F/u 4 months for pain meds   Follow-up 4 months    More than 50% of the  30 minute encounter was spent face to face counseling the patient regarding current status and future plan of care as well as side effects  of the medications. All questions were answered to patient's satisfaction also includes  non-face to face time preparing to see the patient (eg, review of tests), Obtaining and/or reviewing separately obtained history, Documenting clinical information in the electronic or other health record, Independently interpreting results

## 2024-12-20 DIAGNOSIS — F32.A ANXIETY AND DEPRESSION: ICD-10-CM

## 2024-12-20 DIAGNOSIS — L40.50 PSORIATIC ARTHRITIS: ICD-10-CM

## 2024-12-20 DIAGNOSIS — R05.3 CHRONIC COUGH: ICD-10-CM

## 2024-12-20 DIAGNOSIS — F41.9 ANXIETY AND DEPRESSION: ICD-10-CM

## 2024-12-20 DIAGNOSIS — J44.89 COPD (CHRONIC OBSTRUCTIVE PULMONARY DISEASE) WITH CHRONIC BRONCHITIS: ICD-10-CM

## 2024-12-20 DIAGNOSIS — R51.9 NONINTRACTABLE HEADACHE, UNSPECIFIED CHRONICITY PATTERN, UNSPECIFIED HEADACHE TYPE: ICD-10-CM

## 2024-12-20 DIAGNOSIS — L40.8 PSORIASIS WITH PUSTULES: ICD-10-CM

## 2024-12-23 RX ORDER — CYPROHEPTADINE HYDROCHLORIDE 4 MG/1
4 TABLET ORAL NIGHTLY
Qty: 30 TABLET | Refills: 3 | Status: SHIPPED | OUTPATIENT
Start: 2024-12-23

## 2025-01-02 RX ORDER — FLUTICASONE FUROATE AND VILANTEROL TRIFENATATE 200; 25 UG/1; UG/1
1 POWDER RESPIRATORY (INHALATION)
Refills: 12 | OUTPATIENT
Start: 2025-01-02

## 2025-01-06 DIAGNOSIS — I10 HYPERTENSION, UNSPECIFIED TYPE: ICD-10-CM

## 2025-01-07 RX ORDER — METOPROLOL SUCCINATE 25 MG/1
25 TABLET, EXTENDED RELEASE ORAL NIGHTLY
Qty: 90 TABLET | Refills: 0 | Status: SHIPPED | OUTPATIENT
Start: 2025-01-07

## 2025-01-16 DIAGNOSIS — G89.4 CHRONIC PAIN SYNDROME: ICD-10-CM

## 2025-01-16 DIAGNOSIS — L40.50 PSORIATIC ARTHRITIS: ICD-10-CM

## 2025-01-16 DIAGNOSIS — M06.00 SERONEGATIVE RHEUMATOID ARTHRITIS: ICD-10-CM

## 2025-01-16 RX ORDER — LIDOCAINE AND PRILOCAINE 25; 25 MG/G; MG/G
CREAM TOPICAL
Qty: 30 G | Refills: 3 | Status: SHIPPED | OUTPATIENT
Start: 2025-01-16

## 2025-01-20 DIAGNOSIS — J40 BRONCHITIS: ICD-10-CM

## 2025-01-23 RX ORDER — LEVOFLOXACIN 500 MG/1
500 TABLET, FILM COATED ORAL
Qty: 15 TABLET | Refills: 1 | Status: SHIPPED | OUTPATIENT
Start: 2025-01-23

## 2025-02-11 ENCOUNTER — TELEPHONE (OUTPATIENT)
Dept: RHEUMATOLOGY | Facility: CLINIC | Age: 65
End: 2025-02-11
Payer: MEDICARE

## 2025-02-11 NOTE — TELEPHONE ENCOUNTER
----- Message from Jimena sent at 2/11/2025  3:16 PM CST -----  Type:  Sooner Appointment Request    Caller is requesting a sooner appointment.  Caller declined first available appointment listed below.  Caller will not accept being placed on the waitlist and is requesting a message be sent to doctor.    Name of Caller:  pt   When is the first available appointment?  N/a  Symptoms:  need her FU appointment  Would the patient rather a call back or a response via [facility]? Call back  Best Call Back Number:  [phone]   Additional Information:   Call back to advise. Thanks!

## 2025-02-11 NOTE — TELEPHONE ENCOUNTER
Pt has new insurance and wants Dr. Rodriguez to put in orders for a new Ct of her lungs and blood work.   Infliximab Pregnancy And Lactation Text: This medication is Pregnancy Category B and is considered safe during pregnancy. It is unknown if this medication is excreted in breast milk.

## 2025-02-18 DIAGNOSIS — E03.9 HYPOTHYROIDISM, UNSPECIFIED TYPE: ICD-10-CM

## 2025-02-18 NOTE — TELEPHONE ENCOUNTER
Pharmacy requesting refill on Levothyroxine 50mcg  Pt's LOV 12/18/2024  Pt's NOV none scheduled  Medication pending

## 2025-02-20 RX ORDER — LEVOTHYROXINE SODIUM 50 UG/1
50 TABLET ORAL
Qty: 90 TABLET | Refills: 1 | Status: SHIPPED | OUTPATIENT
Start: 2025-02-20 | End: 2026-02-20

## 2025-03-07 DIAGNOSIS — L40.50 PSORIATIC ARTHRITIS: ICD-10-CM

## 2025-03-07 DIAGNOSIS — G89.4 CHRONIC PAIN SYNDROME: ICD-10-CM

## 2025-03-07 DIAGNOSIS — F32.A ANXIETY AND DEPRESSION: ICD-10-CM

## 2025-03-07 DIAGNOSIS — R51.9 NONINTRACTABLE HEADACHE, UNSPECIFIED CHRONICITY PATTERN, UNSPECIFIED HEADACHE TYPE: ICD-10-CM

## 2025-03-07 DIAGNOSIS — J40 BRONCHITIS: ICD-10-CM

## 2025-03-07 DIAGNOSIS — L40.8 PSORIASIS WITH PUSTULES: ICD-10-CM

## 2025-03-07 DIAGNOSIS — F41.9 ANXIETY AND DEPRESSION: ICD-10-CM

## 2025-03-07 DIAGNOSIS — R05.3 CHRONIC COUGH: ICD-10-CM

## 2025-03-07 DIAGNOSIS — J44.89 COPD (CHRONIC OBSTRUCTIVE PULMONARY DISEASE) WITH CHRONIC BRONCHITIS: ICD-10-CM

## 2025-03-07 RX ORDER — BUTALBITAL, ACETAMINOPHEN AND CAFFEINE 50; 325; 40 MG/1; MG/1; MG/1
1 TABLET ORAL EVERY 6 HOURS PRN
Qty: 120 TABLET | Refills: 3 | Status: SHIPPED | OUTPATIENT
Start: 2025-03-07

## 2025-03-12 DIAGNOSIS — G89.4 CHRONIC PAIN SYNDROME: ICD-10-CM

## 2025-03-12 DIAGNOSIS — L40.50 PSORIATIC ARTHRITIS: ICD-10-CM

## 2025-03-12 DIAGNOSIS — M06.00 SERONEGATIVE RHEUMATOID ARTHRITIS: ICD-10-CM

## 2025-03-12 DIAGNOSIS — G47.00 INSOMNIA, UNSPECIFIED TYPE: ICD-10-CM

## 2025-03-12 DIAGNOSIS — R05.3 CHRONIC COUGH: ICD-10-CM

## 2025-03-12 DIAGNOSIS — J44.89 COPD (CHRONIC OBSTRUCTIVE PULMONARY DISEASE) WITH CHRONIC BRONCHITIS: ICD-10-CM

## 2025-03-13 RX ORDER — HYDROCODONE BITARTRATE AND ACETAMINOPHEN 10; 325 MG/1; MG/1
1 TABLET ORAL EVERY 8 HOURS PRN
Qty: 90 TABLET | Refills: 0 | Status: SHIPPED | OUTPATIENT
Start: 2025-03-13

## 2025-03-17 DIAGNOSIS — J40 BRONCHITIS: ICD-10-CM

## 2025-03-18 RX ORDER — LEVOFLOXACIN 500 MG/1
500 TABLET, FILM COATED ORAL DAILY
Qty: 15 TABLET | Refills: 1 | Status: SHIPPED | OUTPATIENT
Start: 2025-03-18

## 2025-03-19 DIAGNOSIS — M06.00 SERONEGATIVE RHEUMATOID ARTHRITIS: ICD-10-CM

## 2025-03-19 DIAGNOSIS — G89.4 CHRONIC PAIN SYNDROME: ICD-10-CM

## 2025-03-19 DIAGNOSIS — L40.50 PSORIATIC ARTHRITIS: ICD-10-CM

## 2025-03-19 NOTE — TELEPHONE ENCOUNTER
Pharmacy requesting refill on Emla cream  Pt's LOV 12/18/2024  Pt's NOV none scheduled  Medication pending

## 2025-03-21 RX ORDER — LIDOCAINE AND PRILOCAINE 25; 25 MG/G; MG/G
CREAM TOPICAL
Qty: 30 G | Refills: 3 | Status: SHIPPED | OUTPATIENT
Start: 2025-03-21

## 2025-03-26 ENCOUNTER — TELEPHONE (OUTPATIENT)
Dept: RHEUMATOLOGY | Facility: CLINIC | Age: 65
End: 2025-03-26
Payer: MEDICARE

## 2025-03-26 NOTE — TELEPHONE ENCOUNTER
----- Message from Ethan Garcia sent at 3/25/2025 10:15 AM CDT -----    ----- Message -----  From: Francine Chambers  Sent: 3/25/2025   9:37 AM CDT  To: Jennifer Burton Staff    Type:  Needs Medical AdviceWho Called: ptWould the patient rather a call back or a response via [facility]? [facility] [place] Call Back Number: [phone]: [phone] Hyun Luque Additional Information: pt is calling to request that the office fax something to the fax listed above stating pt cannot serve on jury currently and needs to be excused. Please sent fax before 3/27. Pt would like a call or vm to let her know this was handled.  Please call back to advise. Thanks!

## 2025-04-07 ENCOUNTER — TELEPHONE (OUTPATIENT)
Dept: RHEUMATOLOGY | Facility: CLINIC | Age: 65
End: 2025-04-07
Payer: MEDICARE

## 2025-04-08 NOTE — TELEPHONE ENCOUNTER
----- Message from Helena sent at 4/8/2025 12:06 PM CDT -----  Contact: self  Type:  Needs Medical AdviceWho Called: selfSymptoms (please be specific): pt is needing to schedule an appt with dr, pt has been trying to get in contact with nurse to schedule. Would the patient rather a call back or a response via [facility]? CALL or text she request, phone is acting up she sts, told her about portal she doesn't use it. Best Call Back Number: [phone] Information: please advise and thank you.

## 2025-04-10 DIAGNOSIS — I10 HYPERTENSION, UNSPECIFIED TYPE: ICD-10-CM

## 2025-04-10 DIAGNOSIS — L40.50 PSORIATIC ARTHRITIS: ICD-10-CM

## 2025-04-10 DIAGNOSIS — R05.3 CHRONIC COUGH: ICD-10-CM

## 2025-04-10 DIAGNOSIS — J44.89 COPD (CHRONIC OBSTRUCTIVE PULMONARY DISEASE) WITH CHRONIC BRONCHITIS: ICD-10-CM

## 2025-04-10 DIAGNOSIS — G47.00 INSOMNIA, UNSPECIFIED TYPE: ICD-10-CM

## 2025-04-10 DIAGNOSIS — M06.00 SERONEGATIVE RHEUMATOID ARTHRITIS: ICD-10-CM

## 2025-04-10 DIAGNOSIS — G89.4 CHRONIC PAIN SYNDROME: ICD-10-CM

## 2025-04-10 DIAGNOSIS — D53.9 MACROCYTIC ANEMIA: ICD-10-CM

## 2025-04-10 RX ORDER — ALBUTEROL SULFATE 90 UG/1
INHALANT RESPIRATORY (INHALATION)
Qty: 18 G | Refills: 3 | Status: SHIPPED | OUTPATIENT
Start: 2025-04-10

## 2025-04-14 DIAGNOSIS — F32.A ANXIETY AND DEPRESSION: ICD-10-CM

## 2025-04-14 DIAGNOSIS — M06.00 SERONEGATIVE RHEUMATOID ARTHRITIS: ICD-10-CM

## 2025-04-14 DIAGNOSIS — L40.8 PSORIASIS WITH PUSTULES: ICD-10-CM

## 2025-04-14 DIAGNOSIS — F41.9 ANXIETY AND DEPRESSION: ICD-10-CM

## 2025-04-14 DIAGNOSIS — J44.89 COPD (CHRONIC OBSTRUCTIVE PULMONARY DISEASE) WITH CHRONIC BRONCHITIS: ICD-10-CM

## 2025-04-14 DIAGNOSIS — R05.3 CHRONIC COUGH: ICD-10-CM

## 2025-04-14 DIAGNOSIS — G47.00 INSOMNIA, UNSPECIFIED TYPE: ICD-10-CM

## 2025-04-14 DIAGNOSIS — R51.9 NONINTRACTABLE HEADACHE, UNSPECIFIED CHRONICITY PATTERN, UNSPECIFIED HEADACHE TYPE: ICD-10-CM

## 2025-04-14 DIAGNOSIS — G89.4 CHRONIC PAIN SYNDROME: ICD-10-CM

## 2025-04-14 DIAGNOSIS — L40.50 PSORIATIC ARTHRITIS: ICD-10-CM

## 2025-04-16 ENCOUNTER — PATIENT MESSAGE (OUTPATIENT)
Dept: RHEUMATOLOGY | Facility: CLINIC | Age: 65
End: 2025-04-16
Payer: MEDICARE

## 2025-04-16 DIAGNOSIS — R05.3 CHRONIC COUGH: ICD-10-CM

## 2025-04-16 DIAGNOSIS — G89.4 CHRONIC PAIN SYNDROME: ICD-10-CM

## 2025-04-16 DIAGNOSIS — J44.89 COPD (CHRONIC OBSTRUCTIVE PULMONARY DISEASE) WITH CHRONIC BRONCHITIS: ICD-10-CM

## 2025-04-16 DIAGNOSIS — I10 HYPERTENSION, UNSPECIFIED TYPE: ICD-10-CM

## 2025-04-16 DIAGNOSIS — G47.00 INSOMNIA, UNSPECIFIED TYPE: ICD-10-CM

## 2025-04-16 DIAGNOSIS — L40.50 PSORIATIC ARTHRITIS: ICD-10-CM

## 2025-04-16 DIAGNOSIS — M06.00 SERONEGATIVE RHEUMATOID ARTHRITIS: ICD-10-CM

## 2025-04-16 RX ORDER — HYDROCODONE BITARTRATE AND ACETAMINOPHEN 10; 325 MG/1; MG/1
1 TABLET ORAL EVERY 8 HOURS PRN
Qty: 90 TABLET | Refills: 0 | Status: SHIPPED | OUTPATIENT
Start: 2025-05-14

## 2025-04-16 RX ORDER — METOPROLOL SUCCINATE 25 MG/1
25 TABLET, EXTENDED RELEASE ORAL NIGHTLY
Qty: 90 TABLET | Refills: 0 | Status: SHIPPED | OUTPATIENT
Start: 2025-04-16

## 2025-04-16 RX ORDER — HYDROCODONE BITARTRATE AND ACETAMINOPHEN 10; 325 MG/1; MG/1
1 TABLET ORAL EVERY 8 HOURS PRN
Qty: 90 TABLET | Refills: 0 | Status: SHIPPED | OUTPATIENT
Start: 2025-04-16

## 2025-04-16 RX ORDER — HYDROCODONE BITARTRATE AND ACETAMINOPHEN 10; 325 MG/1; MG/1
1 TABLET ORAL EVERY 8 HOURS PRN
Qty: 90 TABLET | Refills: 0 | OUTPATIENT
Start: 2025-04-16

## 2025-04-17 DIAGNOSIS — F41.9 ANXIETY AND DEPRESSION: ICD-10-CM

## 2025-04-17 DIAGNOSIS — F32.A ANXIETY AND DEPRESSION: ICD-10-CM

## 2025-04-17 RX ORDER — CYPROHEPTADINE HYDROCHLORIDE 4 MG/1
4 TABLET ORAL NIGHTLY
Qty: 30 TABLET | Refills: 3 | Status: SHIPPED | OUTPATIENT
Start: 2025-04-17

## 2025-04-17 NOTE — TELEPHONE ENCOUNTER
Pharmacy requesting refill on Paroxetine 40mg  Pt's LOV 12/18/2024  Pt's NOV 05/23/2025  Medication pending

## 2025-04-18 RX ORDER — PAROXETINE HYDROCHLORIDE 40 MG/1
40 TABLET, FILM COATED ORAL DAILY
Qty: 90 TABLET | Refills: 1 | Status: SHIPPED | OUTPATIENT
Start: 2025-04-18

## 2025-04-29 DIAGNOSIS — J40 BRONCHITIS: ICD-10-CM

## 2025-04-29 RX ORDER — LEVOFLOXACIN 500 MG/1
500 TABLET, FILM COATED ORAL
Qty: 15 TABLET | Refills: 1 | Status: SHIPPED | OUTPATIENT
Start: 2025-04-29

## 2025-05-05 ENCOUNTER — TELEPHONE (OUTPATIENT)
Dept: RHEUMATOLOGY | Facility: CLINIC | Age: 65
End: 2025-05-05
Payer: MEDICARE

## 2025-05-05 NOTE — TELEPHONE ENCOUNTER
----- Message from Gloria sent at 5/1/2025  1:30 PM CDT -----  Type:  Needs Medical AdviceWho Called: MATHIEU MENDOZA [[id number]]Would the patient rather a call back or a response via [facility]? Call back Best Call Back Number:  [phone] Information: patient states she would like to have the providers office send a fax for her jury duty documentation. Patient states the documentation for subpoena was for 05/13 and she needs to make sure the  excuse for jury duty is listed as   permanently  disabled. Patient states she spoke with Rebel in the court office for this and was advised to have it faxed to [phone]. Please call back with further assistance.

## 2025-05-06 NOTE — TELEPHONE ENCOUNTER
----- Message from Gloria sent at 5/1/2025  1:30 PM CDT -----  Type:  Needs Medical AdviceWho Called: MATHIEU MENDOZA [[id number]]Would the patient rather a call back or a response via [facility]? Call back Best Call Back Number:  [phone] Information: patient states she would like to have the providers office send a fax for her jury duty documentation. Patient states the documentation for subpoena was for 05/13 and she needs to make sure the  excuse for jury duty is listed as   permanently  disabled. Patient states she spoke with Rebel in the court office for this and was advised to have it faxed to [phone]. Please call back with further assistance.

## 2025-05-23 ENCOUNTER — OFFICE VISIT (OUTPATIENT)
Dept: RHEUMATOLOGY | Facility: CLINIC | Age: 65
End: 2025-05-23
Payer: MEDICARE

## 2025-05-23 VITALS
BODY MASS INDEX: 20.52 KG/M2 | HEART RATE: 105 BPM | WEIGHT: 104.5 LBS | DIASTOLIC BLOOD PRESSURE: 92 MMHG | SYSTOLIC BLOOD PRESSURE: 152 MMHG | HEIGHT: 60 IN

## 2025-05-23 DIAGNOSIS — I10 HYPERTENSION, UNSPECIFIED TYPE: ICD-10-CM

## 2025-05-23 DIAGNOSIS — G89.4 CHRONIC PAIN SYNDROME: ICD-10-CM

## 2025-05-23 DIAGNOSIS — G47.00 INSOMNIA, UNSPECIFIED TYPE: ICD-10-CM

## 2025-05-23 DIAGNOSIS — J18.9 PNEUMONIA DUE TO INFECTIOUS ORGANISM, UNSPECIFIED LATERALITY, UNSPECIFIED PART OF LUNG: ICD-10-CM

## 2025-05-23 DIAGNOSIS — L40.8 PSORIASIS WITH PUSTULES: ICD-10-CM

## 2025-05-23 DIAGNOSIS — F32.A ANXIETY AND DEPRESSION: ICD-10-CM

## 2025-05-23 DIAGNOSIS — L40.50 PSORIATIC ARTHRITIS: Primary | ICD-10-CM

## 2025-05-23 DIAGNOSIS — J84.9 INTERSTITIAL PULMONARY DISEASE, UNSPECIFIED: ICD-10-CM

## 2025-05-23 DIAGNOSIS — J44.89 COPD (CHRONIC OBSTRUCTIVE PULMONARY DISEASE) WITH CHRONIC BRONCHITIS: ICD-10-CM

## 2025-05-23 DIAGNOSIS — R51.9 NONINTRACTABLE HEADACHE, UNSPECIFIED CHRONICITY PATTERN, UNSPECIFIED HEADACHE TYPE: ICD-10-CM

## 2025-05-23 DIAGNOSIS — M06.00 SERONEGATIVE RHEUMATOID ARTHRITIS: ICD-10-CM

## 2025-05-23 DIAGNOSIS — D53.9 MACROCYTIC ANEMIA: ICD-10-CM

## 2025-05-23 DIAGNOSIS — F41.9 ANXIETY AND DEPRESSION: ICD-10-CM

## 2025-05-23 PROCEDURE — 3288F FALL RISK ASSESSMENT DOCD: CPT | Mod: CPTII,S$GLB,, | Performed by: INTERNAL MEDICINE

## 2025-05-23 PROCEDURE — 99999 PR PBB SHADOW E&M-EST. PATIENT-LVL V: CPT | Mod: PBBFAC,,, | Performed by: INTERNAL MEDICINE

## 2025-05-23 PROCEDURE — 96372 THER/PROPH/DIAG INJ SC/IM: CPT | Mod: S$GLB,,, | Performed by: INTERNAL MEDICINE

## 2025-05-23 PROCEDURE — 3080F DIAST BP >= 90 MM HG: CPT | Mod: CPTII,S$GLB,, | Performed by: INTERNAL MEDICINE

## 2025-05-23 PROCEDURE — 99214 OFFICE O/P EST MOD 30 MIN: CPT | Mod: 25,S$GLB,, | Performed by: INTERNAL MEDICINE

## 2025-05-23 PROCEDURE — 3008F BODY MASS INDEX DOCD: CPT | Mod: CPTII,S$GLB,, | Performed by: INTERNAL MEDICINE

## 2025-05-23 PROCEDURE — 1101F PT FALLS ASSESS-DOCD LE1/YR: CPT | Mod: CPTII,S$GLB,, | Performed by: INTERNAL MEDICINE

## 2025-05-23 PROCEDURE — 1160F RVW MEDS BY RX/DR IN RCRD: CPT | Mod: CPTII,S$GLB,, | Performed by: INTERNAL MEDICINE

## 2025-05-23 PROCEDURE — 1159F MED LIST DOCD IN RCRD: CPT | Mod: CPTII,S$GLB,, | Performed by: INTERNAL MEDICINE

## 2025-05-23 PROCEDURE — 3077F SYST BP >= 140 MM HG: CPT | Mod: CPTII,S$GLB,, | Performed by: INTERNAL MEDICINE

## 2025-05-23 RX ORDER — CYANOCOBALAMIN 1000 UG/ML
1000 INJECTION, SOLUTION INTRAMUSCULAR; SUBCUTANEOUS
Status: COMPLETED | OUTPATIENT
Start: 2025-05-23 | End: 2025-05-23

## 2025-05-23 RX ORDER — HYDROCODONE BITARTRATE AND ACETAMINOPHEN 10; 325 MG/1; MG/1
1 TABLET ORAL EVERY 8 HOURS PRN
Qty: 90 TABLET | Refills: 0 | Status: SHIPPED | OUTPATIENT
Start: 2025-06-12 | End: 2025-07-12

## 2025-05-23 RX ORDER — HYDROCODONE BITARTRATE AND ACETAMINOPHEN 10; 325 MG/1; MG/1
1 TABLET ORAL EVERY 8 HOURS PRN
Qty: 90 TABLET | Refills: 0 | Status: SHIPPED | OUTPATIENT
Start: 2025-08-11 | End: 2025-09-10

## 2025-05-23 RX ORDER — BUTALBITAL, ACETAMINOPHEN AND CAFFEINE 50; 325; 40 MG/1; MG/1; MG/1
1 TABLET ORAL EVERY 6 HOURS PRN
Qty: 120 TABLET | Refills: 5 | Status: SHIPPED | OUTPATIENT
Start: 2025-05-23

## 2025-05-23 RX ORDER — METHYLPREDNISOLONE ACETATE 80 MG/ML
160 INJECTION, SUSPENSION INTRA-ARTICULAR; INTRALESIONAL; INTRAMUSCULAR; SOFT TISSUE
Status: COMPLETED | OUTPATIENT
Start: 2025-05-23 | End: 2025-05-23

## 2025-05-23 RX ORDER — PROMETHAZINE HYDROCHLORIDE AND DEXTROMETHORPHAN HYDROBROMIDE 6.25; 15 MG/5ML; MG/5ML
5 SYRUP ORAL EVERY 4 HOURS PRN
Qty: 240 ML | Refills: 1 | Status: SHIPPED | OUTPATIENT
Start: 2025-05-23

## 2025-05-23 RX ORDER — HYDROCODONE BITARTRATE AND ACETAMINOPHEN 10; 325 MG/1; MG/1
1 TABLET ORAL EVERY 8 HOURS PRN
Qty: 90 TABLET | Refills: 0 | Status: SHIPPED | OUTPATIENT
Start: 2025-07-11 | End: 2025-08-10

## 2025-05-23 RX ORDER — AZITHROMYCIN 250 MG/1
TABLET, FILM COATED ORAL
Qty: 6 TABLET | Refills: 0 | Status: SHIPPED | OUTPATIENT
Start: 2025-05-23

## 2025-05-23 RX ORDER — DOXYCYCLINE HYCLATE 100 MG
100 TABLET ORAL 2 TIMES DAILY
Qty: 20 TABLET | Refills: 0 | Status: SHIPPED | OUTPATIENT
Start: 2025-05-23 | End: 2025-06-02

## 2025-05-23 RX ORDER — KETOROLAC TROMETHAMINE 30 MG/ML
60 INJECTION, SOLUTION INTRAMUSCULAR; INTRAVENOUS
Status: COMPLETED | OUTPATIENT
Start: 2025-05-23 | End: 2025-05-23

## 2025-05-23 RX ORDER — PREDNISONE 20 MG/1
20 TABLET ORAL 2 TIMES DAILY
Qty: 14 TABLET | Refills: 0 | Status: SHIPPED | OUTPATIENT
Start: 2025-05-23 | End: 2025-05-30

## 2025-05-23 RX ADMIN — KETOROLAC TROMETHAMINE 60 MG: 30 INJECTION, SOLUTION INTRAMUSCULAR; INTRAVENOUS at 04:05

## 2025-05-23 RX ADMIN — METHYLPREDNISOLONE ACETATE 160 MG: 80 INJECTION, SUSPENSION INTRA-ARTICULAR; INTRALESIONAL; INTRAMUSCULAR; SOFT TISSUE at 04:05

## 2025-05-23 RX ADMIN — CYANOCOBALAMIN 1000 MCG: 1000 INJECTION, SOLUTION INTRAMUSCULAR; SUBCUTANEOUS at 04:05

## 2025-05-23 ASSESSMENT — ROUTINE ASSESSMENT OF PATIENT INDEX DATA (RAPID3)
PSYCHOLOGICAL DISTRESS SCORE: 2.2
FATIGUE SCORE: 0
MDHAQ FUNCTION SCORE: 1.2
TOTAL RAPID3 SCORE: 5.83
PAIN SCORE: 6.5
PATIENT GLOBAL ASSESSMENT SCORE: 7

## 2025-06-07 DIAGNOSIS — J18.9 PNEUMONIA DUE TO INFECTIOUS ORGANISM, UNSPECIFIED LATERALITY, UNSPECIFIED PART OF LUNG: ICD-10-CM

## 2025-06-07 DIAGNOSIS — J84.9 INTERSTITIAL PULMONARY DISEASE, UNSPECIFIED: ICD-10-CM

## 2025-06-09 DIAGNOSIS — R05.3 CHRONIC COUGH: ICD-10-CM

## 2025-06-09 DIAGNOSIS — F41.9 ANXIETY AND DEPRESSION: ICD-10-CM

## 2025-06-09 DIAGNOSIS — L40.50 PSORIATIC ARTHRITIS: ICD-10-CM

## 2025-06-09 DIAGNOSIS — L40.8 PSORIASIS WITH PUSTULES: ICD-10-CM

## 2025-06-09 DIAGNOSIS — J44.89 COPD (CHRONIC OBSTRUCTIVE PULMONARY DISEASE) WITH CHRONIC BRONCHITIS: ICD-10-CM

## 2025-06-09 DIAGNOSIS — F32.A ANXIETY AND DEPRESSION: ICD-10-CM

## 2025-06-09 DIAGNOSIS — R51.9 NONINTRACTABLE HEADACHE, UNSPECIFIED CHRONICITY PATTERN, UNSPECIFIED HEADACHE TYPE: ICD-10-CM

## 2025-06-10 RX ORDER — PROMETHAZINE HYDROCHLORIDE AND DEXTROMETHORPHAN HYDROBROMIDE 6.25; 15 MG/5ML; MG/5ML
SYRUP ORAL
Qty: 240 ML | Refills: 1 | Status: SHIPPED | OUTPATIENT
Start: 2025-06-10

## 2025-06-10 RX ORDER — FLUTICASONE FUROATE AND VILANTEROL TRIFENATATE 200; 25 UG/1; UG/1
1 POWDER RESPIRATORY (INHALATION)
Qty: 14 EACH | Refills: 12 | Status: SHIPPED | OUTPATIENT
Start: 2025-06-10

## 2025-06-13 DIAGNOSIS — L40.50 PSORIATIC ARTHRITIS: ICD-10-CM

## 2025-06-13 DIAGNOSIS — R05.3 CHRONIC COUGH: ICD-10-CM

## 2025-06-13 DIAGNOSIS — G47.00 INSOMNIA, UNSPECIFIED TYPE: ICD-10-CM

## 2025-06-13 DIAGNOSIS — G89.4 CHRONIC PAIN SYNDROME: ICD-10-CM

## 2025-06-13 DIAGNOSIS — D53.9 MACROCYTIC ANEMIA: ICD-10-CM

## 2025-06-13 DIAGNOSIS — J44.89 COPD (CHRONIC OBSTRUCTIVE PULMONARY DISEASE) WITH CHRONIC BRONCHITIS: ICD-10-CM

## 2025-06-13 DIAGNOSIS — I10 HYPERTENSION, UNSPECIFIED TYPE: ICD-10-CM

## 2025-06-13 DIAGNOSIS — M06.00 SERONEGATIVE RHEUMATOID ARTHRITIS: ICD-10-CM

## 2025-06-13 RX ORDER — CYCLOBENZAPRINE HCL 10 MG
10 TABLET ORAL
Qty: 270 TABLET | Refills: 1 | Status: SHIPPED | OUTPATIENT
Start: 2025-06-13

## 2025-06-30 DIAGNOSIS — J44.89 COPD (CHRONIC OBSTRUCTIVE PULMONARY DISEASE) WITH CHRONIC BRONCHITIS: ICD-10-CM

## 2025-06-30 DIAGNOSIS — G89.4 CHRONIC PAIN SYNDROME: ICD-10-CM

## 2025-06-30 DIAGNOSIS — R51.9 NONINTRACTABLE HEADACHE, UNSPECIFIED CHRONICITY PATTERN, UNSPECIFIED HEADACHE TYPE: ICD-10-CM

## 2025-06-30 DIAGNOSIS — L40.50 PSORIATIC ARTHRITIS: ICD-10-CM

## 2025-06-30 DIAGNOSIS — L40.8 PSORIASIS WITH PUSTULES: ICD-10-CM

## 2025-06-30 DIAGNOSIS — F41.9 ANXIETY AND DEPRESSION: ICD-10-CM

## 2025-06-30 DIAGNOSIS — F32.A ANXIETY AND DEPRESSION: ICD-10-CM

## 2025-07-01 RX ORDER — BUTALBITAL, ACETAMINOPHEN AND CAFFEINE 50; 325; 40 MG/1; MG/1; MG/1
1 TABLET ORAL EVERY 6 HOURS PRN
Qty: 120 TABLET | Refills: 3 | Status: SHIPPED | OUTPATIENT
Start: 2025-07-01 | End: 2025-07-05

## 2025-07-03 DIAGNOSIS — G89.4 CHRONIC PAIN SYNDROME: ICD-10-CM

## 2025-07-03 DIAGNOSIS — L40.8 PSORIASIS WITH PUSTULES: ICD-10-CM

## 2025-07-03 DIAGNOSIS — J44.89 COPD (CHRONIC OBSTRUCTIVE PULMONARY DISEASE) WITH CHRONIC BRONCHITIS: ICD-10-CM

## 2025-07-03 DIAGNOSIS — F41.9 ANXIETY AND DEPRESSION: ICD-10-CM

## 2025-07-03 DIAGNOSIS — F32.A ANXIETY AND DEPRESSION: ICD-10-CM

## 2025-07-03 DIAGNOSIS — R51.9 NONINTRACTABLE HEADACHE, UNSPECIFIED CHRONICITY PATTERN, UNSPECIFIED HEADACHE TYPE: ICD-10-CM

## 2025-07-03 DIAGNOSIS — L40.50 PSORIATIC ARTHRITIS: ICD-10-CM

## 2025-07-05 DIAGNOSIS — J18.9 PNEUMONIA DUE TO INFECTIOUS ORGANISM, UNSPECIFIED LATERALITY, UNSPECIFIED PART OF LUNG: ICD-10-CM

## 2025-07-05 DIAGNOSIS — J84.9 INTERSTITIAL PULMONARY DISEASE, UNSPECIFIED: ICD-10-CM

## 2025-07-05 RX ORDER — BUTALBITAL, ACETAMINOPHEN AND CAFFEINE 50; 325; 40 MG/1; MG/1; MG/1
1 TABLET ORAL EVERY 6 HOURS PRN
Qty: 120 TABLET | Refills: 3 | Status: SHIPPED | OUTPATIENT
Start: 2025-07-05

## 2025-07-08 RX ORDER — PROMETHAZINE HYDROCHLORIDE AND DEXTROMETHORPHAN HYDROBROMIDE 6.25; 15 MG/5ML; MG/5ML
SYRUP ORAL
Qty: 240 ML | Refills: 1 | Status: SHIPPED | OUTPATIENT
Start: 2025-07-08

## 2025-07-22 DIAGNOSIS — L40.50 PSORIATIC ARTHRITIS: ICD-10-CM

## 2025-07-22 DIAGNOSIS — D53.9 MACROCYTIC ANEMIA: ICD-10-CM

## 2025-07-22 DIAGNOSIS — G47.00 INSOMNIA, UNSPECIFIED TYPE: ICD-10-CM

## 2025-07-22 DIAGNOSIS — J44.89 COPD (CHRONIC OBSTRUCTIVE PULMONARY DISEASE) WITH CHRONIC BRONCHITIS: ICD-10-CM

## 2025-07-22 DIAGNOSIS — G89.4 CHRONIC PAIN SYNDROME: ICD-10-CM

## 2025-07-22 DIAGNOSIS — M06.00 SERONEGATIVE RHEUMATOID ARTHRITIS: ICD-10-CM

## 2025-07-22 DIAGNOSIS — R05.3 CHRONIC COUGH: ICD-10-CM

## 2025-07-22 DIAGNOSIS — I10 HYPERTENSION, UNSPECIFIED TYPE: ICD-10-CM

## 2025-07-24 RX ORDER — ALBUTEROL SULFATE 90 UG/1
INHALANT RESPIRATORY (INHALATION)
Qty: 18 G | Refills: 3 | Status: SHIPPED | OUTPATIENT
Start: 2025-07-24

## 2025-07-28 DIAGNOSIS — E03.9 HYPOTHYROIDISM, UNSPECIFIED TYPE: ICD-10-CM

## 2025-07-28 RX ORDER — LEVOTHYROXINE SODIUM 50 UG/1
50 TABLET ORAL
Qty: 90 TABLET | Refills: 1 | Status: SHIPPED | OUTPATIENT
Start: 2025-07-28

## 2025-07-29 ENCOUNTER — TELEPHONE (OUTPATIENT)
Dept: RHEUMATOLOGY | Facility: CLINIC | Age: 65
End: 2025-07-29
Payer: MEDICARE

## 2025-07-29 NOTE — TELEPHONE ENCOUNTER
Copied from CRM #[id number]. Topic: Appointments - Amb Referral  >> Jul 29, 2025 11:40 AM Nile wrote:  Type: Appointment Request    Caller is requesting appointment.  Caller declined first available appointment listed below.  Caller will not accept being placed on the waitlist and is requesting a message be sent to doctor.  Name of Caller:Pt  When is the first available appointment?Dept Book  Symptoms:Fu for medication  Would the patient rather a call back or a response via [facility]? call  Best Call Back Number:[phone]   Additional Information: PT need an appt in August for Medication HYDROcodone-acetaminophen (NORCO)  mg per tablet please advise

## 2025-07-30 DIAGNOSIS — G89.4 CHRONIC PAIN SYNDROME: ICD-10-CM

## 2025-07-30 DIAGNOSIS — F41.9 ANXIETY AND DEPRESSION: ICD-10-CM

## 2025-07-30 DIAGNOSIS — F32.A ANXIETY AND DEPRESSION: ICD-10-CM

## 2025-07-30 DIAGNOSIS — R51.9 NONINTRACTABLE HEADACHE, UNSPECIFIED CHRONICITY PATTERN, UNSPECIFIED HEADACHE TYPE: ICD-10-CM

## 2025-07-30 DIAGNOSIS — L40.8 PSORIASIS WITH PUSTULES: ICD-10-CM

## 2025-07-30 DIAGNOSIS — L40.50 PSORIATIC ARTHRITIS: ICD-10-CM

## 2025-07-30 DIAGNOSIS — J44.89 COPD (CHRONIC OBSTRUCTIVE PULMONARY DISEASE) WITH CHRONIC BRONCHITIS: ICD-10-CM

## 2025-08-01 ENCOUNTER — TELEPHONE (OUTPATIENT)
Dept: RHEUMATOLOGY | Facility: CLINIC | Age: 65
End: 2025-08-01
Payer: MEDICARE

## 2025-08-01 NOTE — TELEPHONE ENCOUNTER
Copied from CRM #[id number]. Topic: Medications - Medication Refill  >> Aug 1, 2025  2:58 PM Lizz wrote:  Type:  RX Refill Request    Who Called: Pt   Refill or New Rx: Refill   RX Name and Strength:   1. butalbital-acetaminophen-caffeine -40 mg (FIORICET, ESGIC) -40 mg per tablet   2. HYDROcodone-acetaminophen (NORCO)  mg per tablet  How is the patient currently taking it? (ex. 1XDay):call  Is this a 30 day or 90 day RX: 90  Preferred Pharmacy with phone number:   [facility] [address]  [address] [address]  [facility] [address]  Phone: [phone] Fax: [phone]  Local or Mail Order: Local   Ordering Provider: Dr. Rodriguez  Would the patient rather a call back or a response via [facility]?  Call   Best Call Back Number: [phone]  Additional Information:  pt states the pharmacy hasn't received the Rx for butalbital-acetaminophen-caffeine -40 mg (FIORICET, ESGIC) -40 mg per tablet    Additionally, pt states HYDROcodone-acetaminophen (NORCO)  mg per tablet is due to be filled this month as well... Please call to advise... Thank you...

## 2025-08-04 RX ORDER — BUTALBITAL, ACETAMINOPHEN AND CAFFEINE 50; 325; 40 MG/1; MG/1; MG/1
1 TABLET ORAL EVERY 6 HOURS PRN
Qty: 120 TABLET | Refills: 3 | Status: SHIPPED | OUTPATIENT
Start: 2025-08-04

## 2025-08-07 DIAGNOSIS — J44.89 COPD (CHRONIC OBSTRUCTIVE PULMONARY DISEASE) WITH CHRONIC BRONCHITIS: ICD-10-CM

## 2025-08-07 DIAGNOSIS — L40.8 PSORIASIS WITH PUSTULES: ICD-10-CM

## 2025-08-07 DIAGNOSIS — R05.3 CHRONIC COUGH: ICD-10-CM

## 2025-08-07 DIAGNOSIS — F32.A ANXIETY AND DEPRESSION: ICD-10-CM

## 2025-08-07 DIAGNOSIS — F41.9 ANXIETY AND DEPRESSION: ICD-10-CM

## 2025-08-07 DIAGNOSIS — R51.9 NONINTRACTABLE HEADACHE, UNSPECIFIED CHRONICITY PATTERN, UNSPECIFIED HEADACHE TYPE: ICD-10-CM

## 2025-08-07 DIAGNOSIS — L40.50 PSORIATIC ARTHRITIS: ICD-10-CM

## 2025-08-07 RX ORDER — CYPROHEPTADINE HYDROCHLORIDE 4 MG/1
4 TABLET ORAL NIGHTLY
Qty: 30 TABLET | Refills: 3 | Status: SHIPPED | OUTPATIENT
Start: 2025-08-07

## 2025-08-09 DIAGNOSIS — J18.9 PNEUMONIA DUE TO INFECTIOUS ORGANISM, UNSPECIFIED LATERALITY, UNSPECIFIED PART OF LUNG: ICD-10-CM

## 2025-08-09 DIAGNOSIS — J84.9 INTERSTITIAL PULMONARY DISEASE, UNSPECIFIED: ICD-10-CM

## 2025-08-11 RX ORDER — PROMETHAZINE HYDROCHLORIDE AND DEXTROMETHORPHAN HYDROBROMIDE 6.25; 15 MG/5ML; MG/5ML
SYRUP ORAL
Qty: 240 ML | Refills: 1 | Status: SHIPPED | OUTPATIENT
Start: 2025-08-11

## 2025-09-01 DIAGNOSIS — L40.50 PSORIATIC ARTHRITIS: ICD-10-CM

## 2025-09-01 DIAGNOSIS — G89.4 CHRONIC PAIN SYNDROME: ICD-10-CM

## 2025-09-01 DIAGNOSIS — M06.00 SERONEGATIVE RHEUMATOID ARTHRITIS: ICD-10-CM

## 2025-09-02 DIAGNOSIS — J44.89 COPD (CHRONIC OBSTRUCTIVE PULMONARY DISEASE) WITH CHRONIC BRONCHITIS: ICD-10-CM

## 2025-09-02 DIAGNOSIS — D53.9 MACROCYTIC ANEMIA: ICD-10-CM

## 2025-09-02 DIAGNOSIS — F32.A ANXIETY AND DEPRESSION: ICD-10-CM

## 2025-09-02 DIAGNOSIS — F41.9 ANXIETY AND DEPRESSION: ICD-10-CM

## 2025-09-02 DIAGNOSIS — R05.3 CHRONIC COUGH: ICD-10-CM

## 2025-09-02 DIAGNOSIS — M06.00 SERONEGATIVE RHEUMATOID ARTHRITIS: ICD-10-CM

## 2025-09-02 DIAGNOSIS — G89.4 CHRONIC PAIN SYNDROME: ICD-10-CM

## 2025-09-02 DIAGNOSIS — G47.00 INSOMNIA, UNSPECIFIED TYPE: ICD-10-CM

## 2025-09-02 DIAGNOSIS — I10 HYPERTENSION, UNSPECIFIED TYPE: ICD-10-CM

## 2025-09-02 DIAGNOSIS — L40.50 PSORIATIC ARTHRITIS: ICD-10-CM

## 2025-09-02 RX ORDER — LIDOCAINE AND PRILOCAINE 25; 25 MG/G; MG/G
CREAM TOPICAL
Qty: 30 G | Refills: 3 | Status: SHIPPED | OUTPATIENT
Start: 2025-09-02

## 2025-09-02 RX ORDER — PAROXETINE 40 MG/1
40 TABLET, FILM COATED ORAL DAILY
Qty: 90 TABLET | Refills: 1 | Status: SHIPPED | OUTPATIENT
Start: 2025-09-02

## 2025-09-02 RX ORDER — QUETIAPINE FUMARATE 50 MG/1
150 TABLET, FILM COATED ORAL NIGHTLY
Qty: 270 TABLET | Refills: 1 | Status: SHIPPED | OUTPATIENT
Start: 2025-09-02

## 2025-09-02 RX ORDER — METOPROLOL SUCCINATE 25 MG/1
TABLET, EXTENDED RELEASE ORAL
Qty: 90 TABLET | Refills: 0 | Status: SHIPPED | OUTPATIENT
Start: 2025-09-02